# Patient Record
Sex: MALE | Race: WHITE | NOT HISPANIC OR LATINO | Employment: FULL TIME | URBAN - METROPOLITAN AREA
[De-identification: names, ages, dates, MRNs, and addresses within clinical notes are randomized per-mention and may not be internally consistent; named-entity substitution may affect disease eponyms.]

---

## 2017-02-10 ENCOUNTER — GENERIC CONVERSION - ENCOUNTER (OUTPATIENT)
Dept: OTHER | Facility: OTHER | Age: 52
End: 2017-02-10

## 2017-03-13 ENCOUNTER — GENERIC CONVERSION - ENCOUNTER (OUTPATIENT)
Dept: OTHER | Facility: OTHER | Age: 52
End: 2017-03-13

## 2017-03-16 ENCOUNTER — ALLSCRIPTS OFFICE VISIT (OUTPATIENT)
Dept: OTHER | Facility: OTHER | Age: 52
End: 2017-03-16

## 2017-05-30 ENCOUNTER — GENERIC CONVERSION - ENCOUNTER (OUTPATIENT)
Dept: OTHER | Facility: OTHER | Age: 52
End: 2017-05-30

## 2017-05-31 ENCOUNTER — GENERIC CONVERSION - ENCOUNTER (OUTPATIENT)
Dept: OTHER | Facility: OTHER | Age: 52
End: 2017-05-31

## 2017-07-16 LAB
A/G RATIO (HISTORICAL): 1.9 (ref 1.2–2.2)
ALBUMIN SERPL BCP-MCNC: 4.9 G/DL (ref 3.5–5.5)
ALP SERPL-CCNC: 51 IU/L (ref 39–117)
ALT SERPL W P-5'-P-CCNC: 43 IU/L (ref 0–44)
AST SERPL W P-5'-P-CCNC: 24 IU/L (ref 0–40)
BILIRUB SERPL-MCNC: 0.9 MG/DL (ref 0–1.2)
BUN SERPL-MCNC: 16 MG/DL (ref 6–24)
BUN/CREA RATIO (HISTORICAL): 16 (ref 9–20)
CALCIUM SERPL-MCNC: 9.8 MG/DL (ref 8.7–10.2)
CHLORIDE SERPL-SCNC: 97 MMOL/L (ref 96–106)
CHOLEST SERPL-MCNC: 157 MG/DL (ref 100–199)
CO2 SERPL-SCNC: 24 MMOL/L (ref 18–29)
CREAT SERPL-MCNC: 1 MG/DL (ref 0.76–1.27)
CREATININE, URINE (HISTORICAL): 92.6 MG/DL
EGFR AFRICAN AMERICAN (HISTORICAL): 100 ML/MIN/1.73
EGFR-AMERICAN CALC (HISTORICAL): 86 ML/MIN/1.73
GLUCOSE SERPL-MCNC: 114 MG/DL (ref 65–99)
HBA1C MFR BLD HPLC: 6.6 % (ref 4.8–5.6)
HDLC SERPL-MCNC: 34 MG/DL
LDLC SERPL CALC-MCNC: 96 MG/DL (ref 0–99)
MICROALBUM.,U,RANDOM (HISTORICAL): 3.5 UG/ML
MICROALBUMIN/CREATININE RATIO (HISTORICAL): 3.8 MG/G CREAT (ref 0–30)
POTASSIUM SERPL-SCNC: 4.4 MMOL/L (ref 3.5–5.2)
PROSTATE SPECIFIC ANTIGEN (HISTORICAL): 0.3 NG/ML (ref 0–4)
SODIUM SERPL-SCNC: 138 MMOL/L (ref 134–144)
TOT. GLOBULIN, SERUM (HISTORICAL): 2.6 G/DL (ref 1.5–4.5)
TOTAL PROTEIN (HISTORICAL): 7.5 G/DL (ref 6–8.5)
TRIGL SERPL-MCNC: 136 MG/DL (ref 0–149)

## 2017-07-17 ENCOUNTER — GENERIC CONVERSION - ENCOUNTER (OUTPATIENT)
Dept: OTHER | Facility: OTHER | Age: 52
End: 2017-07-17

## 2017-11-27 ENCOUNTER — GENERIC CONVERSION - ENCOUNTER (OUTPATIENT)
Dept: OTHER | Facility: OTHER | Age: 52
End: 2017-11-27

## 2017-12-03 LAB
BUN SERPL-MCNC: 19 MG/DL (ref 6–24)
BUN/CREA RATIO (HISTORICAL): 19 (ref 9–20)
CALCIUM SERPL-MCNC: 10.1 MG/DL (ref 8.7–10.2)
CHLORIDE SERPL-SCNC: 96 MMOL/L (ref 96–106)
CO2 SERPL-SCNC: 27 MMOL/L (ref 18–29)
CREAT SERPL-MCNC: 0.99 MG/DL (ref 0.76–1.27)
EGFR AFRICAN AMERICAN (HISTORICAL): 100 ML/MIN/1.73
EGFR-AMERICAN CALC (HISTORICAL): 87 ML/MIN/1.73
GLUCOSE SERPL-MCNC: 126 MG/DL (ref 65–99)
HBA1C MFR BLD HPLC: 6.9 % (ref 4.8–5.6)
POTASSIUM SERPL-SCNC: 4.8 MMOL/L (ref 3.5–5.2)
SODIUM SERPL-SCNC: 139 MMOL/L (ref 134–144)

## 2017-12-04 ENCOUNTER — GENERIC CONVERSION - ENCOUNTER (OUTPATIENT)
Dept: OTHER | Facility: OTHER | Age: 52
End: 2017-12-04

## 2017-12-11 ENCOUNTER — ALLSCRIPTS OFFICE VISIT (OUTPATIENT)
Dept: OTHER | Facility: OTHER | Age: 52
End: 2017-12-11

## 2018-01-09 NOTE — RESULT NOTES
Verified Results  [facility]) BMP8+eGFR [id number] 08:14AM [facility]     Test Name Result Flag Reference   Glucose, Serum 149 mg/dL H 65-99   BUN 19 mg/dL  6-24   Creatinine, Serum 1 03 mg/dL  0 76-1 27   eGFR If NonAfricn Am 83 mL/min/1 73  >59   eGFR If Africn Am 96 mL/min/1 73  >59   BUN/Creatinine Ratio 18  9-20   Sodium, Serum 141 mmol/L  136-144   Potassium, Serum 4 7 mmol/L  3 5-5 2   Chloride, Serum 97 mmol/L     Carbon Dioxide, Total 26 mmol/L  18-29   Calcium, Serum 10 1 mg/dL  8 7-10 2     (1) HEMOGLOBIN A1C [id number] 08:14AM [place]     Test Name Result Flag Reference   Hemoglobin A1c 8 0 % H 4 8-5 6   Pre-diabetes: 5 7 - 6 4           Diabetes: >6 4           Glycemic control for adults with diabetes: <7 0

## 2018-01-11 NOTE — RESULT NOTES
Verified Results  (1) COMPREHENSIVE METABOLIC PANEL [id number] 69:30CX [address] [facility]     Test Name Result Flag Reference   Glucose, Serum 114 mg/dL H 65-99   BUN 16 mg/dL  6-24   Creatinine, Serum 1 00 mg/dL  0 76-1 27   BUN/Creatinine Ratio 16  9-20   Sodium, Serum 138 mmol/L  134-144   Potassium, Serum 4 4 mmol/L  3 5-5 2   Chloride, Serum 97 mmol/L     Carbon Dioxide, Total 24 mmol/L  18-29   Calcium, Serum 9 8 mg/dL  8 7-10 2   Protein, Total, Serum 7 5 g/dL  6 0-8 5   Albumin, Serum 4 9 g/dL  3 5-5 5   Globulin, Total 2 6 g/dL  1 5-4 5   A/G Ratio 1 9  1 2-2 2   Bilirubin, Total 0 9 mg/dL  0 0-1 2   Alkaline Phosphatase, S 51 IU/L     AST (SGOT) 24 IU/L  0-40   ALT (SGPT) 43 IU/L  0-44   eGFR If NonAfricn Am 86 mL/min/1 73  >59   eGFR If Africn Am 100 mL/min/1 73  >59     (1) HEMOGLOBIN A1C [id number] 07:30AM [address] [facility]     Test Name Result Flag Reference   Hemoglobin A1c 6 6 % H 4 8-5 6   Pre-diabetes: 5 7 - 6 4           Diabetes: >6 4           Glycemic control for adults with diabetes: <7 0     (1) LIPID PANEL FASTING W DIRECT LDL REFLEX [id number] 07:30AM [address] [facility]     Test Name Result Flag Reference   Cholesterol, Total 157 mg/dL  100-199   Triglycerides 136 mg/dL  0-149   HDL Cholesterol 34 mg/dL L >39   LDL Cholesterol Calc 96 mg/dL  0-99     (1) MICROALBUMIN CREATININE RATIO, RANDOM URINE [id number] 07:30AM [address] [facility]     Test Name Result Flag Reference   Creatinine, Urine 92 6 mg/dL  Not Estab  Microalbumin, Urine 3 5 ug/mL  Not Estab  Microalb/Creat Ratio 3 8 mg/g creat  0 0-30 0     (1) PSA (SCREEN) (Dx V76 44 Screen for Prostate Cancer) [id number] 07:30AM [address] [facility]     Test Name Result Flag Reference   Prostate Specific Ag, Serum 0 3 ng/mL  0 0-4 0   Roche ECLIA methodology  According to the American Urological Association, Serum PSA should  decrease and remain at undetectable levels after radical  prostatectomy   The AUA defines biochemical recurrence as an initial  PSA value 0 2 ng/mL or greater followed by a subsequent confirmatory  PSA value 0 2 ng/mL or greater  Values obtained with different assay methods or kits cannot be used  interchangeably  Results cannot be interpreted as absolute evidence  of the presence or absence of malignant disease

## 2018-01-12 NOTE — RESULT NOTES
Verified Results  (1) COMPREHENSIVE METABOLIC PANEL [id number] 17:83OI [employer]     Test Name Result Flag Reference   Glucose, Serum 124 mg/dL H 65-99   BUN 19 mg/dL  6-24   Creatinine, Serum 0 93 mg/dL  0 76-1 27   eGFR If NonAfricn Am 95 mL/min/1 73  >59   eGFR If Africn Am 109 mL/min/1 73  >59   BUN/Creatinine Ratio 20  9-20   Sodium, Serum 138 mmol/L  134-144   Potassium, Serum 4 5 mmol/L  3 5-5 2   Chloride, Serum 96 mmol/L L    Carbon Dioxide, Total 24 mmol/L  18-29   Calcium, Serum 9 7 mg/dL  8 7-10 2   Protein, Total, Serum 7 6 g/dL  6 0-8 5   Albumin, Serum 4 7 g/dL  3 5-5 5   Globulin, Total 2 9 g/dL  1 5-4 5   A/G Ratio 1 6  1 1-2 5   Bilirubin, Total 0 7 mg/dL  0 0-1 2   Alkaline Phosphatase, S 54 IU/L     AST (SGOT) 32 IU/L  0-40   ALT (SGPT) 61 IU/L H 0-44     (1) LIPID PANEL, FASTING [id number] 08:18AM [employer]     Test Name Result Flag Reference   Cholesterol, Total 166 mg/dL  100-199   Triglycerides 159 mg/dL H 0-149   HDL Cholesterol 34 mg/dL L >39   According to ATP-III Guidelines, HDL-C >59 mg/dL is considered a  negative risk factor for CHD  VLDL Cholesterol Greg 32 mg/dL  5-40   LDL Cholesterol Calc 100 mg/dL H 0-99   T  Chol/HDL Ratio 4 9 ratio units  0 0-5 0   T  Chol/HDL Ratio                                                             Men  Women                                               1/2 Avg  Risk  3 4    3 3                                                   Avg Risk  5 0    4 4                                                2X Avg  Risk  9 6    7 1                                                3X Avg  Risk 23 4   11 0     (1) PSA (SCREEN) (Dx V76 44 Screen for Prostate Cancer) [id number] 08:18AM [employer]     Test Name Result Flag Reference   Prostate Specific Ag, Serum 0 4 ng/mL  0 0-4 0   Roche ECLIA methodology  According to the American Urological Association, Serum PSA should  decrease and remain at undetectable levels after radical  prostatectomy   The A defines biochemical recurrence as an initial  PSA value 0 2 ng/mL or greater followed by a subsequent confirmatory  PSA value 0 2 ng/mL or greater  Values obtained with different assay methods or kits cannot be used  interchangeably  Results cannot be interpreted as absolute evidence  of the presence or absence of malignant disease  (1) MICROALBUMIN CREATININE RATIO, RANDOM URINE [id number] 08:18AM C[facility]     Test Name Result Flag Reference   Creatinine, Urine 21 0 mg/dL  Not Estab  Microalbumin, Urine <3 0 ug/mL  Not Estab     Microalb/Creat Ratio <14 3 mg/g creat  0 0-30 0

## 2018-01-12 NOTE — RESULT NOTES
Message   Recorded as Task   Date: 11/18/2016 01:59 PM, Created By: Ovi Ribeiro   Task Name: Call Patient with results   Assigned To: Pasquale Roy   Regarding Patient: Jose Shabazz, Status: Active   CommentAiddejuan Taylor - 18 Nov 2016 1:59 PM     Patient Phone: [phone]    sugar and A1C is much higher- needs appt tp review- 1-2 weeks is OK- this is diabetic range and he should be on meds- last A1C had been < 7%   Brooke Bruce - 22 Nov 2016 10:38 AM     TASK EDITED  PT aware- OV scheduled          Signatures   Electronically signed by : Deonna Perez, ; Nov 22 2016 10:39AM EST                       (Author)

## 2018-01-13 VITALS
HEART RATE: 96 BPM | DIASTOLIC BLOOD PRESSURE: 90 MMHG | OXYGEN SATURATION: 99 % | BODY MASS INDEX: 43.47 KG/M2 | RESPIRATION RATE: 16 BRPM | HEIGHT: 67 IN | WEIGHT: 277 LBS | SYSTOLIC BLOOD PRESSURE: 140 MMHG

## 2018-01-14 NOTE — RESULT NOTES
Verified Results  (1) COMPREHENSIVE METABOLIC PANEL [id number] 82:93GA [facility]     Test Name Result Flag Reference   Glucose, Serum 128 mg/dL H 65-99   BUN 18 mg/dL  6-24   Creatinine, Serum 1 02 mg/dL  0 76-1 27   eGFR If NonAfricn Am 85 mL/min/1 73  >59   eGFR If Africn Am 98 mL/min/1 73  >59   BUN/Creatinine Ratio 18  9-20   Sodium, Serum 139 mmol/L  134-144   Potassium, Serum 4 6 mmol/L  3 5-5 2   Chloride, Serum 96 mmol/L L    Carbon Dioxide, Total 26 mmol/L  18-29   Calcium, Serum 10 1 mg/dL  8 7-10 2   Protein, Total, Serum 7 7 g/dL  6 0-8 5   Albumin, Serum 4 9 g/dL  3 5-5 5   Globulin, Total 2 8 g/dL  1 5-4 5   A/G Ratio 1 8  1 1-2 5   Bilirubin, Total 0 8 mg/dL  0 0-1 2   Alkaline Phosphatase, S 53 IU/L     AST (SGOT) 28 IU/L  0-40   ALT (SGPT) 49 IU/L H 0-44     (LC) Lipid Panel [id number] 08:31AM [facility]     Test Name Result Flag Reference   Cholesterol, Total 217 mg/dL H 100-199   Triglycerides 233 mg/dL H 0-149   HDL Cholesterol 30 mg/dL L >39   According to ATP-III Guidelines, HDL-C >59 mg/dL is considered a  negative risk factor for CHD     VLDL Cholesterol Greg 47 mg/dL H 5-40   LDL Cholesterol Calc 140 mg/dL H 0-99

## 2018-01-15 NOTE — RESULT NOTES
Verified Results  ([address] [facility]) Lyme Ab/Western Blot Reflex [id number] 11:08AM [facility]     Test Name Result Flag Reference   Lyme IgG/IgM Ab <0 91 ISR  0 00-0 90   Negative         <0 91                                                 Equivocal  0 91 - 1 09                                                 Positive         >1 09   Lyme Disease Ab, Quant, IgM <0 80 index  0 00-0 79   Negative         <0 80                                                 Equivocal  0 80 - 1 19                                                 Positive         >1 19                  IgM levels may peak at 3-6 weeks post infection, then                  gradually decline

## 2018-01-15 NOTE — RESULT NOTES
Verified Results  ([address] [facility]) Hemoglobin A1c [id number] 08:31AM [facility] Weiss     Test Name Result Flag Reference   Hemoglobin A1c 6 7 %Hb H    Reference Range:  American Diabetes Association (ADA) Guidelines:  <5 7: Decreased risk for diabetes  5 7 - 6 4: Increased risk for diabetes  >6 4: Ongoing Hyperglycemia of any cause  <7 0: Glycemic control for adults with diabetes   Estimated Average Glucose 146 mg/dL

## 2018-01-16 NOTE — RESULT NOTES
Verified Results  (1) BASIC METABOLIC PROFILE [id number] 07:59AM [email or web address]     Test Name Result Flag Reference   Glucose, Serum 143 mg/dL H 65-99   BUN 19 mg/dL  6-24   Creatinine, Serum 0 98 mg/dL  0 76-1 27   eGFR If NonAfricn Am 88 mL/min/1 73  >59   eGFR If Africn Am 102 mL/min/1 73  >59   BUN/Creatinine Ratio 19  9-20   Sodium, Serum 140 mmol/L  134-144   Potassium, Serum 4 9 mmol/L  3 5-5 2   Chloride, Serum 97 mmol/L     Carbon Dioxide, Total 25 mmol/L  18-29   Calcium, Serum 9 8 mg/dL  8 7-10 2     (1) HEMOGLOBIN A1C [id number] 07:59AM [email or web address]     Test Name Result Flag Reference   Hemoglobin A1c 8 0 % H 4 8-5 6   Pre-diabetes: 5 7 - 6 4           Diabetes: >6 4           Glycemic control for adults with diabetes: <7 0

## 2018-01-23 VITALS
OXYGEN SATURATION: 98 % | RESPIRATION RATE: 16 BRPM | HEART RATE: 100 BPM | DIASTOLIC BLOOD PRESSURE: 90 MMHG | HEIGHT: 67 IN | WEIGHT: 266 LBS | SYSTOLIC BLOOD PRESSURE: 140 MMHG | BODY MASS INDEX: 41.75 KG/M2

## 2018-01-23 NOTE — RESULT NOTES
Verified Results  (1) HEMOGLOBIN A1C [id number] 07:31AM [employer]     Test Name Result Flag Reference   Hemoglobin A1c 6 9 % H 4 8-5 6   Pre-diabetes: 5 7 - 6 4           Diabetes: >6 4           Glycemic control for adults with diabetes: <7 0     (1) BASIC METABOLIC PROFILE [id number] 07:31AM [employer]     Test Name Result Flag Reference   Glucose, Serum 126 mg/dL H 65-99   BUN 19 mg/dL  6-24   Creatinine, Serum 0 99 mg/dL  0 76-1 27   BUN/Creatinine Ratio 19  9-20   Sodium, Serum 139 mmol/L  134-144   Potassium, Serum 4 8 mmol/L  3 5-5 2   Chloride, Serum 96 mmol/L     Carbon Dioxide, Total 27 mmol/L  18-29   Calcium, Serum 10 1 mg/dL  8 7-10 2   eGFR If NonAfricn Am 87 mL/min/1 73  >59   eGFR If Africn Am 100 mL/min/1 73  >59

## 2018-01-23 NOTE — PROGRESS NOTES
Assessment    1  Encounter for preventive health examination (V70 0) (Z00 00)   2  Encounter for screening colonoscopy (V76 51) (Z12 11)   3  Need for vaccination (V05 9) (Z23)   4  Encounter for HCV screening test for low risk patient (V73 89) (Z11 59)    Plan  Diabetes mellitus    · MetFORMIN HCl - 850 MG Oral Tablet; Take 1 tablet twice daily   · *VB - Foot Exam ; every 1 year; Last 13GZK8823; Next 74PLY0813; Status:Active  Encounter for HCV screening test for low risk patient, Hyperlipidemia associated with  type 2 diabetes mellitus, Hypertension, Screening for prostate cancer    · (1) COMPREHENSIVE METABOLIC PANEL; Status:Active; Requested for:88Exr4286;    · (1) HEMOGLOBIN A1C; Status:Active; Requested for:97Rmi1571;    · (1) HEP C ANTIBODY; Status:Active; Requested for:36Tsf6942;    · (1) LIPID PANEL FASTING W DIRECT LDL REFLEX; Status:Active; Requested  for:94Pzp6857;    · (1) MICROALBUMIN CREATININE RATIO, RANDOM URINE; Status:Active; Requested  for:38Riy1034;    · (1) PSA (SCREEN) (Dx V76 44 Screen for Prostate Cancer); Status:Active; Requested  for:18Quc7458;   Encounter for screening colonoscopy    · COLONOSCOPY; Status:Active; Requested for:18Ikm1499;   Hyperlipidemia associated with type 2 diabetes mellitus    · Atorvastatin Calcium 10 MG Oral Tablet; TAKE 1 TABLET DAILY AS DIRECTED  Need for vaccination    · Fluzone Quadrivalent 0 5 ML Intramuscular Suspension Prefilled Syringe  Peptic reflux esophagitis    · *VB - Foot Exam; Status:Complete;   Done: 56LLB2119 12:00AM    Discussion/Summary  health maintenance visit eats an adequate diet Currently, he has an adequate exercise regimen  Prostate cancer screening: prostate cancer screening is current  Testicular cancer screening: testicular cancer screening is not indicated  Colorectal cancer screening: colonoscopy has been ordered  Screening lab work includes completed  The immunizations are up to date   He was advised to be evaluated by an optometrist and a dentist  Advice and education were given regarding nutrition, seat belt use, weight loss and advanced directive planning  Patient discussion: discussed with the patient  The patient was counseled on Hepatitis C screening  The patient agrees to Hepatitis C screening  5 Wishes forms provided  Hep C screen with next labs PSA 7/2018  Colonoscopy order  The treatment plan was reviewed with the patient/guardian  The patient/guardian understands and agrees with the treatment plan      Chief Complaint  annual     Advance Directives  Advance Directive [facility]:   NO - Patient does not have an advance health care directive  History of Present Illness  , Adult Male: The patient is being seen for a health maintenance evaluation  Social History: He is   Work status: working full time  The patient is a former cigarette smoker and quit smoking 11/13/1994  He reports rare alcohol use  General Health: The patient's health since the last visit is described as good  He has regular dental visits  He denies vision problems  He denies hearing loss  Lifestyle:  He does not have a healthy diet  He has weight concerns  He does not exercise regularly  He does not use tobacco  The patient is a former cigarette smoker and quit smoking cigarettes: 11/13/1994  He consumes alcohol  He reports occasional alcohol use  Alcohol concern: no personal concern about alcohol use  He denies drug use  Screening: Active Problems    1  Diabetes mellitus (250 00) (E11 9)   2  Encounter for screening colonoscopy (V76 51) (Z12 11)   3  Hyperlipidemia associated with type 2 diabetes mellitus (209 89,782  4) (E11 69,E78 5)   4  Hypertension (401 9) (I10)   5  Peptic reflux esophagitis (530 11) (K21 0)   6   Screening for prostate cancer (V76 44) (Z12 5)    Past Medical History    · History of Encounter for screening fecal occult blood testing (V76 51) (Z12 11)   · History of Need for Tdap vaccination (V06 1) (Z23)   · History of Tick bite of back (911 4,E906 4) (T41 983A,R33  Shani Raymond)    Family History  Father    · Family history of diabetes mellitus (V18 0) (Z83 3)   · Family history of hyperlipidemia (V18 19) (Z83 49)  Sister    · Family history of Bipolar 1 disorder   · Family history of suicide (V17 0) (Z81 8)  Family History    · Denied: Family history of substance abuse    Social History    · Alcohol use (V49 89) (Z78 9)   · Former smoker (V15 82) (G20 570)   · Full-time employment   · Lives alone without help available (V60 4) (Z60 2)    Current Meds   1  Atorvastatin Calcium 10 MG Oral Tablet; TAKE 1 TABLET DAILY AS DIRECTED; Therapy: 14ODD7776 to (Juan David Ayon)  Requested for: 10Yyr5429; Last   Rx:84Utz5195 Ordered   2  HydroCHLOROthiazide 25 MG Oral Tablet; take 1 tablet by mouth daily; Therapy: 68TQG8297 to (Evaluate:18Feb2018)  Requested for: 80ZWP0621; Last   Rx:20Nov2017 Ordered   3  MetFORMIN HCl - 850 MG Oral Tablet; Take 1 tablet twice daily; Therapy: 94TWU2534 to (Yuridia Anis)  Requested for: 18DNG8467; Last   Rx:87Bgj9839 Ordered   4  Omeprazole 20 MG Oral Capsule Delayed Release; take 1 capsule daily; Therapy: 12IZF4480 to ([id number])  Requested for: 58MME3214; Last   Rx:07Nov2017 Ordered   5  Osteo Bi-Flex Adv Triple St Oral Tablet; Take 1 tablet twice daily; Therapy: 34EOB0408 to (Last Rx:14Mar2016) Ordered   6  Quinapril HCl - 40 MG Oral Tablet; take 1 tablet by mouth daily; Therapy: 56JLF9238 to (Evaluate:56Ryq6204)  Requested for: 39MHK4172; Last   Rx:27Nov2017 Ordered    Allergies    1  No Known Drug Allergies    Vitals   Recorded: 89FDI0482 06:17PM   Heart Rate 100   Respiration 16   Systolic 904   Diastolic 90   Height 5 ft 7 in   Weight 266 lb    BMI Calculated 41 66   BSA Calculated 2 28   O2 Saturation 98     Physical Exam    Constitutional   General appearance: No acute distress, well appearing and well nourished      Head and Face   Head and face: Normal     Palpation of the face and sinuses: No sinus tenderness  Eyes   Pupils and irises: Equal, round, reactive to light  Ears, Nose, Mouth, and Throat   External inspection of ears and nose: Normal     Nasal mucosa, septum, and turbinates: Normal without edema or erythema  Oropharynx: Normal with no erythema, edema, exudate or lesions  Neck   Neck: Supple, symmetric, trachea midline, no masses  Thyroid: Normal, no thyromegaly  Pulmonary   Respiratory effort: No increased work of breathing or signs of respiratory distress  Auscultation of lungs: Clear to auscultation  Cardiovascular   Auscultation of heart: Normal rate and rhythm, normal S1 and S2, no murmurs  Carotid pulses: 2+ bilaterally  Pedal pulses: 2+ bilaterally  Abdomen   Abdomen: Non-tender, no masses  Liver and spleen: No hepatomegaly or splenomegaly  Psychiatric   Orientation to person, place and time: Normal        Results/Data  PHQ-2 Adult Depression Screening 82Htw9396 07:05PM [facility]     Test Name Result Flag Reference   PHQ-2 Adult Depression Score 0     Over the last two weeks, how often have you been bothered by any of the following problems? Little interest or pleasure in doing things: Not at all - 0  Feeling down, depressed, or hopeless: Not at all - 0   PHQ-2 Adult Depression Screening Negative       *VB - Foot Exam 54WCK7198 12:00AM [facility]     Test Name Result Flag Reference   FOOT EXAM 51PZR7677         Health Management  Diabetes mellitus   *VB - Eye Exam; every 1 year; Last 87RFP2306; Next Due: 86GKI5800; Active  *VB - Foot Exam; every 1 year; Last 58EBS9622; Next Due: 09Viw0787; Active  Encounter for screening colonoscopy   COLONOSCOPY; every 10 years; Next Due: 71CVY2724; Overdue  History of Encounter for screening fecal occult blood testing   (1) OCCULT BLOOD, FECAL IMMUNOCHEMICAL TEST; every 1 year; Last 51QLB2914;  Next Due: 72HHT0543;  Overdue    Signatures   Electronically signed by : Cassandra Nicole MD; Dec 11 2017  7:08PM EST                       (Author)

## 2018-02-03 DIAGNOSIS — K21.00 PEPTIC REFLUX ESOPHAGITIS: Primary | ICD-10-CM

## 2018-02-05 RX ORDER — OMEPRAZOLE 20 MG/1
CAPSULE, DELAYED RELEASE ORAL
Qty: 90 CAPSULE | Refills: 0 | Status: SHIPPED | OUTPATIENT
Start: 2018-02-05 | End: 2018-05-05 | Stop reason: SDUPTHER

## 2018-02-20 DIAGNOSIS — I10 ESSENTIAL HYPERTENSION: Primary | ICD-10-CM

## 2018-02-20 RX ORDER — HYDROCHLOROTHIAZIDE 25 MG/1
TABLET ORAL
Qty: 90 TABLET | Refills: 1 | Status: SHIPPED | OUTPATIENT
Start: 2018-02-20 | End: 2018-08-21 | Stop reason: SDUPTHER

## 2018-02-26 DIAGNOSIS — I10 HYPERTENSION, UNSPECIFIED TYPE: Primary | ICD-10-CM

## 2018-02-28 RX ORDER — QUINAPRIL 40 MG/1
TABLET ORAL
Qty: 90 TABLET | Refills: 1 | Status: SHIPPED | OUTPATIENT
Start: 2018-02-28 | End: 2018-08-21 | Stop reason: SDUPTHER

## 2018-05-05 DIAGNOSIS — K21.00 PEPTIC REFLUX ESOPHAGITIS: ICD-10-CM

## 2018-05-05 RX ORDER — OMEPRAZOLE 20 MG/1
CAPSULE, DELAYED RELEASE ORAL
Qty: 90 CAPSULE | Refills: 0 | Status: SHIPPED | OUTPATIENT
Start: 2018-05-05 | End: 2018-08-22 | Stop reason: SDUPTHER

## 2018-05-16 DIAGNOSIS — E78.5 HYPERLIPIDEMIA ASSOCIATED WITH TYPE 2 DIABETES MELLITUS (HCC): Primary | ICD-10-CM

## 2018-05-16 DIAGNOSIS — E11.69 HYPERLIPIDEMIA ASSOCIATED WITH TYPE 2 DIABETES MELLITUS (HCC): Primary | ICD-10-CM

## 2018-05-16 RX ORDER — ATORVASTATIN CALCIUM 10 MG/1
TABLET, FILM COATED ORAL
Qty: 90 TABLET | Refills: 0 | Status: SHIPPED | OUTPATIENT
Start: 2018-05-16 | End: 2018-07-22 | Stop reason: SDUPTHER

## 2018-05-26 DIAGNOSIS — E11.9 TYPE 2 DIABETES MELLITUS WITHOUT COMPLICATION, WITHOUT LONG-TERM CURRENT USE OF INSULIN (HCC): Primary | ICD-10-CM

## 2018-06-07 DIAGNOSIS — E11.9 TYPE 2 DIABETES MELLITUS WITHOUT COMPLICATION, UNSPECIFIED WHETHER LONG TERM INSULIN USE (HCC): ICD-10-CM

## 2018-06-07 DIAGNOSIS — E78.5 HYPERLIPIDEMIA ASSOCIATED WITH TYPE 2 DIABETES MELLITUS (HCC): Primary | ICD-10-CM

## 2018-06-07 DIAGNOSIS — E11.69 HYPERLIPIDEMIA ASSOCIATED WITH TYPE 2 DIABETES MELLITUS (HCC): Primary | ICD-10-CM

## 2018-06-19 LAB
ALBUMIN SERPL-MCNC: 4.8 G/DL (ref 3.5–5.5)
ALBUMIN/CREAT UR: 8.4 MG/G CREAT (ref 0–30)
ALBUMIN/GLOB SERPL: 1.8 {RATIO} (ref 1.2–2.2)
ALP SERPL-CCNC: 52 IU/L (ref 39–117)
ALT SERPL-CCNC: 58 IU/L (ref 0–44)
AST SERPL-CCNC: 29 IU/L (ref 0–40)
BILIRUB SERPL-MCNC: 0.6 MG/DL (ref 0–1.2)
BUN SERPL-MCNC: 21 MG/DL (ref 6–24)
BUN/CREAT SERPL: 19 (ref 9–20)
CALCIUM SERPL-MCNC: 9.9 MG/DL (ref 8.7–10.2)
CHLORIDE SERPL-SCNC: 97 MMOL/L (ref 96–106)
CHOLEST SERPL-MCNC: 149 MG/DL (ref 100–199)
CO2 SERPL-SCNC: 24 MMOL/L (ref 20–29)
CREAT SERPL-MCNC: 1.13 MG/DL (ref 0.76–1.27)
CREAT UR-MCNC: 125.5 MG/DL
GLOBULIN SER-MCNC: 2.6 G/DL (ref 1.5–4.5)
GLUCOSE SERPL-MCNC: 144 MG/DL (ref 65–99)
HBA1C MFR BLD: 7.3 % (ref 4.8–5.6)
HCV AB S/CO SERPL IA: <0.1 S/CO RATIO (ref 0–0.9)
HDLC SERPL-MCNC: 33 MG/DL
LDLC SERPL CALC-MCNC: 86 MG/DL (ref 0–99)
MICROALBUMIN UR-MCNC: 10.5 UG/ML
POTASSIUM SERPL-SCNC: 4.7 MMOL/L (ref 3.5–5.2)
PROT SERPL-MCNC: 7.4 G/DL (ref 6–8.5)
PSA SERPL-MCNC: 0.3 NG/ML (ref 0–4)
SL AMB EGFR AFRICAN AMERICAN: 85 ML/MIN/1.73
SL AMB EGFR NON AFRICAN AMERICAN: 74 ML/MIN/1.73
SODIUM SERPL-SCNC: 139 MMOL/L (ref 134–144)
TRIGL SERPL-MCNC: 152 MG/DL (ref 0–149)

## 2018-06-21 ENCOUNTER — TELEPHONE (OUTPATIENT)
Dept: FAMILY MEDICINE CLINIC | Facility: CLINIC | Age: 53
End: 2018-06-21

## 2018-06-21 NOTE — TELEPHONE ENCOUNTER
Patient notified as per Dr Hermes Lr      ----- Message from Lelia Del Cid MD sent at 6/21/2018  1:14 PM EDT -----  Results reviewed-will discuss at scheduled appointment-no changes needed now    APPOINTMENT 7/2/18 @ 7040 ([phone])

## 2018-06-26 DIAGNOSIS — E11.9 TYPE 2 DIABETES MELLITUS WITHOUT COMPLICATION, WITHOUT LONG-TERM CURRENT USE OF INSULIN (HCC): ICD-10-CM

## 2018-07-02 ENCOUNTER — OFFICE VISIT (OUTPATIENT)
Dept: FAMILY MEDICINE CLINIC | Facility: CLINIC | Age: 53
End: 2018-07-02
Payer: COMMERCIAL

## 2018-07-02 VITALS
RESPIRATION RATE: 16 BRPM | BODY MASS INDEX: 43.32 KG/M2 | DIASTOLIC BLOOD PRESSURE: 80 MMHG | HEIGHT: 67 IN | OXYGEN SATURATION: 98 % | WEIGHT: 276 LBS | SYSTOLIC BLOOD PRESSURE: 140 MMHG | HEART RATE: 74 BPM

## 2018-07-02 DIAGNOSIS — E78.5 HYPERLIPIDEMIA ASSOCIATED WITH TYPE 2 DIABETES MELLITUS (HCC): Primary | ICD-10-CM

## 2018-07-02 DIAGNOSIS — E66.01 MORBID OBESITY (HCC): ICD-10-CM

## 2018-07-02 DIAGNOSIS — K21.00 PEPTIC REFLUX ESOPHAGITIS: ICD-10-CM

## 2018-07-02 DIAGNOSIS — I10 BENIGN ESSENTIAL HYPERTENSION: ICD-10-CM

## 2018-07-02 DIAGNOSIS — Z23 NEED FOR 23-POLYVALENT PNEUMOCOCCAL POLYSACCHARIDE VACCINE: ICD-10-CM

## 2018-07-02 DIAGNOSIS — E11.69 HYPERLIPIDEMIA ASSOCIATED WITH TYPE 2 DIABETES MELLITUS (HCC): Primary | ICD-10-CM

## 2018-07-02 DIAGNOSIS — E11.9 TYPE 2 DIABETES MELLITUS WITHOUT COMPLICATION, WITHOUT LONG-TERM CURRENT USE OF INSULIN (HCC): ICD-10-CM

## 2018-07-02 PROCEDURE — 90732 PPSV23 VACC 2 YRS+ SUBQ/IM: CPT

## 2018-07-02 PROCEDURE — 1036F TOBACCO NON-USER: CPT | Performed by: FAMILY MEDICINE

## 2018-07-02 PROCEDURE — 90471 IMMUNIZATION ADMIN: CPT

## 2018-07-02 PROCEDURE — 3008F BODY MASS INDEX DOCD: CPT | Performed by: FAMILY MEDICINE

## 2018-07-02 PROCEDURE — 99214 OFFICE O/P EST MOD 30 MIN: CPT | Performed by: FAMILY MEDICINE

## 2018-07-02 RX ORDER — GLUCOSAM/CHON-MSM1/C/MANG/BOSW 750-644 MG
1 TABLET ORAL DAILY
COMMUNITY
Start: 2016-03-14

## 2018-07-02 NOTE — PATIENT INSTRUCTIONS
Continue current medications  Call when prescriptions are needed  Repeat labs 6 months  Improve her diet, increase her exercise, and decreased weight  You're still do colonoscopy  [facility] [phone]  Pneumovax today  Consider getting the shingles vaccine at the pharmacy, but check with her insurance to be sure coverage before they administer it  Labs will be due in 6 month-December 2018-BMP and A1c  Discussed trial of pepcid OTC 40mg in AM, then attempt to taper

## 2018-07-02 NOTE — PROGRESS NOTES
[address]         NAME: Kailyn Starr is a 47 y o  male  : 1965    MRN: [id number]  DATE: 2018  TIME: 7:03 PM    Assessment and Plan   Hyperlipidemia associated with type 2 diabetes mellitus ([facility] ) [E11 69, E78 5]  1  Hyperlipidemia associated with type 2 diabetes mellitus ([facility] )     2  Benign essential hypertension     3  Peptic reflux esophagitis     4  Type 2 diabetes mellitus without complication, without long-term current use of insulin ([facility] )     5  Morbid obesity ([facility] )     6  Need for 23-polyvalent pneumococcal polysaccharide vaccine  PNEUMOCOCCAL POLYSACCHARIDE VACCINE 23-VALENT =>3YO SQ IM         Patient Instructions     Patient Instructions   Continue current medications  Call when prescriptions are needed  Repeat labs 6 months  Improve her diet, increase her exercise, and decreased weight  You're still do colonoscopy  [facility] -[phone]  Pneumovax today  Consider getting the shingles vaccine at the pharmacy, but check with her insurance to be sure coverage before they administer it  Labs will be due in 6 month-2018-BMP and A1c  Discussed trial of pepcid OTC 40mg in AM, then attempt to taper  Chief Complaint     Chief Complaint   Patient presents with    Follow-up     mm - revew labs, med refill    History of Present Illness       Medical Management-  1) Diabetes- A1C is at 7 3%- compliant with meds  Aware of changes to be made  2) HTN-does monitor- usually closer  to 130/80 or below  3) Hyperlipidemia- low HDL o/w better results  No side effects from meds  4) GERD- stable on Prilosec- willing to attempt switch to Pepcid  5) Obesity- still an issue        Review of Systems   Review of Systems   Constitutional: Negative for activity change, appetite change, diaphoresis and fatigue  Respiratory: Negative for cough, chest tightness, shortness of breath and wheezing      Cardiovascular: Negative for chest pain, palpitations and leg swelling  Fast or slow heart rate   Gastrointestinal: Negative for abdominal pain, blood in stool, constipation, diarrhea, nausea and vomiting  Genitourinary: Negative for difficulty urinating, dysuria and frequency  Musculoskeletal: Negative for arthralgias, gait problem, joint swelling and myalgias  Neurological: Negative for dizziness, light-headedness, numbness and headaches  Psychiatric/Behavioral: Negative for agitation, confusion, dysphoric mood and sleep disturbance  The patient is not nervous/anxious  Current Medications       Current Outpatient Prescriptions:     Misc Natural Products (OSTEO BI-FLEX ADV TRIPLE ST) TABS, Take 1 tablet by mouth 2 (two) times a day, Disp: , Rfl:     atorvastatin (LIPITOR) 10 mg tablet, TAKE 1 TABLET DAILY AS INSTRUCTED, Disp: 90 tablet, Rfl: 0    hydrochlorothiazide (HYDRODIURIL) 25 mg tablet, TAKE 1 TABLET BY MOUTH DAILY, Disp: 90 tablet, Rfl: 1    metFORMIN (GLUCOPHAGE) 850 mg tablet, TAKE 1 TABLET BY MOUTH TWICE A DAY, Disp: 60 tablet, Rfl: 0    omeprazole (PriLOSEC) 20 mg delayed release capsule, TAKE 1 CAPSULE DAILY, Disp: 90 capsule, Rfl: 0    quinapril (ACCUPRIL) 40 MG tablet, TAKE 1 TABLET BY MOUTH DAILY, Disp: 90 tablet, Rfl: 1    Current Allergies     Allergies as of 07/02/2018    (No Known Allergies)            The following portions of the patient's history were reviewed and updated as appropriate: allergies, current medications, past family history, past medical history, past social history, past surgical history and problem list      No past medical history on file  No past surgical history on file  Family History   Problem Relation Age of Onset    Diabetes Father     Hyperlipidemia Father     Bipolar disorder Sister         1 disorder    Other Sister         hx of suicide    Lung cancer Mother     Substance Abuse Neg Hx          Medications have been verified          Objective   /80 (BP Location: Left arm, Patient Position: Sitting, Cuff Size: Large)   Pulse 74   Resp 16   Ht 5' 7" (1 702 m)   Wt 125 kg (276 lb)   SpO2 98%   BMI 43 23 kg/m²        Physical Exam     Physical Exam   Constitutional: He is oriented to person, place, and time  He appears well-developed and well-nourished  No distress  HENT:   Head: Normocephalic and atraumatic  Right Ear: Tympanic membrane, external ear and ear canal normal    Left Ear: Tympanic membrane, external ear and ear canal normal    Nose: No mucosal edema or rhinorrhea  Right sinus exhibits no maxillary sinus tenderness  Left sinus exhibits no maxillary sinus tenderness  Mouth/Throat: Uvula is midline  Mucous membranes are not pale and not dry  Normal dentition  No oropharyngeal exudate  Eyes: EOM are normal  Pupils are equal, round, and reactive to light  Right eye exhibits no discharge  Left eye exhibits no discharge  Neck: Normal range of motion  Neck supple  No thyromegaly present  Cardiovascular: Normal rate, regular rhythm, normal heart sounds and intact distal pulses  No murmur heard  Pulmonary/Chest: Effort normal and breath sounds normal  No respiratory distress  He has no wheezes  He has no rales  Musculoskeletal: Normal range of motion  He exhibits no edema or tenderness  Lymphadenopathy:     He has no cervical adenopathy  Neurological: He is alert and oriented to person, place, and time  No cranial nerve deficit  Skin: He is not diaphoretic  Psychiatric: He has a normal mood and affect   His behavior is normal

## 2018-07-17 LAB
LEFT EYE DIABETIC RETINOPATHY: NORMAL
RIGHT EYE DIABETIC RETINOPATHY: NORMAL

## 2018-07-17 PROCEDURE — 3072F LOW RISK FOR RETINOPATHY: CPT | Performed by: FAMILY MEDICINE

## 2018-07-22 DIAGNOSIS — E78.5 HYPERLIPIDEMIA ASSOCIATED WITH TYPE 2 DIABETES MELLITUS (HCC): ICD-10-CM

## 2018-07-22 DIAGNOSIS — E11.69 HYPERLIPIDEMIA ASSOCIATED WITH TYPE 2 DIABETES MELLITUS (HCC): ICD-10-CM

## 2018-07-23 DIAGNOSIS — E11.9 TYPE 2 DIABETES MELLITUS WITHOUT COMPLICATION, WITHOUT LONG-TERM CURRENT USE OF INSULIN (HCC): ICD-10-CM

## 2018-07-23 RX ORDER — ATORVASTATIN CALCIUM 10 MG/1
TABLET, FILM COATED ORAL
Qty: 90 TABLET | Refills: 1 | Status: SHIPPED | OUTPATIENT
Start: 2018-07-23 | End: 2019-04-15

## 2018-08-21 DIAGNOSIS — I10 HYPERTENSION, UNSPECIFIED TYPE: ICD-10-CM

## 2018-08-21 DIAGNOSIS — I10 ESSENTIAL HYPERTENSION: ICD-10-CM

## 2018-08-21 PROCEDURE — 4010F ACE/ARB THERAPY RXD/TAKEN: CPT | Performed by: FAMILY MEDICINE

## 2018-08-21 RX ORDER — HYDROCHLOROTHIAZIDE 25 MG/1
TABLET ORAL
Qty: 90 TABLET | Refills: 1 | Status: SHIPPED | OUTPATIENT
Start: 2018-08-21 | End: 2019-02-18 | Stop reason: SDUPTHER

## 2018-08-21 RX ORDER — QUINAPRIL 40 MG/1
TABLET ORAL
Qty: 90 TABLET | Refills: 1 | Status: SHIPPED | OUTPATIENT
Start: 2018-08-21 | End: 2019-02-18 | Stop reason: SDUPTHER

## 2018-08-22 DIAGNOSIS — K21.00 PEPTIC REFLUX ESOPHAGITIS: ICD-10-CM

## 2018-08-22 RX ORDER — OMEPRAZOLE 20 MG/1
CAPSULE, DELAYED RELEASE ORAL
Qty: 90 CAPSULE | Refills: 1 | Status: SHIPPED | OUTPATIENT
Start: 2018-08-22 | End: 2019-02-18 | Stop reason: SDUPTHER

## 2018-12-12 DIAGNOSIS — E11.69 HYPERLIPIDEMIA ASSOCIATED WITH TYPE 2 DIABETES MELLITUS (HCC): Primary | ICD-10-CM

## 2018-12-12 DIAGNOSIS — E11.9 TYPE 2 DIABETES MELLITUS WITHOUT COMPLICATION, WITHOUT LONG-TERM CURRENT USE OF INSULIN (HCC): ICD-10-CM

## 2018-12-12 DIAGNOSIS — E78.5 HYPERLIPIDEMIA ASSOCIATED WITH TYPE 2 DIABETES MELLITUS (HCC): Primary | ICD-10-CM

## 2018-12-22 LAB
BUN SERPL-MCNC: 23 MG/DL (ref 6–24)
BUN/CREAT SERPL: 21 (ref 9–20)
CALCIUM SERPL-MCNC: 10.3 MG/DL (ref 8.7–10.2)
CHLORIDE SERPL-SCNC: 97 MMOL/L (ref 96–106)
CO2 SERPL-SCNC: 24 MMOL/L (ref 20–29)
CREAT SERPL-MCNC: 1.11 MG/DL (ref 0.76–1.27)
GLUCOSE SERPL-MCNC: 120 MG/DL (ref 65–99)
HBA1C MFR BLD: 6.7 % (ref 4.8–5.6)
POTASSIUM SERPL-SCNC: 4.5 MMOL/L (ref 3.5–5.2)
SL AMB EGFR AFRICAN AMERICAN: 87 ML/MIN/1.73
SL AMB EGFR NON AFRICAN AMERICAN: 75 ML/MIN/1.73
SODIUM SERPL-SCNC: 140 MMOL/L (ref 134–144)

## 2018-12-24 ENCOUNTER — TELEPHONE (OUTPATIENT)
Dept: FAMILY MEDICINE CLINIC | Facility: CLINIC | Age: 53
End: 2018-12-24

## 2018-12-24 NOTE — TELEPHONE ENCOUNTER
----- Message from Matilde Jocye MD sent at 12/22/2018 10:23 AM EST -----  Call patient with lab result-labs look good-due 6 month diabetic check sometime early January

## 2019-01-21 ENCOUNTER — OFFICE VISIT (OUTPATIENT)
Dept: FAMILY MEDICINE CLINIC | Facility: CLINIC | Age: 54
End: 2019-01-21
Payer: COMMERCIAL

## 2019-01-21 VITALS
SYSTOLIC BLOOD PRESSURE: 142 MMHG | WEIGHT: 268 LBS | RESPIRATION RATE: 16 BRPM | OXYGEN SATURATION: 94 % | HEART RATE: 70 BPM | HEIGHT: 67 IN | DIASTOLIC BLOOD PRESSURE: 90 MMHG | BODY MASS INDEX: 42.06 KG/M2

## 2019-01-21 DIAGNOSIS — K21.00 PEPTIC REFLUX ESOPHAGITIS: ICD-10-CM

## 2019-01-21 DIAGNOSIS — E11.69 HYPERLIPIDEMIA ASSOCIATED WITH TYPE 2 DIABETES MELLITUS (HCC): ICD-10-CM

## 2019-01-21 DIAGNOSIS — Z12.5 ENCOUNTER FOR PROSTATE CANCER SCREENING: ICD-10-CM

## 2019-01-21 DIAGNOSIS — Z00.00 ENCOUNTER FOR WELLNESS EXAMINATION IN ADULT: ICD-10-CM

## 2019-01-21 DIAGNOSIS — Z12.11 ENCOUNTER FOR SCREENING FECAL OCCULT BLOOD TESTING: ICD-10-CM

## 2019-01-21 DIAGNOSIS — E78.5 HYPERLIPIDEMIA ASSOCIATED WITH TYPE 2 DIABETES MELLITUS (HCC): ICD-10-CM

## 2019-01-21 DIAGNOSIS — E11.9 TYPE 2 DIABETES MELLITUS WITHOUT COMPLICATION, WITHOUT LONG-TERM CURRENT USE OF INSULIN (HCC): Primary | ICD-10-CM

## 2019-01-21 DIAGNOSIS — E66.01 MORBID OBESITY (HCC): ICD-10-CM

## 2019-01-21 DIAGNOSIS — I10 BENIGN ESSENTIAL HYPERTENSION: ICD-10-CM

## 2019-01-21 PROCEDURE — 99214 OFFICE O/P EST MOD 30 MIN: CPT | Performed by: FAMILY MEDICINE

## 2019-01-21 PROCEDURE — 99396 PREV VISIT EST AGE 40-64: CPT | Performed by: FAMILY MEDICINE

## 2019-01-21 PROCEDURE — 3008F BODY MASS INDEX DOCD: CPT | Performed by: FAMILY MEDICINE

## 2019-01-21 PROCEDURE — 1036F TOBACCO NON-USER: CPT | Performed by: FAMILY MEDICINE

## 2019-01-21 RX ORDER — LOTEPREDNOL ETABONATE 2 MG/ML
1 SUSPENSION/ DROPS OPHTHALMIC DAILY
Refills: 2 | COMMUNITY
Start: 2019-01-17 | End: 2019-07-15 | Stop reason: ALTCHOICE

## 2019-01-21 NOTE — ASSESSMENT & PLAN NOTE
Lab Results   Component Value Date    HGBA1C 6 7 (H) 12/21/2018       No results for input(s): POCGLU in the last 72 hours  Blood Sugar Average: Last 72 hrs:  Last levels were good  Will continue same medication  Continue to improve diet get back to weight loss mode

## 2019-01-21 NOTE — ASSESSMENT & PLAN NOTE
Lab Results   Component Value Date    HGBA1C 6 7 (H) 12/21/2018       No results for input(s): POCGLU in the last 72 hours  Blood Sugar Average: Last 72 hrs:  Good A1c level is 6 7%  Patient has lost approximately 10 lb since last visit  Had lost more but gained some back over the holidays  Will continue same medications in repeat levels in 6 months

## 2019-01-21 NOTE — PATIENT INSTRUCTIONS
Health maintenance-generally up-to-date on things  We did discuss advanced directives-forms are provided  Depression screen negative  Hepatitis-C negative in the past   Metabolic screens are current  Vaccines are up-to-date with the exception of possible shingles vaccine at the pharmacy or here when available  Will order PSA with next labs in 6 months  You agree to do stool sample for blood in lieu of colonoscopy  Medical management-continue current medications  Recheck labs in 6 months with office visit after  Resume improved diet and exercise patterns to decrease weight  You should have yearly eye exams  Obesity   AMBULATORY CARE:   Obesity  is when your body mass index (BMI) is greater than 30  Your healthcare provider will use your height and weight to measure your BMI  The risks of obesity include  many health problems, such as injuries or physical disability  You may need tests to check for the following:  · Diabetes     · High blood pressure or high cholesterol     · Heart disease     · Gallbladder or liver disease     · Cancer of the colon, breast, prostate, liver, or kidney     · Sleep apnea     · Arthritis or gout  Seek care immediately if:   · You have a severe headache, confusion, or difficulty speaking  · You have weakness on one side of your body  · You have chest pain, sweating, or shortness of breath  Contact your healthcare provider if:   · You have symptoms of gallbladder or liver disease, such as pain in your upper abdomen  · You have knee or hip pain and discomfort while walking  · You have symptoms of diabetes, such as intense hunger and thirst, and frequent urination  · You have symptoms of sleep apnea, such as snoring or daytime sleepiness  · You have questions or concerns about your condition or care  Treatment for obesity  focuses on helping you lose weight to improve your health   Even a small decrease in BMI can reduce the risk for many health problems  Your healthcare provider will help you set a weight-loss goal   · Lifestyle changes  are the first step in treating obesity  These include making healthy food choices and getting regular physical activity  Your healthcare provider may suggest a weight-loss program that involves coaching, education, and therapy  · Medicine  may help you lose weight when it is used with a healthy diet and physical activity  · Surgery  can help you lose weight if you are very obese and have other health problems  There are several types of weight-loss surgery  Ask your healthcare provider for more information  Be successful losing weight:   · Set small, realistic goals  An example of a small goal is to walk for 20 minutes 5 days a week  Anther goal is to lose 5% of your body weight  · Tell friends, family members, and coworkers about your goals  and ask for their support  Ask a friend to lose weight with you, or join a weight-loss support group  · Identify foods or triggers that may cause you to overeat , and find ways to avoid them  Remove tempting high-calorie foods from your home and workplace  Place a bowl of fresh fruit on your kitchen counter  If stress causes you to eat, then find other ways to cope with stress  · Keep a diary to track what you eat and drink  Also write down how many minutes of physical activity you do each day  Weigh yourself once a week and record it in your diary  Eating changes: You will need to eat 500 to 1,000 fewer calories each day than you currently eat to lose 1 to 2 pounds a week  The following changes will help you cut calories:  · Eat smaller portions  Use small plates, no larger than 9 inches in diameter  Fill your plate half full of fruits and vegetables  Measure your food using measuring cups until you know what a serving size looks like  · Eat 3 meals and 1 or 2 snacks each day  Plan your meals in advance  Roldan Watters and eat at home most of the time  Eat slowly  · Eat fruits and vegetables at every meal   They are low in calories and high in fiber, which makes you feel full  Do not add butter, margarine, or cream sauce to vegetables  Use herbs to season steamed vegetables  · Eat less fat and fewer fried foods  Eat more baked or grilled chicken and fish  These protein sources are lower in calories and fat than red meat  Limit fast food  Dress your salads with olive oil and vinegar instead of bottled dressing  · Limit the amount of sugar you eat  Do not drink sugary beverages  Limit alcohol  Activity changes:  Physical activity is good for your body in many ways  It helps you burn calories and build strong muscles  It decreases stress and depression, and improves your mood  It can also help you sleep better  Talk to your healthcare provider before you begin an exercise program   · Exercise for at least 30 minutes 5 days a week  Start slowly  Set aside time each day for physical activity that you enjoy and that is convenient for you  It is best to do both weight training and an activity that increases your heart rate, such as walking, bicycling, or swimming  · Find ways to be more active  Do yard work and housecleaning  Walk up the stairs instead of using elevators  Spend your leisure time going to events that require walking, such as outdoor festivals or fairs  This extra physical activity can help you lose weight and keep it off  Follow up with your healthcare provider as directed: You may need to meet with a dietitian  Write down your questions so you remember to ask them during your visits  © 2017 [employer] Information is for End User's use only and may not be sold, redistributed or otherwise used for commercial purposes  All illustrations and images included in CareNotes® are the copyrighted property of [employer]  or Terence Elder  The above information is an  only   It is not intended as medical advice for individual conditions or treatments  Talk to your doctor, nurse or pharmacist before following any medical regimen to see if it is safe and effective for you  Heart Healthy Diet   AMBULATORY CARE:   A heart healthy diet  is an eating plan low in total fat, unhealthy fats, and sodium (salt)  A heart healthy diet helps decrease your risk for heart disease and stroke  Limit the amount of fat you eat to 25% to 35% of your total daily calories  Limit sodium to less than 2,300 mg each day  Healthy fats:  Healthy fats can help improve cholesterol levels  The risk for heart disease is decreased when cholesterol levels are normal  Choose healthy fats, such as the following:  · Unsaturated fat  is found in foods such as soybean, canola, olive, corn, and safflower oils  It is also found in soft tub margarine that is made with liquid vegetable oil  · Omega-3 fat  is found in certain fish, such as salmon, tuna, and trout, and in walnuts and flaxseed  Unhealthy fats:  Unhealthy fats can cause unhealthy cholesterol levels in your blood and increase your risk of heart disease  Limit unhealthy fats, such as the following:  · Cholesterol  is found in animal foods, such as eggs and lobster, and in dairy products made from whole milk  Limit cholesterol to less than 300 milligrams (mg) each day  You may need to limit cholesterol to 200 mg each day if you have heart disease  · Saturated fat  is found in meats, such as izaguirre and hamburger  It is also found in chicken or turkey skin, whole milk, and butter  Limit saturated fat to less than 7% of your total daily calories  Limit saturated fat to less than 6% if you have heart disease or are at increased risk for it  · Trans fat  is found in packaged foods, such as potato chips and cookies  It is also in hard margarine, some fried foods, and shortening  Avoid trans fats as much as possible    Heart healthy foods and drinks to include:  Ask your dietitian or healthcare provider how many servings to have from each of the following food groups:  · Grains:      ¨ Whole-wheat breads, cereals, and pastas, and brown rice    ¨ Low-fat, low-sodium crackers and chips    · Vegetables:      ¨ Broccoli, green beans, green peas, and spinach    ¨ Collards, kale, and lima beans    ¨ Carrots, sweet potatoes, tomatoes, and peppers    ¨ Canned vegetables with no salt added    · Fruits:      ¨ Bananas, peaches, pears, and pineapple    ¨ Grapes, raisins, and dates    ¨ Oranges, tangerines, grapefruit, orange juice, and grapefruit juice    ¨ Apricots, mangoes, melons, and papaya    ¨ Raspberries and strawberries    ¨ Canned fruit with no added sugar    · Low-fat dairy products:      ¨ Nonfat (skim) milk, 1% milk, and low-fat almond, cashew, or soy milks fortified with calcium    ¨ Low-fat cheese, regular or frozen yogurt, and cottage cheese    · Meats and proteins , such as lean cuts of beef and pork (loin, leg, round), skinless chicken and turkey, legumes, soy products, egg whites, and nuts  Foods and drinks to limit or avoid:  Ask your dietitian or healthcare provider about these and other foods that are high in unhealthy fat, sodium, and sugar:  · Snack or packaged foods , such as frozen dinners, cookies, macaroni and cheese, and cereals with more than 300 mg of sodium per serving    · Canned or dry mixes  for cakes, soups, sauces, or gravies    · Vegetables with added sodium , such as instant potatoes, vegetables with added sauces, or regular canned vegetables    · Other foods high in sodium , such as ketchup, barbecue sauce, salad dressing, pickles, olives, soy sauce, and miso    · High-fat dairy foods  such as whole or 2% milk, cream cheese, or sour cream, and cheeses     · High-fat protein foods  such as high-fat cuts of beef (T-bone steaks, ribs), chicken or turkey with skin, and organ meats, such as liver    · Cured or smoked meats , such as hot dogs, izaguirre, and sausage    · Unhealthy fats and oils , such as butter, stick margarine, shortening, and cooking oils such as coconut or palm oil    · Food and drinks high in sugar , such as soft drinks (soda), sports drinks, sweetened tea, candy, cake, cookies, pies, and doughnuts  Other diet guidelines to follow:   · Eat more foods containing omega-3 fats  Eat fish high in omega-3 fats at least 2 times a week  · Limit alcohol  Too much alcohol can damage your heart and raise your blood pressure  Women should limit alcohol to 1 drink a day  Men should limit alcohol to 2 drinks a day  A drink of alcohol is 12 ounces of beer, 5 ounces of wine, or 1½ ounces of liquor  · Choose low-sodium foods  High-sodium foods can lead to high blood pressure  Add little or no salt to food you prepare  Use herbs and spices in place of salt  · Eat more fiber  to help lower cholesterol levels  Eat at least 5 servings of fruits and vegetables each day  Eat 3 ounces of whole-grain foods each day  Legumes (beans) are also a good source of fiber  Lifestyle guidelines:   · Do not smoke  Nicotine and other chemicals in cigarettes and cigars can cause lung and heart damage  Ask your healthcare provider for information if you currently smoke and need help to quit  E-cigarettes or smokeless tobacco still contain nicotine  Talk to your healthcare provider before you use these products  · Exercise regularly  to help you maintain a healthy weight and improve your blood pressure and cholesterol levels  Ask your healthcare provider about the best exercise plan for you  Do not start an exercise program without asking your healthcare provider  Follow up with your healthcare provider as directed:  Write down your questions so you remember to ask them during your visits  © 2017 [employer] Information is for End User's use only and may not be sold, redistributed or otherwise used for commercial purposes   All illustrations and images included in CareNotes® are the copyrighted property of [employer]  or Terence Elder  The above information is an  only  It is not intended as medical advice for individual conditions or treatments  Talk to your doctor, nurse or pharmacist before following any medical regimen to see if it is safe and effective for you

## 2019-01-21 NOTE — PROGRESS NOTES
HPI:  Deon Vegas is a 46 y o  male here for his yearly health maintenance exam    Patient Active Problem List   Diagnosis    Type 2 diabetes mellitus without complication, without long-term current use of insulin ([facility] )    Benign essential hypertension    Hyperlipidemia associated with type 2 diabetes mellitus ([email or web address]    Peptic reflux esophagitis    Morbid obesity ([facility] )     No past medical history on file  1  Advanced Directive: no     2  Durable Power of  for Healthcare: no     3  Social History:               Marital History:               Work Status: full time              Drug and alcohol History: no drug use              Alcohol Use: rarely     4  General Health: fair              Regular Dental Visits:yes              Vision problems:dry eye, no retinopathy              Hearing Loss:no              Immunizations up to date: yes- consider Shingrix                 Lifestyle:                           Healthy Diet:yes- but less so recently                          [place]                          Regular exercise:some- walking                          Weight concerns:yes                          Drug abuse: none     5  Reproductive Health (females only)              Premenopausal:-              Perimenopausal:-              Postmenopausal: -                 Menstrual Problems: -              Contraceptions: -              Sexually Active: -              Pregnancy History:-     6  PHQ-9 Depression Screening    PHQ-9:    Frequency of the following problems over the past two weeks:       Little interest or pleasure in doing things:  0 - not at all  Feeling down, depressed, or hopeless:  0 - not at all  PHQ-2 Score:  0           Current Outpatient Prescriptions   Medication Sig Dispense Refill    ALREX 0 2 % SUSP Administer 1 drop to both eyes daily  2    atorvastatin (LIPITOR) 10 mg tablet TAKE 1 TABLET DAILY AS DIRECTED   90 tablet 1    hydrochlorothiazide (HYDRODIURIL) 25 mg tablet TAKE 1 TABLET BY MOUTH DAILY 90 tablet 1    metFORMIN (GLUCOPHAGE) 850 mg tablet TAKE 1 TABLET BY MOUTH TWICE A  tablet 0    Misc Natural Products (OSTEO BI-FLEX ADV TRIPLE ST) TABS Take 1 tablet by mouth 2 (two) times a day      omeprazole (PriLOSEC) 20 mg delayed release capsule TAKE 1 CAPSULE DAILY 90 capsule 1    quinapril (ACCUPRIL) 40 MG tablet TAKE 1 TABLET BY MOUTH DAILY 90 tablet 1     No current facility-administered medications for this visit  No Known Allergies  Immunization History   Administered Date(s) Administered    Influenza 08/23/2018    Influenza Quadrivalent Preservative Free 3 years and older IM 12/11/2017    Influenza TIV (IM) 09/10/2016    Pneumococcal Polysaccharide PPV23 07/02/2018    Tdap 03/03/2014       Patient Care Team:  Sushma Lopez MD as PCP - General    Review of Systems      Physical Exam :  Physical Exam   Constitutional: He is oriented to person, place, and time  He appears well-developed and well-nourished  No distress  HENT:   Head: Normocephalic and atraumatic  Right Ear: Tympanic membrane, external ear and ear canal normal    Left Ear: Tympanic membrane, external ear and ear canal normal    Nose: No mucosal edema or rhinorrhea  Right sinus exhibits no maxillary sinus tenderness  Left sinus exhibits no maxillary sinus tenderness  Mouth/Throat: Uvula is midline  Mucous membranes are not pale and not dry  Normal dentition  No oropharyngeal exudate  Eyes: Pupils are equal, round, and reactive to light  EOM are normal  Right eye exhibits no discharge  Left eye exhibits no discharge  Neck: Normal range of motion  Neck supple  No thyromegaly present  Cardiovascular: Normal rate, regular rhythm, normal heart sounds and intact distal pulses  No murmur heard  Pulmonary/Chest: Effort normal and breath sounds normal  No respiratory distress  He has no wheezes  He has no rales  Abdominal: Soft  He exhibits no distension and no mass   There is no rebound and no guarding  Musculoskeletal: Normal range of motion  He exhibits no edema or tenderness  Lymphadenopathy:     He has no cervical adenopathy  Neurological: He is alert and oriented to person, place, and time  No cranial nerve deficit  Skin: He is not diaphoretic  Psychiatric: He has a normal mood and affect  His behavior is normal          Reviewed Updated [facility] Prior Wellness Visits:   Last Health Maintenance visit information was reviewed, patient interviewed , no change since last HM visit na  Last HM visit information was reviewed, patient interviewed and updates made to the record today yes    Assessment and Plan:  1  Encounter for wellness examination in adult     2  Encounter for screening fecal occult blood testing  Occult Blood, Fecal, IA    Occult Blood, Fecal, IA   3  Encounter for prostate cancer screening       Health maintenance-generally up-to-date on things  We did discuss advanced directives-forms are provided  Depression screen negative  Hepatitis-C negative in the past   Metabolic screens are current  Vaccines are up-to-date with the exception of possible shingles vaccine at the pharmacy or here when available  Will order PSA with next labs in 6 months  You agree to do stool sample for blood in lieu of colonoscopy      Health Maintenance Due   Topic Date Due    CRC Screening: Colonoscopy  1965    CRC Screening: FOBTx3/FIT  07/26/2014    Diabetic Foot Exam  12/11/2018

## 2019-01-21 NOTE — ASSESSMENT & PLAN NOTE
Patient attempted switch to H2 blocker  Get side effects from the famotidine and had some increased symptoms  He is return to his map result

## 2019-01-21 NOTE — PROGRESS NOTES
Subjective:   Chief Complaint   Patient presents with    Follow-up     mm - review labs,     Annual Exam             Patient ID: Lorenzo Nava is a 46 y o  male  Medical management-  1) diabetes-good control current medications  Patient had lost more weight but is put back on approximately 15 lb  Fasting sugar improved  2) hyperlipidemia-previous levels had been good  Tolerates current medication  3) hypertension-slightly high today  Usually runs 130/80 at home  4) GERD-patient had switch try to switch to H2 blocker and got side effects from famotidine  Has return to omeprazole 20 mg daily  5) obesity-patient had lost approximately 15 lb but has put some back on the last 2-3 months  The following portions of the patient's history were reviewed and updated as appropriate: allergies, current medications, past family history, past medical history, past social history, past surgical history and problem list     Review of Systems   Constitutional: Negative for activity change, appetite change, diaphoresis and fatigue  Respiratory: Negative for cough, chest tightness, shortness of breath and wheezing  Cardiovascular: Negative for chest pain, palpitations and leg swelling  Fast or slow heart rate   Gastrointestinal: Negative for abdominal pain, blood in stool, constipation, diarrhea, nausea and vomiting  Genitourinary: Negative for difficulty urinating, dysuria and frequency  Musculoskeletal: Negative for arthralgias, gait problem, joint swelling and myalgias  Neurological: Negative for dizziness, weakness, light-headedness, numbness and headaches  Psychiatric/Behavioral: Negative for agitation, confusion, dysphoric mood and sleep disturbance  The patient is not nervous/anxious            Objective:  Vitals:    01/21/19 1703   BP: 142/90   Pulse: 70   Resp: 16   SpO2: 94%   Weight: 122 kg (268 lb)   Height: 5' 7" (1 702 m)      Physical Exam   Constitutional: He is oriented to person, place, and time  He appears well-developed and well-nourished  No distress  HENT:   Head: Normocephalic and atraumatic  Right Ear: Tympanic membrane, external ear and ear canal normal    Left Ear: Tympanic membrane, external ear and ear canal normal    Nose: No mucosal edema or rhinorrhea  Right sinus exhibits no maxillary sinus tenderness  Left sinus exhibits no maxillary sinus tenderness  Mouth/Throat: Uvula is midline  Mucous membranes are not pale and not dry  Normal dentition  No oropharyngeal exudate  Eyes: Pupils are equal, round, and reactive to light  EOM are normal  Right eye exhibits no discharge  Left eye exhibits no discharge  Neck: Normal range of motion  Neck supple  No thyromegaly present  Cardiovascular: Normal rate, regular rhythm, normal heart sounds and intact distal pulses  Pulses are no weak pulses  No murmur heard  Pulses:       Dorsalis pedis pulses are 2+ on the right side, and 2+ on the left side  Posterior tibial pulses are 2+ on the right side, and 2+ on the left side  Pulmonary/Chest: Effort normal and breath sounds normal  No respiratory distress  He has no wheezes  He has no rales  Musculoskeletal: Normal range of motion  He exhibits no edema or tenderness  Feet:   Right Foot:   Skin Integrity: Negative for ulcer, skin breakdown, erythema, warmth, callus or dry skin  Left Foot:   Skin Integrity: Negative for ulcer, skin breakdown, erythema, warmth, callus or dry skin  Lymphadenopathy:     He has no cervical adenopathy  Neurological: He is alert and oriented to person, place, and time  No cranial nerve deficit  Skin: He is not diaphoretic  Psychiatric: He has a normal mood and affect  His behavior is normal        Patient's shoes and socks removed  Right Foot/Ankle   Right Foot Inspection  Skin Exam: skin normal and skin intact no dry skin, no warmth, no callus, no erythema, no maceration, no abnormal color, no pre-ulcer, no ulcer and no callus                            Sensory   Vibration: intact  Proprioception: intact   Monofilament testing: intact  Vascular  Capillary refills: < 3 seconds  The right DP pulse is 2+  The right PT pulse is 2+  Left Foot/Ankle  Left Foot Inspection  Skin Exam: skin normal and skin intactno dry skin, no warmth, no erythema, no maceration, normal color, no pre-ulcer, no ulcer and no callus                                         Sensory   Vibration: intact  Proprioception: intact  Monofilament: intact  Vascular  Capillary refills: < 3 seconds  The left DP pulse is 2+  The left PT pulse is 2+  Assign Risk Category:  No deformity present; No loss of protective sensation; No weak pulses       Risk: 0    Assessment/Plan:    Type 2 diabetes mellitus without complication, without long-term current use of insulin ([facility])  Lab Results   Component Value Date    HGBA1C 6 7 (H) 12/21/2018       No results for input(s): POCGLU in the last 72 hours  Blood Sugar Average: Last 72 hrs:  Good A1c level is 6 7%  Patient has lost approximately 10 lb since last visit  Had lost more but gained some back over the holidays  Will continue same medications in repeat levels in 6 months  Peptic reflux esophagitis  Patient attempted switch to H2 blocker  Get side effects from the famotidine and had some increased symptoms  He is return to his map result  Hyperlipidemia associated with type 2 diabetes mellitus ([facility] [address] )  Lab Results   Component Value Date    HGBA1C 6 7 (H) 12/21/2018       No results for input(s): POCGLU in the last 72 hours  Blood Sugar Average: Last 72 hrs:  Last levels were good  Will continue same medication  Continue to improve diet get back to weight loss mode  Benign essential hypertension  Adequate control current medication  Continue same recheck 6 months         Diagnoses and all orders for this visit:    Type 2 diabetes mellitus without complication, without long-term current use of insulin ([place] [address] )  -     Hemoglobin A1C; Future  -     Comprehensive metabolic panel; Future  -     Microalbumin / creatinine urine ratio; Future  -     Lipid panel; Future  -     Hemoglobin A1C  -     Comprehensive metabolic panel  -     Microalbumin / creatinine urine ratio  -     Lipid panel    Encounter for wellness examination in adult    Encounter for screening fecal occult blood testing  -     Occult Blood, Fecal, IA; Future  -     Occult Blood, Fecal, IA    Encounter for prostate cancer screening  -     PSA Total (Reflex To Free); Future  -     PSA Total (Reflex To Free)    Hyperlipidemia associated with type 2 diabetes mellitus ([place] [address] )  -     Comprehensive metabolic panel; Future  -     Lipid panel; Future  -     Comprehensive metabolic panel  -     Lipid panel    Benign essential hypertension  -     Comprehensive metabolic panel; Future  -     Comprehensive metabolic panel    Peptic reflux esophagitis    Morbid obesity (HCC)    Other orders  -     ALREX 0 2 % SUSP; Administer 1 drop to both eyes daily          BMI Counseling: Body mass index is 41 97 kg/m²  Discussed the patient's BMI with him  The BMI is above average  BMI counseling and education was provided to the patient  Nutrition recommendations include reducing portion sizes, decreasing overall calorie intake, 3-5 servings of fruits/vegetables daily, moderation in carbohydrate intake and increasing intake of lean protein  Exercise recommendations include moderate aerobic physical activity for 150 minutes/week and exercising 3-5 times per week

## 2019-02-18 DIAGNOSIS — I10 ESSENTIAL HYPERTENSION: ICD-10-CM

## 2019-02-18 DIAGNOSIS — I10 HYPERTENSION, UNSPECIFIED TYPE: ICD-10-CM

## 2019-02-18 DIAGNOSIS — K21.00 PEPTIC REFLUX ESOPHAGITIS: ICD-10-CM

## 2019-02-18 PROCEDURE — 4010F ACE/ARB THERAPY RXD/TAKEN: CPT | Performed by: FAMILY MEDICINE

## 2019-02-18 RX ORDER — HYDROCHLOROTHIAZIDE 25 MG/1
TABLET ORAL
Qty: 90 TABLET | Refills: 1 | Status: SHIPPED | OUTPATIENT
Start: 2019-02-18 | End: 2019-08-19 | Stop reason: SDUPTHER

## 2019-02-18 RX ORDER — QUINAPRIL 40 MG/1
TABLET ORAL
Qty: 90 TABLET | Refills: 1 | Status: SHIPPED | OUTPATIENT
Start: 2019-02-18 | End: 2019-08-19 | Stop reason: SDUPTHER

## 2019-02-18 RX ORDER — OMEPRAZOLE 20 MG/1
CAPSULE, DELAYED RELEASE ORAL
Qty: 90 CAPSULE | Refills: 1 | Status: SHIPPED | OUTPATIENT
Start: 2019-02-18 | End: 2019-08-19 | Stop reason: SDUPTHER

## 2019-02-22 LAB
HEMOCCULT STL QL IA: NEGATIVE
LABCORP COMMENT: NORMAL

## 2019-02-26 ENCOUNTER — TELEPHONE (OUTPATIENT)
Dept: FAMILY MEDICINE CLINIC | Facility: CLINIC | Age: 54
End: 2019-02-26

## 2019-02-26 NOTE — TELEPHONE ENCOUNTER
----- Message from Evans Archuleta MD sent at 2/23/2019  8:37 AM EST -----  Call patient with lab result-stool neg for blood- record as Cancer Screen?

## 2019-03-18 DIAGNOSIS — E11.9 TYPE 2 DIABETES MELLITUS WITHOUT COMPLICATION, WITHOUT LONG-TERM CURRENT USE OF INSULIN (HCC): ICD-10-CM

## 2019-04-13 DIAGNOSIS — E78.5 HYPERLIPIDEMIA ASSOCIATED WITH TYPE 2 DIABETES MELLITUS (HCC): ICD-10-CM

## 2019-04-13 DIAGNOSIS — E11.69 HYPERLIPIDEMIA ASSOCIATED WITH TYPE 2 DIABETES MELLITUS (HCC): ICD-10-CM

## 2019-04-15 RX ORDER — ATORVASTATIN CALCIUM 10 MG/1
TABLET, FILM COATED ORAL
Qty: 90 TABLET | Refills: 0 | Status: SHIPPED | OUTPATIENT
Start: 2019-04-15 | End: 2019-11-06 | Stop reason: SDUPTHER

## 2019-05-06 DIAGNOSIS — E11.9 TYPE 2 DIABETES MELLITUS WITHOUT COMPLICATION, WITHOUT LONG-TERM CURRENT USE OF INSULIN (HCC): ICD-10-CM

## 2019-06-29 LAB
ALBUMIN SERPL-MCNC: 5.3 G/DL (ref 3.5–5.5)
ALBUMIN/CREAT UR: 15.8 MG/G CREAT (ref 0–30)
ALBUMIN/GLOB SERPL: 2.2 {RATIO} (ref 1.2–2.2)
ALP SERPL-CCNC: 51 IU/L (ref 39–117)
ALT SERPL-CCNC: 81 IU/L (ref 0–44)
AST SERPL-CCNC: 40 IU/L (ref 0–40)
BILIRUB SERPL-MCNC: 0.7 MG/DL (ref 0–1.2)
BUN SERPL-MCNC: 23 MG/DL (ref 6–24)
BUN/CREAT SERPL: 20 (ref 9–20)
CALCIUM SERPL-MCNC: 9.9 MG/DL (ref 8.7–10.2)
CHLORIDE SERPL-SCNC: 94 MMOL/L (ref 96–106)
CHOLEST SERPL-MCNC: 124 MG/DL (ref 100–199)
CHOLEST/HDLC SERPL: 4.4 RATIO (ref 0–5)
CO2 SERPL-SCNC: 25 MMOL/L (ref 20–29)
CREAT SERPL-MCNC: 1.13 MG/DL (ref 0.76–1.27)
CREAT UR-MCNC: 135.1 MG/DL
EST. AVERAGE GLUCOSE BLD GHB EST-MCNC: 157 MG/DL
GLOBULIN SER-MCNC: 2.4 G/DL (ref 1.5–4.5)
GLUCOSE SERPL-MCNC: 114 MG/DL (ref 65–99)
HBA1C MFR BLD: 7.1 % (ref 4.8–5.6)
HDLC SERPL-MCNC: 28 MG/DL
LDLC SERPL CALC-MCNC: 73 MG/DL (ref 0–99)
MICROALBUMIN UR-MCNC: 21.4 UG/ML
POTASSIUM SERPL-SCNC: 4.2 MMOL/L (ref 3.5–5.2)
PROT SERPL-MCNC: 7.7 G/DL (ref 6–8.5)
PSA SERPL-MCNC: 0.3 NG/ML (ref 0–4)
SL AMB EGFR AFRICAN AMERICAN: 85 ML/MIN/1.73
SL AMB EGFR NON AFRICAN AMERICAN: 73 ML/MIN/1.73
SL AMB REFLEX CRITERIA: NORMAL
SL AMB VLDL CHOLESTEROL CALC: 23 MG/DL (ref 5–40)
SODIUM SERPL-SCNC: 135 MMOL/L (ref 134–144)
TRIGL SERPL-MCNC: 113 MG/DL (ref 0–149)

## 2019-06-29 PROCEDURE — 3045F PR MOST RECENT HEMOGLOBIN A1C LEVEL 7.0-9.0%: CPT | Performed by: FAMILY MEDICINE

## 2019-07-01 ENCOUNTER — TELEPHONE (OUTPATIENT)
Dept: FAMILY MEDICINE CLINIC | Facility: CLINIC | Age: 54
End: 2019-07-01

## 2019-07-01 NOTE — TELEPHONE ENCOUNTER
----- Message from Mirna Jones MD sent at 7/1/2019  1:01 PM EDT -----  Call patient with lab result-OK, due 6 month OV in July

## 2019-07-15 ENCOUNTER — OFFICE VISIT (OUTPATIENT)
Dept: FAMILY MEDICINE CLINIC | Facility: CLINIC | Age: 54
End: 2019-07-15
Payer: COMMERCIAL

## 2019-07-15 VITALS
SYSTOLIC BLOOD PRESSURE: 142 MMHG | BODY MASS INDEX: 42.25 KG/M2 | HEART RATE: 73 BPM | WEIGHT: 269.18 LBS | OXYGEN SATURATION: 97 % | DIASTOLIC BLOOD PRESSURE: 94 MMHG | HEIGHT: 67 IN | TEMPERATURE: 97.7 F

## 2019-07-15 DIAGNOSIS — E78.5 HYPERLIPIDEMIA ASSOCIATED WITH TYPE 2 DIABETES MELLITUS (HCC): ICD-10-CM

## 2019-07-15 DIAGNOSIS — E11.9 TYPE 2 DIABETES MELLITUS WITHOUT COMPLICATION, WITHOUT LONG-TERM CURRENT USE OF INSULIN (HCC): Primary | ICD-10-CM

## 2019-07-15 DIAGNOSIS — I10 BENIGN ESSENTIAL HYPERTENSION: ICD-10-CM

## 2019-07-15 DIAGNOSIS — E66.01 MORBID OBESITY (HCC): ICD-10-CM

## 2019-07-15 DIAGNOSIS — R79.89 ELEVATED LFTS: ICD-10-CM

## 2019-07-15 DIAGNOSIS — E11.69 HYPERLIPIDEMIA ASSOCIATED WITH TYPE 2 DIABETES MELLITUS (HCC): ICD-10-CM

## 2019-07-15 PROCEDURE — 3008F BODY MASS INDEX DOCD: CPT | Performed by: FAMILY MEDICINE

## 2019-07-15 PROCEDURE — 99214 OFFICE O/P EST MOD 30 MIN: CPT | Performed by: FAMILY MEDICINE

## 2019-07-15 PROCEDURE — 1036F TOBACCO NON-USER: CPT | Performed by: FAMILY MEDICINE

## 2019-07-15 NOTE — PROGRESS NOTES
[address]         NAME: Hi Araujo is a 46 y o  male  : 1965    MRN: [id number]  DATE: July 15, 2019  TIME: 5:47 PM    Assessment and Plan   Type 2 diabetes mellitus without complication, without long-term current use of insulin ([address] ) [E11 9]  1  Type 2 diabetes mellitus without complication, without long-term current use of insulin ([address] )     2  Hyperlipidemia associated with type 2 diabetes mellitus ([address] )     3  Benign essential hypertension     4  Morbid obesity ([address] )         No problem-specific Assessment & Plan notes found for this encounter  Patient Instructions     There are no Patient Instructions on file for this visit  Chief Complaint     Chief Complaint   Patient presents with    Follow-up     med check         History of Present Illness       1) hypertension-patient does check his blood pressure at home usual ranges between 120-130 over 70-85  Slightly elevated in the office today  This is not unusual for him  2) diabetes-A1c at 7 1  3) hyperlipidemia-good levels  Except HDL is still low  4) GERD-patient doing well with omeprazole 20 mg   Had not been able to switch to H2 blocker in the past       Review of Systems   Review of Systems   Constitutional: Negative for activity change, appetite change, diaphoresis and fatigue  Respiratory: Negative for cough, chest tightness, shortness of breath and wheezing  Cardiovascular: Negative for chest pain, palpitations and leg swelling  Fast or slow heart rate   Gastrointestinal: Negative for abdominal pain, blood in stool, constipation, diarrhea, nausea and vomiting  Genitourinary: Negative for difficulty urinating, dysuria and frequency  Musculoskeletal: Negative for arthralgias, gait problem, joint swelling and myalgias  Neurological: Negative for dizziness, light-headedness and headaches     Psychiatric/Behavioral: Negative for agitation, confusion, dysphoric mood and sleep disturbance  The patient is not nervous/anxious  Current Medications       Current Outpatient Medications:     atorvastatin (LIPITOR) 10 mg tablet, TAKE 1 TABLET DAILY AS INSTRUCTED, Disp: 90 tablet, Rfl: 0    hydrochlorothiazide (HYDRODIURIL) 25 mg tablet, TAKE 1 TABLET BY MOUTH DAILY, Disp: 90 tablet, Rfl: 1    metFORMIN (GLUCOPHAGE) 850 mg tablet, TAKE 1 TABLET BY MOUTH TWICE A DAY, Disp: 180 tablet, Rfl: 0    Misc Natural Products (OSTEO BI-FLEX ADV TRIPLE ST) TABS, Take 1 tablet by mouth 2 (two) times a day, Disp: , Rfl:     omeprazole (PriLOSEC) 20 mg delayed release capsule, TAKE 1 CAPSULE DAILY, Disp: 90 capsule, Rfl: 1    quinapril (ACCUPRIL) 40 MG tablet, TAKE 1 TABLET BY MOUTH DAILY, Disp: 90 tablet, Rfl: 1    Current Allergies     Allergies as of 07/15/2019    (No Known Allergies)            The following portions of the patient's history were reviewed and updated as appropriate: allergies, current medications, past family history, past medical history, past social history, past surgical history and problem list      History reviewed  No pertinent past medical history  History reviewed  No pertinent surgical history  Family History   Problem Relation Age of Onset    Diabetes Father     Hyperlipidemia Father     Bipolar disorder Sister         1 disorder    Other Sister         hx of suicide    Lung cancer Mother     Substance Abuse Neg Hx          Medications have been verified  Objective   /94 (BP Location: Left arm, Patient Position: Sitting, Cuff Size: Standard)   Pulse 73   Temp 97 7 °F (36 5 °C) (Tympanic)   Ht 5' 7" (1 702 m)   Wt 122 kg (269 lb 2 9 oz)   SpO2 97%   BMI 42 16 kg/m²        Physical Exam     Physical Exam   Constitutional: He is oriented to person, place, and time  He appears well-developed and well-nourished  No distress  HENT:   Head: Normocephalic and atraumatic  Right Ear: Tympanic membrane and external ear normal  No drainage  Left Ear: Tympanic membrane normal  No drainage  Mouth/Throat: No oropharyngeal exudate  Eyes: Conjunctivae and EOM are normal  Right eye exhibits no discharge  Left eye exhibits no discharge  Neck: Normal range of motion  Neck supple  No thyromegaly present  Cardiovascular: Normal rate, regular rhythm and normal heart sounds  Pulmonary/Chest: Effort normal  No respiratory distress  He has no wheezes  He has no rales  Lymphadenopathy:     He has no cervical adenopathy  Neurological: He is alert and oriented to person, place, and time  Psychiatric: He has a normal mood and affect   His behavior is normal

## 2019-07-15 NOTE — PATIENT INSTRUCTIONS
Continue current medications  Continue to monitor blood pressure with goal of generally less than 130/80  Try to improve the diet and exercise pattern to help with decreased sugars and lose the weight  I would recheck labs in about 6 months with office visit after  Consider getting shingles vaccine at next visit  Get your eye exam as scheduled  Fit testing negative for this year  If she is still reluctant to have colonoscopy, I would wait and do a Cologuard early next year

## 2019-08-13 DIAGNOSIS — E11.9 TYPE 2 DIABETES MELLITUS WITHOUT COMPLICATION, WITHOUT LONG-TERM CURRENT USE OF INSULIN (HCC): ICD-10-CM

## 2019-08-19 DIAGNOSIS — K21.00 PEPTIC REFLUX ESOPHAGITIS: ICD-10-CM

## 2019-08-19 DIAGNOSIS — I10 ESSENTIAL HYPERTENSION: ICD-10-CM

## 2019-08-19 DIAGNOSIS — I10 HYPERTENSION, UNSPECIFIED TYPE: ICD-10-CM

## 2019-08-19 PROCEDURE — 4010F ACE/ARB THERAPY RXD/TAKEN: CPT | Performed by: FAMILY MEDICINE

## 2019-08-19 RX ORDER — QUINAPRIL 40 MG/1
TABLET ORAL
Qty: 90 TABLET | Refills: 1 | Status: SHIPPED | OUTPATIENT
Start: 2019-08-19 | End: 2020-02-17

## 2019-08-19 RX ORDER — OMEPRAZOLE 20 MG/1
CAPSULE, DELAYED RELEASE ORAL
Qty: 90 CAPSULE | Refills: 1 | Status: SHIPPED | OUTPATIENT
Start: 2019-08-19 | End: 2020-02-17

## 2019-08-19 RX ORDER — HYDROCHLOROTHIAZIDE 25 MG/1
TABLET ORAL
Qty: 90 TABLET | Refills: 1 | Status: SHIPPED | OUTPATIENT
Start: 2019-08-19 | End: 2020-02-17

## 2019-08-20 LAB
LEFT EYE DIABETIC RETINOPATHY: NORMAL
RIGHT EYE DIABETIC RETINOPATHY: NORMAL

## 2019-08-20 PROCEDURE — 3072F LOW RISK FOR RETINOPATHY: CPT | Performed by: FAMILY MEDICINE

## 2019-10-27 LAB
ALBUMIN SERPL-MCNC: 4.6 G/DL (ref 3.5–5.5)
ALBUMIN/GLOB SERPL: 1.7 {RATIO} (ref 1.2–2.2)
ALP SERPL-CCNC: 51 IU/L (ref 39–117)
ALT SERPL-CCNC: 53 IU/L (ref 0–44)
AST SERPL-CCNC: 28 IU/L (ref 0–40)
BILIRUB SERPL-MCNC: 0.5 MG/DL (ref 0–1.2)
BUN SERPL-MCNC: 18 MG/DL (ref 6–24)
BUN/CREAT SERPL: 17 (ref 9–20)
CALCIUM SERPL-MCNC: 9.8 MG/DL (ref 8.7–10.2)
CHLORIDE SERPL-SCNC: 101 MMOL/L (ref 96–106)
CO2 SERPL-SCNC: 24 MMOL/L (ref 20–29)
CREAT SERPL-MCNC: 1.03 MG/DL (ref 0.76–1.27)
EST. AVERAGE GLUCOSE BLD GHB EST-MCNC: 151 MG/DL
GLOBULIN SER-MCNC: 2.7 G/DL (ref 1.5–4.5)
GLUCOSE SERPL-MCNC: 99 MG/DL (ref 65–99)
HBA1C MFR BLD: 6.9 % (ref 4.8–5.6)
POTASSIUM SERPL-SCNC: 4.4 MMOL/L (ref 3.5–5.2)
PROT SERPL-MCNC: 7.3 G/DL (ref 6–8.5)
SL AMB EGFR AFRICAN AMERICAN: 94 ML/MIN/1.73
SL AMB EGFR NON AFRICAN AMERICAN: 81 ML/MIN/1.73
SODIUM SERPL-SCNC: 142 MMOL/L (ref 134–144)

## 2019-10-28 ENCOUNTER — TELEPHONE (OUTPATIENT)
Dept: FAMILY MEDICINE CLINIC | Facility: CLINIC | Age: 54
End: 2019-10-28

## 2019-10-28 NOTE — TELEPHONE ENCOUNTER
----- Message from Lachelle Kenny MD sent at 10/28/2019  8:30 AM EDT -----  Call patient with lab result-labs look good-due office visit January 2020  Call before to see if lab orders are needed

## 2019-10-28 NOTE — RESULT ENCOUNTER NOTE
Call patient with lab result-labs look good-due office visit January 2020  Call before to see if lab orders are needed

## 2019-11-06 DIAGNOSIS — E78.5 HYPERLIPIDEMIA ASSOCIATED WITH TYPE 2 DIABETES MELLITUS (HCC): ICD-10-CM

## 2019-11-06 DIAGNOSIS — E11.69 HYPERLIPIDEMIA ASSOCIATED WITH TYPE 2 DIABETES MELLITUS (HCC): ICD-10-CM

## 2019-11-06 RX ORDER — ATORVASTATIN CALCIUM 10 MG/1
TABLET, FILM COATED ORAL
Qty: 90 TABLET | Refills: 0 | Status: SHIPPED | OUTPATIENT
Start: 2019-11-06 | End: 2020-01-02 | Stop reason: SDUPTHER

## 2019-11-27 DIAGNOSIS — E11.9 TYPE 2 DIABETES MELLITUS WITHOUT COMPLICATION, WITHOUT LONG-TERM CURRENT USE OF INSULIN (HCC): ICD-10-CM

## 2020-01-02 ENCOUNTER — OFFICE VISIT (OUTPATIENT)
Dept: FAMILY MEDICINE CLINIC | Facility: CLINIC | Age: 55
End: 2020-01-02
Payer: COMMERCIAL

## 2020-01-02 VITALS
TEMPERATURE: 97.2 F | BODY MASS INDEX: 39.4 KG/M2 | HEIGHT: 68 IN | WEIGHT: 260 LBS | OXYGEN SATURATION: 98 % | HEART RATE: 106 BPM | SYSTOLIC BLOOD PRESSURE: 138 MMHG | DIASTOLIC BLOOD PRESSURE: 88 MMHG

## 2020-01-02 DIAGNOSIS — E11.69 HYPERLIPIDEMIA ASSOCIATED WITH TYPE 2 DIABETES MELLITUS (HCC): ICD-10-CM

## 2020-01-02 DIAGNOSIS — E78.5 HYPERLIPIDEMIA ASSOCIATED WITH TYPE 2 DIABETES MELLITUS (HCC): ICD-10-CM

## 2020-01-02 DIAGNOSIS — R19.7 DIARRHEA OF PRESUMED INFECTIOUS ORIGIN: Primary | ICD-10-CM

## 2020-01-02 PROCEDURE — 1036F TOBACCO NON-USER: CPT | Performed by: FAMILY MEDICINE

## 2020-01-02 PROCEDURE — 3008F BODY MASS INDEX DOCD: CPT | Performed by: FAMILY MEDICINE

## 2020-01-02 PROCEDURE — 99214 OFFICE O/P EST MOD 30 MIN: CPT | Performed by: FAMILY MEDICINE

## 2020-01-02 RX ORDER — ATORVASTATIN CALCIUM 10 MG/1
10 TABLET, FILM COATED ORAL DAILY
Qty: 90 TABLET | Refills: 1 | Status: SHIPPED | OUTPATIENT
Start: 2020-01-02 | End: 2020-06-29

## 2020-01-02 NOTE — PATIENT INSTRUCTIONS
Check stool samples on labs as ordered  Will need to wait for these results before any specific treatment  I would keep herself well hydrated  Avoid large meals or very fatty foods this may make the diarrhea worse

## 2020-01-02 NOTE — PROGRESS NOTES
[id number]         NAME: Luan Gracia is a 53 y o  male  : 1965    MRN: [id number]  DATE: 2020  TIME: 1:48 PM    Assessment and Plan   Diarrhea of presumed infectious origin [R19 7]  1  Diarrhea of presumed infectious origin  Comprehensive metabolic panel    CBC and differential    C-reactive protein    Sedimentation rate, automated    Stool Enteric Bacterial Panel by PCR    Ova and parasite examination    Comprehensive metabolic panel    CBC and differential    C-reactive protein    Sedimentation rate, automated    Stool Enteric Bacterial Panel by PCR    Ova and parasite examination   2  Hyperlipidemia associated with type 2 diabetes mellitus (HCC)  atorvastatin (LIPITOR) 10 mg tablet       No problem-specific Assessment & Plan notes found for this encounter  Patient Instructions     Patient Instructions   Check stool samples on labs as ordered  Will need to wait for these results before any specific treatment  I would keep herself well hydrated  Avoid large meals or very fatty foods this may make the diarrhea worse  Chief Complaint     Chief Complaint   Patient presents with    Diarrhea         History of Present Illness       Patient comes in today with intermittent diarrhea over the last 2 months  There was an acute illness at that time lasting 3 days with rather severe diarrhea  No ongoing fevers  No blood has been noted in the bowel movement  Had been taking fish oils, thought he saw more of an or oily character to the diarrhea  He has since stopped these which has not cleared up  No proceeding antibiotic use  No dietary triggers  Review of Systems   Review of Systems   Constitutional: Negative for appetite change, chills, diaphoresis, fatigue and fever  Respiratory: Negative for cough, choking and shortness of breath  Cardiovascular: Negative for chest pain and palpitations     Gastrointestinal: Positive for abdominal distention and diarrhea  Negative for abdominal pain, anal bleeding, blood in stool, constipation, nausea, rectal pain and vomiting  Genitourinary: Negative for dysuria, flank pain, frequency and hematuria  Musculoskeletal: Positive for back pain  Negative for myalgias  Neurological: Negative for dizziness, light-headedness and headaches  Psychiatric/Behavioral: Negative for behavioral problems and dysphoric mood  The patient is not nervous/anxious  Current Medications       Current Outpatient Medications:     atorvastatin (LIPITOR) 10 mg tablet, Take 1 tablet (10 mg total) by mouth daily As directed, Disp: 90 tablet, Rfl: 1    hydrochlorothiazide (HYDRODIURIL) 25 mg tablet, TAKE 1 TABLET BY MOUTH DAILY, Disp: 90 tablet, Rfl: 1    metFORMIN (GLUCOPHAGE) 850 mg tablet, TAKE 1 TABLET TWICE A DAY, Disp: 100 tablet, Rfl: 0    Misc Natural Products (OSTEO BI-FLEX ADV TRIPLE ST) TABS, Take 1 tablet by mouth 2 (two) times a day, Disp: , Rfl:     omeprazole (PriLOSEC) 20 mg delayed release capsule, TAKE 1 CAPSULE DAILY, Disp: 90 capsule, Rfl: 1    quinapril (ACCUPRIL) 40 MG tablet, TAKE 1 TABLET BY MOUTH DAILY, Disp: 90 tablet, Rfl: 1    Current Allergies     Allergies as of 01/02/2020    (No Known Allergies)            The following portions of the patient's history were reviewed and updated as appropriate: allergies, current medications, past family history, past medical history, past social history, past surgical history and problem list      History reviewed  No pertinent past medical history  History reviewed  No pertinent surgical history  Family History   Problem Relation Age of Onset    Diabetes Father     Hyperlipidemia Father     Bipolar disorder Sister         1 disorder    Other Sister         hx of suicide    Lung cancer Mother     Substance Abuse Neg Hx          Medications have been verified          Objective   /88   Pulse (!) 106   Temp (!) 97 2 °F (36 2 °C) (Tympanic)   Ht 5' 8" (1 727 m)   Wt 118 kg (260 lb)   SpO2 98%   BMI 39 53 kg/m²        Physical Exam     Physical Exam   Constitutional: He is oriented to person, place, and time  He appears well-developed and well-nourished  No distress  HENT:   Head: Normocephalic and atraumatic  Mouth/Throat: Oropharynx is clear and moist    Neck: Neck supple  No thyromegaly present  Cardiovascular: Normal rate, regular rhythm and normal heart sounds  Pulmonary/Chest: Effort normal and breath sounds normal  No respiratory distress  He has no wheezes  Abdominal: Soft  Bowel sounds are normal  He exhibits no distension and no mass  There is no hepatosplenomegaly  There is generalized tenderness  There is no rebound, no guarding and no CVA tenderness  No hernia  Musculoskeletal:        Right shoulder: He exhibits no tenderness  Lymphadenopathy:     He has no cervical adenopathy  Neurological: He is alert and oriented to person, place, and time  Skin: Skin is warm and dry  He is not diaphoretic

## 2020-01-07 LAB
ADV 40+41 DNA STL QL NAA+NON-PROBE: NOT DETECTED
ALBUMIN SERPL-MCNC: 4.9 G/DL (ref 3.5–5.5)
ALBUMIN/GLOB SERPL: 1.8 {RATIO} (ref 1.2–2.2)
ALP SERPL-CCNC: 53 IU/L (ref 39–117)
ALT SERPL-CCNC: 110 IU/L (ref 0–44)
AST SERPL-CCNC: 51 IU/L (ref 0–40)
ASTRO TYP 1-8 RNA STL QL NAA+NON-PROBE: NOT DETECTED
BASOPHILS # BLD AUTO: 0 X10E3/UL (ref 0–0.2)
BASOPHILS NFR BLD AUTO: 1 %
BILIRUB SERPL-MCNC: 0.7 MG/DL (ref 0–1.2)
BUN SERPL-MCNC: 16 MG/DL (ref 6–24)
BUN/CREAT SERPL: 15 (ref 9–20)
C CAYETANENSIS DNA STL QL NAA+NON-PROBE: NOT DETECTED
C COLI+JEJ+UPSA DNA STL QL NAA+NON-PROBE: NOT DETECTED
C DIF TOX TCDA+TCDB STL QL NAA+NON-PROBE: NOT DETECTED
CALCIUM SERPL-MCNC: 10.1 MG/DL (ref 8.7–10.2)
CHLORIDE SERPL-SCNC: 96 MMOL/L (ref 96–106)
CO2 SERPL-SCNC: 22 MMOL/L (ref 20–29)
CREAT SERPL-MCNC: 1.04 MG/DL (ref 0.76–1.27)
CRP SERPL-MCNC: 3 MG/L (ref 0–10)
CRYPTOSP DNA STL QL NAA+NON-PROBE: NOT DETECTED
E COLI O157 DNA STL QL NAA+NON-PROBE: NORMAL
E HISTOLYT DNA STL QL NAA+NON-PROBE: NOT DETECTED
EAEC PAA PLAS AGGR+AATA ST NAA+NON-PRB: NOT DETECTED
EC STX1+STX2 GENES STL QL NAA+NON-PROBE: NOT DETECTED
EOSINOPHIL # BLD AUTO: 0.1 X10E3/UL (ref 0–0.4)
EOSINOPHIL NFR BLD AUTO: 2 %
EPEC EAE GENE STL QL NAA+NON-PROBE: NOT DETECTED
ERYTHROCYTE [DISTWIDTH] IN BLOOD BY AUTOMATED COUNT: 14.6 % (ref 12.3–15.4)
ERYTHROCYTE [SEDIMENTATION RATE] IN BLOOD BY WESTERGREN METHOD: 16 MM/HR (ref 0–30)
ETEC LTA+ST1A+ST1B TOX ST NAA+NON-PROBE: NOT DETECTED
G LAMBLIA DNA STL QL NAA+NON-PROBE: NOT DETECTED
GLOBULIN SER-MCNC: 2.7 G/DL (ref 1.5–4.5)
GLUCOSE SERPL-MCNC: 114 MG/DL (ref 65–99)
HCT VFR BLD AUTO: 45.8 % (ref 37.5–51)
HGB BLD-MCNC: 15.5 G/DL (ref 13–17.7)
IMM GRANULOCYTES # BLD: 0 X10E3/UL (ref 0–0.1)
IMM GRANULOCYTES NFR BLD: 0 %
LYMPHOCYTES # BLD AUTO: 2.3 X10E3/UL (ref 0.7–3.1)
LYMPHOCYTES NFR BLD AUTO: 37 %
Lab: NORMAL
MCH RBC QN AUTO: 28.4 PG (ref 26.6–33)
MCHC RBC AUTO-ENTMCNC: 33.8 G/DL (ref 31.5–35.7)
MCV RBC AUTO: 84 FL (ref 79–97)
MONOCYTES # BLD AUTO: 0.6 X10E3/UL (ref 0.1–0.9)
MONOCYTES NFR BLD AUTO: 10 %
NEUTROPHILS # BLD AUTO: 3.1 X10E3/UL (ref 1.4–7)
NEUTROPHILS NFR BLD AUTO: 50 %
NOROVIRUS GI+II RNA STL QL NAA+NON-PROBE: NOT DETECTED
O+P STL CONC: NORMAL
P SHIGELLOIDES DNA STL QL NAA+NON-PROBE: NOT DETECTED
PLATELET # BLD AUTO: 221 X10E3/UL (ref 150–450)
POTASSIUM SERPL-SCNC: 4.3 MMOL/L (ref 3.5–5.2)
PROT SERPL-MCNC: 7.6 G/DL (ref 6–8.5)
RBC # BLD AUTO: 5.45 X10E6/UL (ref 4.14–5.8)
RVA RNA STL QL NAA+NON-PROBE: NOT DETECTED
S ENT+BONG DNA STL QL NAA+NON-PROBE: NOT DETECTED
SAPO I+II+IV+V RNA STL QL NAA+NON-PROBE: NOT DETECTED
SHIGELLA SP+EIEC IPAH ST NAA+NON-PROBE: NOT DETECTED
SL AMB EGFR AFRICAN AMERICAN: 93 ML/MIN/1.73
SL AMB EGFR NON AFRICAN AMERICAN: 80 ML/MIN/1.73
SODIUM SERPL-SCNC: 134 MMOL/L (ref 134–144)
V CHOL+PARA+VUL DNA STL QL NAA+NON-PROBE: NOT DETECTED
V CHOLERAE DNA STL QL NAA+NON-PROBE: NOT DETECTED
WBC # BLD AUTO: 6.2 X10E3/UL (ref 3.4–10.8)
Y ENTEROCOL DNA STL QL NAA+NON-PROBE: NOT DETECTED

## 2020-01-07 NOTE — RESULT ENCOUNTER NOTE
Call patient with lab result-Stool studies are negative, elevated LFTs, I would advise Ultrasound of Gallbladder/liver- also ask how much alcohol he drinks, Check acute hepatitis panel, If all OK, then to GI for evaluation of possible malabsorption issue

## 2020-01-08 ENCOUNTER — TELEPHONE (OUTPATIENT)
Dept: FAMILY MEDICINE CLINIC | Facility: CLINIC | Age: 55
End: 2020-01-08

## 2020-01-08 DIAGNOSIS — R79.89 ELEVATED LFTS: Primary | ICD-10-CM

## 2020-01-08 NOTE — TELEPHONE ENCOUNTER
Pt aware of results  Would like to do Hepatitis panel before doing ultrasound  States last time he drank alcohol was Sept 2019

## 2020-01-08 NOTE — TELEPHONE ENCOUNTER
----- Message from Ivet Kirby MD sent at 1/7/2020  7:45 AM EST -----  Call patient with lab result-Stool studies are negative, elevated LFTs, I would advise Ultrasound of Gallbladder/liver- also ask how much alcohol he drinks, Check acute hepatitis panel, If all OK, then to GI for evaluation of possible malabsorption issue

## 2020-01-10 LAB
HAV IGM SERPL QL IA: NEGATIVE
HBV CORE IGM SERPL QL IA: NEGATIVE
HBV SURFACE AG SERPL QL IA: NEGATIVE
HCV AB S/CO SERPL IA: 0.2 S/CO RATIO (ref 0–0.9)
HCV AB S/CO SERPL IA: <0.1 S/CO RATIO (ref 0–0.9)

## 2020-01-11 DIAGNOSIS — E11.9 TYPE 2 DIABETES MELLITUS WITHOUT COMPLICATION, WITHOUT LONG-TERM CURRENT USE OF INSULIN (HCC): ICD-10-CM

## 2020-01-11 NOTE — TELEPHONE ENCOUNTER
Pt  Call requesting blood work result recently done please review and call pt with results Thank you

## 2020-01-13 ENCOUNTER — TELEPHONE (OUTPATIENT)
Dept: FAMILY MEDICINE CLINIC | Facility: CLINIC | Age: 55
End: 2020-01-13

## 2020-01-13 DIAGNOSIS — E11.9 TYPE 2 DIABETES MELLITUS WITHOUT COMPLICATION, WITHOUT LONG-TERM CURRENT USE OF INSULIN (HCC): Primary | ICD-10-CM

## 2020-01-13 NOTE — TELEPHONE ENCOUNTER
----- Message from Lizzie Lawton MD sent at 1/13/2020  9:03 AM EST -----  Call patient with lab result- hepatitis panel all negative  Get progress report how he is feeling  Would suggest repeating liver enzymes in 3-4 weeks if feeling well  Other option would be to see GI

## 2020-01-13 NOTE — RESULT ENCOUNTER NOTE
Call patient with lab result- hepatitis panel all negative  Get progress report how he is feeling  Would suggest repeating liver enzymes in 3-4 weeks if feeling well  Other option would be to see GI

## 2020-02-16 LAB
ALBUMIN SERPL-MCNC: 4.9 G/DL (ref 3.8–4.9)
ALBUMIN/GLOB SERPL: 2 {RATIO} (ref 1.2–2.2)
ALP SERPL-CCNC: 55 IU/L (ref 39–117)
ALT SERPL-CCNC: 44 IU/L (ref 0–44)
AST SERPL-CCNC: 25 IU/L (ref 0–40)
BILIRUB SERPL-MCNC: 0.3 MG/DL (ref 0–1.2)
BUN SERPL-MCNC: 30 MG/DL (ref 6–24)
BUN/CREAT SERPL: 29 (ref 9–20)
CALCIUM SERPL-MCNC: 10 MG/DL (ref 8.7–10.2)
CHLORIDE SERPL-SCNC: 98 MMOL/L (ref 96–106)
CHOLEST SERPL-MCNC: 150 MG/DL (ref 100–199)
CHOLEST/HDLC SERPL: 4.7 RATIO (ref 0–5)
CO2 SERPL-SCNC: 25 MMOL/L (ref 20–29)
CREAT SERPL-MCNC: 1.05 MG/DL (ref 0.76–1.27)
EST. AVERAGE GLUCOSE BLD GHB EST-MCNC: 148 MG/DL
GLOBULIN SER-MCNC: 2.5 G/DL (ref 1.5–4.5)
GLUCOSE SERPL-MCNC: 119 MG/DL (ref 65–99)
HBA1C MFR BLD: 6.8 % (ref 4.8–5.6)
HDLC SERPL-MCNC: 32 MG/DL
LDLC SERPL CALC-MCNC: 82 MG/DL (ref 0–99)
POTASSIUM SERPL-SCNC: 4.4 MMOL/L (ref 3.5–5.2)
PROT SERPL-MCNC: 7.4 G/DL (ref 6–8.5)
SL AMB EGFR AFRICAN AMERICAN: 92 ML/MIN/1.73
SL AMB EGFR NON AFRICAN AMERICAN: 80 ML/MIN/1.73
SL AMB VLDL CHOLESTEROL CALC: 36 MG/DL (ref 5–40)
SODIUM SERPL-SCNC: 139 MMOL/L (ref 134–144)
TRIGL SERPL-MCNC: 181 MG/DL (ref 0–149)

## 2020-02-17 DIAGNOSIS — K21.00 PEPTIC REFLUX ESOPHAGITIS: ICD-10-CM

## 2020-02-17 DIAGNOSIS — I10 HYPERTENSION, UNSPECIFIED TYPE: ICD-10-CM

## 2020-02-17 DIAGNOSIS — I10 ESSENTIAL HYPERTENSION: ICD-10-CM

## 2020-02-17 RX ORDER — OMEPRAZOLE 20 MG/1
CAPSULE, DELAYED RELEASE ORAL
Qty: 90 CAPSULE | Refills: 0 | Status: SHIPPED | OUTPATIENT
Start: 2020-02-17 | End: 2020-05-11

## 2020-02-17 RX ORDER — QUINAPRIL 40 MG/1
TABLET ORAL
Qty: 90 TABLET | Refills: 0 | Status: SHIPPED | OUTPATIENT
Start: 2020-02-17 | End: 2020-04-02

## 2020-02-17 RX ORDER — HYDROCHLOROTHIAZIDE 25 MG/1
TABLET ORAL
Qty: 90 TABLET | Refills: 0 | Status: SHIPPED | OUTPATIENT
Start: 2020-02-17 | End: 2020-05-11

## 2020-02-20 ENCOUNTER — OFFICE VISIT (OUTPATIENT)
Dept: FAMILY MEDICINE CLINIC | Facility: CLINIC | Age: 55
End: 2020-02-20
Payer: COMMERCIAL

## 2020-02-20 VITALS
BODY MASS INDEX: 39.15 KG/M2 | DIASTOLIC BLOOD PRESSURE: 86 MMHG | RESPIRATION RATE: 20 BRPM | HEART RATE: 86 BPM | SYSTOLIC BLOOD PRESSURE: 136 MMHG | TEMPERATURE: 96.7 F | HEIGHT: 67 IN | WEIGHT: 249.4 LBS | OXYGEN SATURATION: 96 %

## 2020-02-20 DIAGNOSIS — E78.5 HYPERLIPIDEMIA ASSOCIATED WITH TYPE 2 DIABETES MELLITUS (HCC): ICD-10-CM

## 2020-02-20 DIAGNOSIS — E11.69 HYPERLIPIDEMIA ASSOCIATED WITH TYPE 2 DIABETES MELLITUS (HCC): ICD-10-CM

## 2020-02-20 DIAGNOSIS — Z12.11 COLON CANCER SCREENING: Primary | ICD-10-CM

## 2020-02-20 DIAGNOSIS — K21.00 PEPTIC REFLUX ESOPHAGITIS: ICD-10-CM

## 2020-02-20 DIAGNOSIS — E11.9 TYPE 2 DIABETES MELLITUS WITHOUT COMPLICATION, WITHOUT LONG-TERM CURRENT USE OF INSULIN (HCC): ICD-10-CM

## 2020-02-20 DIAGNOSIS — Z00.00 ANNUAL PHYSICAL EXAM: Primary | ICD-10-CM

## 2020-02-20 DIAGNOSIS — I10 BENIGN ESSENTIAL HYPERTENSION: ICD-10-CM

## 2020-02-20 PROCEDURE — 1036F TOBACCO NON-USER: CPT | Performed by: FAMILY MEDICINE

## 2020-02-20 PROCEDURE — 99396 PREV VISIT EST AGE 40-64: CPT | Performed by: FAMILY MEDICINE

## 2020-02-20 PROCEDURE — 3008F BODY MASS INDEX DOCD: CPT | Performed by: FAMILY MEDICINE

## 2020-02-20 PROCEDURE — 99214 OFFICE O/P EST MOD 30 MIN: CPT | Performed by: FAMILY MEDICINE

## 2020-02-20 PROCEDURE — 2022F DILAT RTA XM EVC RTNOPTHY: CPT | Performed by: FAMILY MEDICINE

## 2020-02-20 PROCEDURE — 3075F SYST BP GE 130 - 139MM HG: CPT | Performed by: FAMILY MEDICINE

## 2020-02-20 PROCEDURE — 3079F DIAST BP 80-89 MM HG: CPT | Performed by: FAMILY MEDICINE

## 2020-02-20 NOTE — PATIENT INSTRUCTIONS
Medical management continue diet, exercise and weight loss  Continue current medications for diabetes and high blood pressure  Continue to monitor blood pressure  Trial of switching to famotidine 40 mg total per day  Then taper symptoms are controlled  Get diabetic eye exam 1 advised  Wellness Visit for Adults   AMBULATORY CARE:   A wellness visit  is when you see your healthcare provider to get screened for health problems  You can also get advice on how to stay healthy  Write down your questions so you remember to ask them  Ask your healthcare provider how often you should have a wellness visit  What happens at a wellness visit:  Your healthcare provider will ask about your health, and your family history of health problems  This includes high blood pressure, heart disease, and cancer  He or she will ask if you have symptoms that concern you, if you smoke, and about your mood  You may also be asked about your intake of medicines, supplements, food, and alcohol  Any of the following may be done:  · Your weight  will be checked  Your height may also be checked so your body mass index (BMI) can be calculated  Your BMI shows if you are at a healthy weight  · Your blood pressure  and heart rate will be checked  Your temperature may also be checked  · Blood and urine tests  may be done  Blood tests may be done to check your cholesterol levels  Abnormal cholesterol levels increase your risk for heart disease and stroke  You may also need a blood or urine test to check for diabetes if you are at increased risk  Urine tests may be done to look for signs of an infection or kidney disease  · A physical exam  includes checking your heartbeat and lungs with a stethoscope  Your healthcare provider may also check your skin to look for sun damage  · Screening tests  may be recommended  A screening test is done to check for diseases that may not cause symptoms   The screening tests you may need depend on your age, gender, family history, and lifestyle habits  For example, colorectal screening may be recommended if you are 48years old or older  Screening tests you need if you are a woman:   · A Pap smear  is used to screen for cervical cancer  Pap smears are usually done every 3 to 5 years depending on your age  You may need them more often if you have had abnormal Pap smear test results in the past  Ask your healthcare provider how often you should have a Pap smear  · A mammogram  is an x-ray of your breasts to screen for breast cancer  Experts recommend mammograms every 2 years starting at age 48 years  You may need a mammogram at age 52 years or younger if you have an increased risk for breast cancer  Talk to your healthcare provider about when you should start having mammograms and how often you need them  Vaccines you may need:   · Get an influenza vaccine  every year  The influenza vaccine protects you from the flu  Several types of viruses cause the flu  The viruses change over time, so new vaccines are made each year  · Get a tetanus-diphtheria (Td) booster vaccine  every 10 years  This vaccine protects you against tetanus and diphtheria  Tetanus is a severe infection that may cause painful muscle spasms and lockjaw  Diphtheria is a severe bacterial infection that causes a thick covering in the back of your mouth and throat  · Get a human papillomavirus (HPV) vaccine  if you are female and aged 23 to 32 or male 23 to 24 and never received it  This vaccine protects you from HPV infection  HPV is the most common infection spread by sexual contact  HPV may also cause vaginal, penile, and anal cancers  · Get a pneumococcal vaccine  if you are aged 72 years or older  The pneumococcal vaccine is an injection given to protect you from pneumococcal disease  Pneumococcal disease is an infection caused by pneumococcal bacteria   The infection may cause pneumonia, meningitis, or an ear infection  · Get a shingles vaccine  if you are aged 61 or older, even if you have had shingles before  The shingles vaccine is an injection to protect you from the varicella-zoster virus  This is the same virus that causes chickenpox  Shingles is a painful rash that develops in people who had chickenpox or have been exposed to the virus  How to eat healthy:  My Plate is a model for planning healthy meals  It shows the types and amounts of foods that should go on your plate  Fruits and vegetables make up about half of your plate, and grains and protein make up the other half  A serving of dairy is included on the side of your plate  The amount of calories and serving sizes you need depends on your age, gender, weight, and height  Examples of healthy foods are listed below:  · Eat a variety of vegetables  such as dark green, red, and orange vegetables  You can also include canned vegetables low in sodium (salt) and frozen vegetables without added butter or sauces  · Eat a variety of fresh fruits , canned fruit in 100% juice, frozen fruit, and dried fruit  · Include whole grains  At least half of the grains you eat should be whole grains  Examples include whole-wheat bread, wheat pasta, brown rice, and whole-grain cereals such as oatmeal     · Eat a variety of protein foods such as seafood (fish and shellfish), lean meat, and poultry without skin (turkey and chicken)  Examples of lean meats include pork leg, shoulder, or tenderloin, and beef round, sirloin, tenderloin, and extra lean ground beef  Other protein foods include eggs and egg substitutes, beans, peas, soy products, nuts, and seeds  · Choose low-fat dairy products such as skim or 1% milk or low-fat yogurt, cheese, and cottage cheese  · Limit unhealthy fats  such as butter, hard margarine, and shortening  Exercise:  Exercise at least 30 minutes per day on most days of the week   Some examples of exercise include walking, biking, dancing, and swimming  You can also fit in more physical activity by taking the stairs instead of the elevator or parking farther away from stores  Include muscle strengthening activities 2 days each week  Regular exercise provides many health benefits  It helps you manage your weight, and decreases your risk for type 2 diabetes, heart disease, stroke, and high blood pressure  Exercise can also help improve your mood  Ask your healthcare provider about the best exercise plan for you  General health and safety guidelines:   · Do not smoke  Nicotine and other chemicals in cigarettes and cigars can cause lung damage  Ask your healthcare provider for information if you currently smoke and need help to quit  E-cigarettes or smokeless tobacco still contain nicotine  Talk to your healthcare provider before you use these products  · Limit alcohol  A drink of alcohol is 12 ounces of beer, 5 ounces of wine, or 1½ ounces of liquor  · Lose weight, if needed  Being overweight increases your risk of certain health conditions  These include heart disease, high blood pressure, type 2 diabetes, and certain types of cancer  · Protect your skin  Do not sunbathe or use tanning beds  Use sunscreen with a SPF 15 or higher  Apply sunscreen at least 15 minutes before you go outside  Reapply sunscreen every 2 hours  Wear protective clothing, hats, and sunglasses when you are outside  · Drive safely  Always wear your seatbelt  Make sure everyone in your car wears a seatbelt  A seatbelt can save your life if you are in an accident  Do not use your cell phone when you are driving  This could distract you and cause an accident  Pull over if you need to make a call or send a text message  · Practice safe sex  Use latex condoms if are sexually active and have more than one partner  Your healthcare provider may recommend screening tests for sexually transmitted infections (STIs)      · Wear helmets, lifejackets, and protective gear  Always wear a helmet when you ride a bike or motorcycle, go skiing, or play sports that could cause a head injury  Wear protective equipment when you play sports  Wear a lifejacket when you are on a boat or doing water sports  © 2017 [employer] Information is for End User's use only and may not be sold, redistributed or otherwise used for commercial purposes  All illustrations and images included in CareNotes® are the copyrighted property of [employer]  or Terence Elder  The above information is an  only  It is not intended as medical advice for individual conditions or treatments  Talk to your doctor, nurse or pharmacist before following any medical regimen to see if it is safe and effective for you  Weight Management   AMBULATORY CARE:   Why it is important to manage your weight:  Being overweight increases your risk of health conditions such as heart disease, high blood pressure, type 2 diabetes, and certain types of cancer  It can also increase your risk for osteoarthritis, sleep apnea, and other respiratory problems  Aim for a slow, steady weight loss  Even a small amount of weight loss can lower your risk of health problems  How to lose weight safely:  A safe and healthy way to lose weight is to eat fewer calories and get regular exercise  You can lose up about 1 pound a week by decreasing the number of calories you eat by 500 calories each day  You can decrease calories by eating smaller portion sizes or by cutting out high-calorie foods  Read labels to find out how many calories are in the foods you eat  You can also burn calories with exercise such as walking, swimming, or biking  You will be more likely to keep weight off if you make these changes part of your lifestyle  Healthy meal plan for weight management:  A healthy meal plan includes a variety of foods, contains fewer calories, and helps you stay healthy   A healthy meal plan includes the following:  · Eat whole-grain foods more often  A healthy meal plan should contain fiber  Fiber is the part of grains, fruits, and vegetables that is not broken down by your body  Whole-grain foods are healthy and provide extra fiber in your diet  Some examples of whole-grain foods are whole-wheat breads and pastas, oatmeal, brown rice, and bulgur  · Eat a variety of vegetables every day  Include dark, leafy greens such as spinach, kale, artis greens, and mustard greens  Eat yellow and orange vegetables such as carrots, sweet potatoes, and winter squash  · Eat a variety of fruits every day  Choose fresh or canned fruit (canned in its own juice or light syrup) instead of juice  Fruit juice has very little or no fiber  · Eat low-fat dairy foods  Drink fat-free (skim) milk or 1% milk  Eat fat-free yogurt and low-fat cottage cheese  Try low-fat cheeses such as mozzarella and other reduced-fat cheeses  · Choose meat and other protein foods that are low in fat  Choose beans or other legumes such as split peas or lentils  Choose fish, skinless poultry (chicken or turkey), or lean cuts of red meat (beef or pork)  Before you cook meat or poultry, cut off any visible fat  · Use less fat and oil  Try baking foods instead of frying them  Add less fat, such as margarine, sour cream, regular salad dressing and mayonnaise to foods  Eat fewer high-fat foods  Some examples of high-fat foods include french fries, doughnuts, ice cream, and cakes  · Eat fewer sweets  Limit foods and drinks that are high in sugar  This includes candy, cookies, regular soda, and sweetened drinks  Ways to decrease calories:   · Eat smaller portions  ¨ Use a small plate with smaller servings  ¨ Do not eat second helpings  ¨ When you eat at a restaurant, ask for a box and place half of your meal in the box before you eat  ¨ Share an entrée with someone else      · Replace high-calorie snacks with healthy, low-calorie snacks  ¨ Choose fresh fruit, vegetables, fat-free rice cakes, or air-popped popcorn instead of potato chips, nuts, or chocolate  ¨ Choose water or calorie-free drinks instead of soda or sweetened drinks  · Eat regular meals  Skipping meals can lead to overeating later in the day  Eat a healthy snack in place of a meal if you do not have time to eat a regular meal      · Do not shop for groceries when you are hungry  You may be more likely to make unhealthy food choices  Take a grocery list of healthy foods and shop after you have eaten  Exercise:  Exercise at least 30 minutes per day on most days of the week  Some examples of exercise include walking, biking, dancing, and swimming  You can also fit in more physical activity by taking the stairs instead of the elevator or parking farther away from stores  Ask your healthcare provider about the best exercise plan for you  Other things to consider as you try to lose weight:   · Be aware of situations that may give you the urge to overeat, such as eating while watching television  Find ways to avoid these situations  For example, read a book, go for a walk, or do crafts  · Meet with a weight loss support group or friends who are also trying to lose weight  This may help you stay motivated to continue working on your weight loss goals  © 2017 [employer] Information is for End User's use only and may not be sold, redistributed or otherwise used for commercial purposes  All illustrations and images included in CareNotes® are the copyrighted property of [employer]  or Terence Elder  The above information is an  only  It is not intended as medical advice for individual conditions or treatments  Talk to your doctor, nurse or pharmacist before following any medical regimen to see if it is safe and effective for you      Cholesterol and Your Health   AMBULATORY CARE:   Cholesterol  is a waxy, fat-like substance  Cholesterol is made by your body, but also comes from certain foods you eat  Your body uses cholesterol to make hormones and new cells  Your body also uses cholesterol to protect nerves  Cholesterol comes from foods such as meat and dairy products  Your total cholesterol level is made up by LDL cholesterol, HDL cholesterol, and triglycerides:  · LDL cholesterol  is called bad cholesterol  because it forms plaque in your arteries  As plaque builds up, your arteries become narrow, and less blood flows through  When plaque decreases blood flow to your heart, you may have chest pain  If plaque completely blocks an artery that bring blood to your heart, you may have a heart attack  Plaque can break off and form blood clots  Blood clots may block arteries in your brain and cause a stroke  · HDL cholesterol  is called good cholesterol  because it helps remove LDL cholesterol from your arteries  It does this by attaching to LDL cholesterol and carrying it to your liver  Your liver breaks down LDL cholesterol so your body can get rid of it  High levels of HDL cholesterol can help prevent a heart attack and stroke  Low levels of HDL cholesterol can increase your risk for heart disease, heart attack, and stroke  · Triglycerides  are a type of fat that store energy from foods you eat  High levels of triglycerides also cause plaque buildup  This can increase your risk for a heart attack or stroke  If your triglyceride level is high, your LDL cholesterol level may also be high  How food affects your cholesterol levels:   · Unhealthy fats  increase LDL cholesterol and triglyceride levels in your blood  They are found in foods high in cholesterol, saturated fat, and trans fat:     ¨ Cholesterol  is found in eggs, dairy, and meat  ¨ Saturated fat  is found in butter, cheese, ice cream, whole milk, and coconut oil   Saturated fat is also found in meat, such as sausage, hot dogs, and darshan Pepper Trans fat  is found in liquid oils and is used in fried and baked foods  Foods that contain trans fats include chips, crackers, muffins, sweet rolls, microwave popcorn, and cookies  · Healthy fats,  also called unsaturated fats, help lower LDL cholesterol and triglyceride levels  Healthy fats include the following:     ¨ Monounsaturated fats  are found in foods such as olive oil, canola oil, avocado, nuts, and olives  ¨ Polyunsaturated fats,  such as omega 3 fats, are found in fish, such as salmon, trout, and tuna  They can also be found in plant foods such as flaxseed, walnuts, and soybeans  Other things that affect your cholesterol levels:   · Smoking cigarettes    · Being overweight or obese     · Drinking large amounts of alcohol    · Not enough exercise or no exercise    · Certain genes passed from your parents to you  What you need to know about having your cholesterol levels checked: Adults 21to 39years of age should have their cholesterol levels checked every 4 to 6 years  Adults 45 years and older should have their cholesterol checked every 1 to 2 years  You may need your cholesterol checked more often, or at a younger age, if you have risk factors for heart disease  You may also need to have your cholesterol checked more often if you have other health conditions, such as diabetes  Blood tests are used to check cholesterol levels  Blood tests measure your levels of triglycerides, LDL cholesterol, and HDL cholesterol  Cholesterol level goals: Your cholesterol level goal may depend on your risk for heart disease  It may also depend on your age and other health conditions  Ask your healthcare provider if the following goals are right for you:  · Your total cholesterol level  should be less than 200 mg/dL  This number may also depend on your HDL and LDL cholesterol goals  · Your LDL cholesterol level  should be less than 130 mg/dL       · Your HDL cholesterol level  should be 60 mg/dL or higher  · Your triglyceride level  should be less than 150 mg/dL  Treatment for high cholesterol:  Treatment for high cholesterol will also decrease your risk of heart disease, heart attack, and stroke  Treatment may include any of the following:  · Medicines  may be given to lower your LDL cholesterol, triglyceride levels, or total cholesterol level  You may need medicines to lower your cholesterol if any of the following is true:     ¨ You have a history of stroke, TIA, unstable angina, or a heart attack    ¨ Your LDL cholesterol level is 190 mg/dL or higher    ¨ You are age 36to 76years of age, have diabetes, and your LDL cholesterol is 70 mg/dL or higher    ¨ You are age 36to 76years of age, have risk factors for heart disease, and your LDL cholesterol is 70 mg/dL or higher    · Lifestyle changes  include changes to your diet, exercise, weight loss, and quitting smoking  It also includes decreasing the amount of alcohol you drink  · Supplements  include fish oil, red yeast rice, and garlic  Fish oil may help lower your triglyceride and LDL cholesterol levels  It may also increase your HDL cholesterol level  Red yeast rice may help decrease your total cholesterol level and LDL cholesterol level  Garlic may help lower your total cholesterol level  Do not take these supplements without talking to your healthcare provider  Nutrition to help lower your cholesterol levels:  A registered dietitian can help you create a healthy eating plan  Read food labels and choose foods low in saturated fat, trans fats, and cholesterol  · Decrease the total amount of fat you eat  Choose lean meats, fat-free or 1% fat milk, and low-fat dairy products, such as yogurt and cheese  Try to limit or avoid red meats  Limit or do not eat fried foods or baked goods such as cookies  · Replace unhealthy fats with healthy fats  Cook foods in olive oil or canola oil   Choose soft margarines that are low in saturated fat and trans fat  Seeds, nuts, and avocados are other examples of healthy fats  · Eat foods with omega-3 fats  Examples include salmon, tuna, mackerel, walnuts, and flaxseed  Eat fish 2 times per week  Children and pregnant women should not eat fish that have high levels of mercury, such as shark, swordfish, and zaire mackerel  · Increase the amount of plant-based foods you eat  Plant-based foods are low in cholesterol and fat  Eating more of these foods may help lower your cholesterol and help you lose weight  Examples of plant-based foods includes fruits, vegetables, legumes, and whole grains  Replace milk that contains dairy with almond, soy, or coconut milk  Eat beans and foods with soy for protein instead of meat  Ask your healthcare provider or dietitian for more information on plant-based foods  · Increase the amount of fiber you eat  High-fiber foods can help lower your LDL cholesterol  You should eat between 20 and 30 grams of fiber each day  Eat at least 5 servings of fruits and vegetables each day  Other examples of high-fiber foods include whole-grain or whole-wheat breads, pastas, or cereals, and brown rice  Eat 3 ounces of whole-grain foods each day  Increase fiber slowly  You may have abdominal discomfort, bloating, and gas if you add fiber to your diet too quickly  Lifestyle changes you can make to help lower your cholesterol levels:   · Maintain a healthy weight  Ask your healthcare provider how much you should weigh  Ask him or her to help you create a weight loss plan if you are overweight  Weight loss can decrease your total cholesterol and triglyceride levels  · Exercise regularly  Exercise can help lower your total cholesterol level and maintain a healthy weight  Exercise can also help increase your HDL cholesterol level  Work with your healthcare provider to create an exercise program that is right for you   Get at least 30 minutes of moderate exercise most days of the week  Examples of exercise include brisk walking, swimming, or biking  · Do not smoke  Nicotine and other chemicals in cigarettes and cigars can damage your lungs, heart, and blood vessels  They can also raise your triglyceride levels  Ask your healthcare provider for information if you currently smoke and need help to quit  E-cigarettes or smokeless tobacco still contain nicotine  Talk to your healthcare provider before you use these products  · Limit or do not drink alcohol  Alcohol can increase your triglyceride levels  Ask your healthcare provider if it is safe for you to drink alcohol  Also ask how much is safe for you to drink each day  © 2017 [facility] [facility] Information is for End User's use only and may not be sold, redistributed or otherwise used for commercial purposes  All illustrations and images included in CareNotes® are the copyrighted property of [employer]  or Terence Elder  The above information is an  only  It is not intended as medical advice for individual conditions or treatments  Talk to your doctor, nurse or pharmacist before following any medical regimen to see if it is safe and effective for you

## 2020-02-20 NOTE — PROGRESS NOTES
Subjective:   Chief Complaint   Patient presents with    Annual Exam     patient presents for annual physical    Follow-up     to chronic conditions-medication check- blood work done 02/15/20        Patient ID: Tiago Rice is a 53 y o  male  Medical management multiple issues-  1) diabetes-A1c at 6 8%  Patient has been able to lose 10 lb   2) hypertension-slightly elevated in the office today, however patient states does monitor home usually less than 130/80  3) hyperlipidemia-good ranges  4) GERD-patient stable on omeprazole  No dysphagia  Would be interested in trying to get off this  The following portions of the patient's history were reviewed and updated as appropriate: allergies, current medications, past family history, past medical history, past social history, past surgical history and problem list     Review of Systems   Constitutional: Negative for appetite change, chills, diaphoresis and fever  HENT: Negative for ear pain, rhinorrhea, sinus pressure and sore throat  Eyes: Negative for discharge, redness and itching  Respiratory: Negative for cough, shortness of breath and wheezing  Cardiovascular: Negative for chest pain and palpitations  Rapid or slow heart rate   Gastrointestinal: Negative for abdominal pain, diarrhea, nausea and vomiting  Genitourinary: Negative for difficulty urinating, dysuria, frequency and hematuria  Neurological: Negative for dizziness, light-headedness and headaches  Objective:  Vitals:    02/20/20 0711 02/20/20 0746   BP: 140/92 136/86   BP Location: Right arm    Patient Position: Sitting    Cuff Size: Large    Pulse: 86    Resp: 20    Temp: (!) 96 7 °F (35 9 °C)    TempSrc: Tympanic    SpO2: 96%    Weight: 113 kg (249 lb 6 4 oz)    Height: 5' 7 32" (1 71 m)       Physical Exam   Constitutional: He is oriented to person, place, and time  He appears well-developed and well-nourished  No distress     HENT:   Head: Normocephalic and atraumatic  Right Ear: Tympanic membrane and external ear normal  No drainage  Left Ear: Tympanic membrane normal  No drainage  Mouth/Throat: No oropharyngeal exudate  Eyes: Conjunctivae and EOM are normal  Right eye exhibits no discharge  Left eye exhibits no discharge  Neck: Normal range of motion  Neck supple  No thyromegaly present  Cardiovascular: Normal rate, regular rhythm and normal heart sounds  Pulses are no weak pulses  Pulses:       Dorsalis pedis pulses are 2+ on the right side, and 2+ on the left side  Posterior tibial pulses are 2+ on the right side, and 2+ on the left side  Pulmonary/Chest: Effort normal  No respiratory distress  He has no wheezes  He has no rales  Feet:   Right Foot:   Skin Integrity: Negative for ulcer, skin breakdown, erythema, warmth, callus or dry skin  Left Foot:   Skin Integrity: Negative for ulcer, skin breakdown, erythema, warmth, callus or dry skin  Lymphadenopathy:     He has no cervical adenopathy  Neurological: He is alert and oriented to person, place, and time  Psychiatric: He has a normal mood and affect  Patient's shoes and socks removed  Right Foot/Ankle   Right Foot Inspection  Skin Exam: skin normal and skin intact no dry skin, no warmth, no callus, no erythema, no maceration, no abnormal color, no pre-ulcer, no ulcer and no callus                            Sensory   Vibration: intact    Monofilament testing: intact  Vascular  Capillary refills: < 3 seconds  The right DP pulse is 2+  The right PT pulse is 2+  Left Foot/Ankle  Left Foot Inspection  Skin Exam: skin normal and skin intactno dry skin, no warmth, no erythema, no maceration, normal color, no pre-ulcer, no ulcer and no callus                                         Sensory   Vibration: intact    Monofilament: intact  Vascular  Capillary refills: < 3 seconds  The left DP pulse is 2+  The left PT pulse is 2+     Assign Risk Category:  No deformity present; No loss of protective sensation; No weak pulses       Risk: 0      Assessment/Plan:    No problem-specific Assessment & Plan notes found for this encounter  Diagnoses and all orders for this visit:    Annual physical exam    Hyperlipidemia associated with type 2 diabetes mellitus ([address] )    Benign essential hypertension    Type 2 diabetes mellitus without complication, without long-term current use of insulin ([facility])    Peptic reflux esophagitis          Medical management continue diet, exercise and weight loss  Continue current medications for diabetes and high blood pressure  Continue to monitor blood pressure  Trial of switching to famotidine 40 mg total per day  Then taper symptoms are controlled  Get diabetic eye exam 1 advised

## 2020-02-20 NOTE — PROGRESS NOTES
ADULT ANNUAL [facility]    NAME: Jacqui Carson  AGE: 53 y o  SEX: male  : 1965     DATE: 2020     Assessment and Plan:     Problem List Items Addressed This Visit     None          Immunizations and preventive care screenings were discussed with patient today  Appropriate education was printed on patient's after visit summary  Counseling:  Dental Health: discussed importance of regular tooth brushing, flossing, and dental visits  Injury prevention: discussed safety/seat belts, safety helmets, smoke detectors, carbon dioxide detectors, and smoking near bedding or upholstery  · Exercise: the importance of regular exercise/physical activity was discussed  Recommend exercise 3-5 times per week for at least 30 minutes  BMI Counseling: Body mass index is 38 69 kg/m²  The BMI is above normal  Nutrition recommendations include decreasing portion sizes, encouraging healthy choices of fruits and vegetables, moderation in carbohydrate intake and reducing intake of cholesterol  Exercise recommendations include exercising 3-5 times per week  No pharmacotherapy was ordered  Patient referred to PCP due to patient being overweight  No follow-ups on file  Chief Complaint:     Chief Complaint   Patient presents with    Annual Exam     patient presents for annual physical    Follow-up     to chronic conditions-medication check- blood work done 02/15/20      History of Present Illness:     Adult Annual Physical   Patient here for a comprehensive physical exam  The patient reports See problem list     Diet and Physical Activity  · Diet/Nutrition: well balanced diet, limited junk food and consuming 3-5 servings of fruits/vegetables daily  · Exercise: walking and 3-4 times a week on average        Depression Screening  PHQ-9 Depression Screening    PHQ-9:    Frequency of the following problems over the past two weeks: General Health  · Sleep: sleeps well and gets 7-8 hours of sleep on average  · Hearing: normal - bilateral   · Vision: wears glasses and dry eyes  · Dental: regular dental visits and brushes teeth twice daily   Health  · Symptoms include: none     Review of Systems:     Review of Systems   Constitutional: Negative for activity change, appetite change, diaphoresis and fatigue  Respiratory: Negative for cough, chest tightness, shortness of breath and wheezing  Cardiovascular: Negative for chest pain, palpitations and leg swelling  Fast or slow heart rate   Gastrointestinal: Negative for abdominal pain, blood in stool, constipation, diarrhea, nausea and vomiting  Genitourinary: Negative for difficulty urinating, dysuria and frequency  Musculoskeletal: Negative for arthralgias, gait problem, joint swelling and myalgias  Neurological: Negative for dizziness, light-headedness and headaches  Psychiatric/Behavioral: Negative for agitation, confusion, dysphoric mood and sleep disturbance  The patient is not nervous/anxious  Past Medical History:     History reviewed  No pertinent past medical history     Past Surgical History:     Past Surgical History:   Procedure Laterality Date    NO PAST SURGERIES        Family History:     Family History   Problem Relation Age of Onset    Diabetes Father     Hyperlipidemia Father     Bipolar disorder Sister         1 disorder    Other Sister         hx of suicide    Lung cancer Mother     Substance Abuse Neg Hx       Social History:        Social History     Socioeconomic History    Marital status: Single     Spouse name: None    Number of children: None    Years of education: None    Highest education level: None   Occupational History     Comment: full time   Social Needs    Financial resource strain: None    Food insecurity:     Worry: None     Inability: None    Transportation needs:     Medical: None     Non-medical: None Tobacco Use    Smoking status: Former Smoker     Types: Cigarettes     Last attempt to quit:      Years since quittin 1    Smokeless tobacco: Never Used   Substance and Sexual Activity    Alcohol use: Yes     Frequency: 2-4 times a month     Drinks per session: 3 or 4     Binge frequency: Never    Drug use: No    Sexual activity: Yes     Partners: Female   Lifestyle    Physical activity:     Days per week: None     Minutes per session: None    Stress: None   Relationships    Social connections:     Talks on phone: None     Gets together: None     Attends Confucianism service: None     Active member of club or organization: None     Attends meetings of clubs or organizations: None     Relationship status: None    Intimate partner violence:     Fear of current or ex partner: None     Emotionally abused: None     Physically abused: None     Forced sexual activity: None   Other Topics Concern    None   Social History Narrative    Lives alone without help available      Current Medications:     Current Outpatient Medications   Medication Sig Dispense Refill    atorvastatin (LIPITOR) 10 mg tablet Take 1 tablet (10 mg total) by mouth daily As directed 90 tablet 1    hydrochlorothiazide (HYDRODIURIL) 25 mg tablet TAKE 1 TABLET BY MOUTH DAILY 90 tablet 0    metFORMIN (GLUCOPHAGE) 850 mg tablet TAKE 1 TABLET TWICE A  tablet 0    Misc Natural Products (OSTEO BI-FLEX ADV TRIPLE ST) TABS Take 1 tablet by mouth 2 (two) times a day      omeprazole (PriLOSEC) 20 mg delayed release capsule TAKE 1 CAPSULE DAILY 90 capsule 0    quinapril (ACCUPRIL) 40 MG tablet TAKE 1 TABLET BY MOUTH DAILY 90 tablet 0     No current facility-administered medications for this visit         Allergies:     No Known Allergies   Physical Exam:     /92 (BP Location: Right arm, Patient Position: Sitting, Cuff Size: Large)   Pulse 86   Temp (!) 96 7 °F (35 9 °C) (Tympanic)   Resp 20   Ht 5' 7 32" (1 71 m)   Wt 113 kg (249 lb 6 4 oz)   SpO2 96%   BMI 38 69 kg/m²     Physical Exam   Constitutional: He is oriented to person, place, and time  He appears well-developed and well-nourished  No distress  HENT:   Head: Normocephalic and atraumatic  Right Ear: Tympanic membrane, external ear and ear canal normal    Left Ear: Tympanic membrane, external ear and ear canal normal    Nose: No mucosal edema or rhinorrhea  Right sinus exhibits no maxillary sinus tenderness  Left sinus exhibits no maxillary sinus tenderness  Mouth/Throat: Uvula is midline  Mucous membranes are not pale and not dry  Normal dentition  No oropharyngeal exudate  Eyes: Pupils are equal, round, and reactive to light  EOM are normal  Right eye exhibits no discharge  Left eye exhibits no discharge  Neck: Normal range of motion  Neck supple  No thyromegaly present  Cardiovascular: Normal rate, regular rhythm, normal heart sounds and intact distal pulses  No murmur heard  Pulmonary/Chest: Effort normal and breath sounds normal  No respiratory distress  He has no wheezes  He has no rales  Musculoskeletal: Normal range of motion  He exhibits no edema or tenderness  Lymphadenopathy:     He has no cervical adenopathy  Neurological: He is alert and oriented to person, place, and time  No cranial nerve deficit  Skin: He is not diaphoretic  Psychiatric: He has a normal mood and affect   His behavior is normal        Gt Esparza MD  [id number]

## 2020-02-25 DIAGNOSIS — E11.9 TYPE 2 DIABETES MELLITUS WITHOUT COMPLICATION, WITHOUT LONG-TERM CURRENT USE OF INSULIN (HCC): ICD-10-CM

## 2020-03-04 ENCOUNTER — TELEPHONE (OUTPATIENT)
Dept: FAMILY MEDICINE CLINIC | Facility: CLINIC | Age: 55
End: 2020-03-04

## 2020-03-04 NOTE — TELEPHONE ENCOUNTER
----- Message from Michael Herrera MD sent at 3/4/2020 10:16 AM EST -----  Call patient with lab result-Cologuard negative, repeat 3 years

## 2020-04-02 DIAGNOSIS — I10 HYPERTENSION, UNSPECIFIED TYPE: ICD-10-CM

## 2020-04-02 PROCEDURE — 4010F ACE/ARB THERAPY RXD/TAKEN: CPT | Performed by: FAMILY MEDICINE

## 2020-04-02 RX ORDER — QUINAPRIL 40 MG/1
TABLET ORAL
Qty: 90 TABLET | Refills: 1 | Status: SHIPPED | OUTPATIENT
Start: 2020-04-02 | End: 2020-10-14

## 2020-05-11 DIAGNOSIS — I10 ESSENTIAL HYPERTENSION: ICD-10-CM

## 2020-05-11 DIAGNOSIS — K21.00 PEPTIC REFLUX ESOPHAGITIS: ICD-10-CM

## 2020-05-11 RX ORDER — HYDROCHLOROTHIAZIDE 25 MG/1
TABLET ORAL
Qty: 90 TABLET | Refills: 0 | Status: SHIPPED | OUTPATIENT
Start: 2020-05-11 | End: 2020-08-05

## 2020-05-11 RX ORDER — OMEPRAZOLE 20 MG/1
CAPSULE, DELAYED RELEASE ORAL
Qty: 90 CAPSULE | Refills: 0 | Status: SHIPPED | OUTPATIENT
Start: 2020-05-11 | End: 2020-08-05

## 2020-05-27 DIAGNOSIS — E11.9 TYPE 2 DIABETES MELLITUS WITHOUT COMPLICATION, WITHOUT LONG-TERM CURRENT USE OF INSULIN (HCC): ICD-10-CM

## 2020-06-25 DIAGNOSIS — E11.69 HYPERLIPIDEMIA ASSOCIATED WITH TYPE 2 DIABETES MELLITUS (HCC): ICD-10-CM

## 2020-06-25 DIAGNOSIS — E78.5 HYPERLIPIDEMIA ASSOCIATED WITH TYPE 2 DIABETES MELLITUS (HCC): ICD-10-CM

## 2020-06-29 RX ORDER — ATORVASTATIN CALCIUM 10 MG/1
10 TABLET, FILM COATED ORAL DAILY
Qty: 90 TABLET | Refills: 1 | Status: SHIPPED | OUTPATIENT
Start: 2020-06-29 | End: 2020-12-22

## 2020-07-10 DIAGNOSIS — E11.9 TYPE 2 DIABETES MELLITUS WITHOUT COMPLICATION, WITHOUT LONG-TERM CURRENT USE OF INSULIN (HCC): Primary | ICD-10-CM

## 2020-07-10 DIAGNOSIS — I10 BENIGN ESSENTIAL HYPERTENSION: ICD-10-CM

## 2020-08-05 DIAGNOSIS — I10 ESSENTIAL HYPERTENSION: ICD-10-CM

## 2020-08-05 DIAGNOSIS — K21.00 PEPTIC REFLUX ESOPHAGITIS: ICD-10-CM

## 2020-08-05 RX ORDER — HYDROCHLOROTHIAZIDE 25 MG/1
TABLET ORAL
Qty: 90 TABLET | Refills: 0 | Status: SHIPPED | OUTPATIENT
Start: 2020-08-05 | End: 2020-11-02

## 2020-08-05 RX ORDER — OMEPRAZOLE 20 MG/1
CAPSULE, DELAYED RELEASE ORAL
Qty: 90 CAPSULE | Refills: 0 | Status: SHIPPED | OUTPATIENT
Start: 2020-08-05 | End: 2020-11-02

## 2020-08-23 LAB
ALBUMIN SERPL-MCNC: 4.9 G/DL (ref 3.8–4.9)
ALBUMIN/GLOB SERPL: 1.8 {RATIO} (ref 1.2–2.2)
ALP SERPL-CCNC: 44 IU/L (ref 39–117)
ALT SERPL-CCNC: 47 IU/L (ref 0–44)
AST SERPL-CCNC: 28 IU/L (ref 0–40)
BILIRUB SERPL-MCNC: 0.6 MG/DL (ref 0–1.2)
BUN SERPL-MCNC: 24 MG/DL (ref 6–24)
BUN/CREAT SERPL: 25 (ref 9–20)
CALCIUM SERPL-MCNC: 10.3 MG/DL (ref 8.7–10.2)
CHLORIDE SERPL-SCNC: 100 MMOL/L (ref 96–106)
CHOLEST SERPL-MCNC: 142 MG/DL (ref 100–199)
CHOLEST/HDLC SERPL: 3.9 RATIO (ref 0–5)
CO2 SERPL-SCNC: 24 MMOL/L (ref 20–29)
CREAT SERPL-MCNC: 0.95 MG/DL (ref 0.76–1.27)
EST. AVERAGE GLUCOSE BLD GHB EST-MCNC: 131 MG/DL
GLOBULIN SER-MCNC: 2.7 G/DL (ref 1.5–4.5)
GLUCOSE SERPL-MCNC: 118 MG/DL (ref 65–99)
HBA1C MFR BLD: 6.2 % (ref 4.8–5.6)
HDLC SERPL-MCNC: 36 MG/DL
LDLC SERPL CALC-MCNC: 88 MG/DL (ref 0–99)
POTASSIUM SERPL-SCNC: 4.9 MMOL/L (ref 3.5–5.2)
PROT SERPL-MCNC: 7.6 G/DL (ref 6–8.5)
SL AMB EGFR AFRICAN AMERICAN: 103 ML/MIN/1.73
SL AMB EGFR NON AFRICAN AMERICAN: 89 ML/MIN/1.73
SL AMB VLDL CHOLESTEROL CALC: 18 MG/DL (ref 5–40)
SODIUM SERPL-SCNC: 138 MMOL/L (ref 134–144)
TRIGL SERPL-MCNC: 88 MG/DL (ref 0–149)

## 2020-08-23 PROCEDURE — 3044F HG A1C LEVEL LT 7.0%: CPT | Performed by: FAMILY MEDICINE

## 2020-08-31 ENCOUNTER — OFFICE VISIT (OUTPATIENT)
Dept: FAMILY MEDICINE CLINIC | Facility: CLINIC | Age: 55
End: 2020-08-31
Payer: COMMERCIAL

## 2020-08-31 VITALS
TEMPERATURE: 97.9 F | DIASTOLIC BLOOD PRESSURE: 76 MMHG | OXYGEN SATURATION: 99 % | HEIGHT: 68 IN | RESPIRATION RATE: 16 BRPM | HEART RATE: 82 BPM | BODY MASS INDEX: 36.68 KG/M2 | WEIGHT: 242 LBS | SYSTOLIC BLOOD PRESSURE: 138 MMHG

## 2020-08-31 DIAGNOSIS — E66.01 MORBID OBESITY (HCC): ICD-10-CM

## 2020-08-31 DIAGNOSIS — Z12.5 ENCOUNTER FOR PROSTATE CANCER SCREENING: ICD-10-CM

## 2020-08-31 DIAGNOSIS — E11.9 TYPE 2 DIABETES MELLITUS WITHOUT COMPLICATION, WITHOUT LONG-TERM CURRENT USE OF INSULIN (HCC): Primary | ICD-10-CM

## 2020-08-31 DIAGNOSIS — Z23 NEED FOR INFLUENZA VACCINATION: ICD-10-CM

## 2020-08-31 DIAGNOSIS — I10 BENIGN ESSENTIAL HYPERTENSION: ICD-10-CM

## 2020-08-31 DIAGNOSIS — E78.5 HYPERLIPIDEMIA ASSOCIATED WITH TYPE 2 DIABETES MELLITUS (HCC): ICD-10-CM

## 2020-08-31 DIAGNOSIS — E11.69 HYPERLIPIDEMIA ASSOCIATED WITH TYPE 2 DIABETES MELLITUS (HCC): ICD-10-CM

## 2020-08-31 DIAGNOSIS — K21.00 PEPTIC REFLUX ESOPHAGITIS: ICD-10-CM

## 2020-08-31 PROCEDURE — 90471 IMMUNIZATION ADMIN: CPT

## 2020-08-31 PROCEDURE — 3075F SYST BP GE 130 - 139MM HG: CPT | Performed by: FAMILY MEDICINE

## 2020-08-31 PROCEDURE — 3008F BODY MASS INDEX DOCD: CPT | Performed by: FAMILY MEDICINE

## 2020-08-31 PROCEDURE — 90682 RIV4 VACC RECOMBINANT DNA IM: CPT

## 2020-08-31 PROCEDURE — 2022F DILAT RTA XM EVC RTNOPTHY: CPT | Performed by: FAMILY MEDICINE

## 2020-08-31 PROCEDURE — 1036F TOBACCO NON-USER: CPT | Performed by: FAMILY MEDICINE

## 2020-08-31 PROCEDURE — 3078F DIAST BP <80 MM HG: CPT | Performed by: FAMILY MEDICINE

## 2020-08-31 PROCEDURE — 99214 OFFICE O/P EST MOD 30 MIN: CPT | Performed by: FAMILY MEDICINE

## 2020-08-31 NOTE — PROGRESS NOTES
[address]         NAME: Herberth Adams is a 63 y o  male  : 1965    MRN: [id number]  DATE: 2020  TIME: 5:23 PM    Assessment and Plan   Type 2 diabetes mellitus without complication, without long-term current use of insulin (HCC) [E11 9]  1  Type 2 diabetes mellitus without complication, without long-term current use of insulin (HCC)  metFORMIN (GLUCOPHAGE) 850 mg tablet    Comprehensive metabolic panel    Hemoglobin A1C    TSH, 3rd generation with Free T4 reflex    Comprehensive metabolic panel    Hemoglobin A1C    TSH, 3rd generation with Free T4 reflex   2  Hyperlipidemia associated with type 2 diabetes mellitus ([facility] [address] )     3  Benign essential hypertension  Comprehensive metabolic panel    CBC and differential    TSH, 3rd generation with Free T4 reflex    Comprehensive metabolic panel    CBC and differential    TSH, 3rd generation with Free T4 reflex   4  Morbid obesity ([facility] )     5  Peptic reflux esophagitis  CBC and differential    CBC and differential   6  Need for influenza vaccination  influenza vaccine, quadrivalent, recombinant, PF, 0 5 mL, for patients 18 yr+ ([facility])   7  Encounter for prostate cancer screening  PSA Total (Reflex To Free)    PSA Total (Reflex To Free)       No problem-specific Assessment & Plan notes found for this encounter  Patient Instructions     Patient Instructions   MEDICAL MANAGEMENT-CONTINUE CURRENT MEDICATIONS  Continue to work on diet, exercise, and weight loss  Repeat labs in 6 months to include BMP and A1c  Get  diabetic eye exam when due in October  Chief Complaint     Chief Complaint   Patient presents with    Follow-up     MM/LABS         History of Present Illness       6 month medical management visit-  1) diabetes-good control current diet and medications  2) hyperlipidemia-improved results recently  3) hypertension-good control current medications no cardiac symptoms    4) reflux disease-stable with current medications  He had been trying to switch to Pepcid but had side effects size return to omeprazole  5) obesity-patient has been able to lose 25 lb over the last 3 months  Review of Systems   Review of Systems   Constitutional: Negative for activity change, appetite change, diaphoresis and fatigue  Respiratory: Negative for cough, chest tightness, shortness of breath and wheezing  Cardiovascular: Negative for chest pain, palpitations and leg swelling  Fast or slow heart rate   Gastrointestinal: Negative for abdominal pain, blood in stool, constipation, diarrhea, nausea and vomiting  Genitourinary: Negative for difficulty urinating, dysuria and frequency  Musculoskeletal: Negative for arthralgias, gait problem, joint swelling and myalgias  Neurological: Negative for dizziness, weakness, light-headedness, numbness and headaches  Psychiatric/Behavioral: Negative for agitation, confusion, dysphoric mood and sleep disturbance  The patient is not nervous/anxious            Current Medications       Current Outpatient Medications:     atorvastatin (LIPITOR) 10 mg tablet, TAKE 1 TABLET (10 MG TOTAL) BY MOUTH DAILY AS DIRECTED, Disp: 90 tablet, Rfl: 1    hydrochlorothiazide (HYDRODIURIL) 25 mg tablet, TAKE 1 TABLET BY MOUTH EVERY DAY, Disp: 90 tablet, Rfl: 0    metFORMIN (GLUCOPHAGE) 850 mg tablet, Take 1 tablet (850 mg total) by mouth 2 (two) times a day, Disp: 180 tablet, Rfl: 1    Misc Natural Products (OSTEO BI-FLEX ADV TRIPLE ST) TABS, Take 1 tablet by mouth 2 (two) times a day, Disp: , Rfl:     omeprazole (PriLOSEC) 20 mg delayed release capsule, TAKE 1 CAPSULE BY MOUTH EVERY DAY, Disp: 90 capsule, Rfl: 0    quinapril (ACCUPRIL) 40 MG tablet, TAKE 1 TABLET BY MOUTH EVERY DAY, Disp: 90 tablet, Rfl: 1    Current Allergies     Allergies as of 08/31/2020    (No Known Allergies)            The following portions of the patient's history were reviewed and updated as appropriate: allergies, current medications, past family history, past medical history, past social history, past surgical history and problem list      History reviewed  No pertinent past medical history  Past Surgical History:   Procedure Laterality Date    NO PAST SURGERIES         Family History   Problem Relation Age of Onset    Diabetes Father     Hyperlipidemia Father     Bipolar disorder Sister         1 disorder    Other Sister         hx of suicide    Lung cancer Mother     Substance Abuse Neg Hx          Medications have been verified  Objective   /76   Pulse 82   Temp 97 9 °F (36 6 °C)   Resp 16   Ht 5' 8" (1 727 m)   Wt 110 kg (242 lb)   SpO2 99%   BMI 36 80 kg/m²        Physical Exam     Physical Exam  Vitals signs reviewed  Constitutional:       General: He is not in acute distress  Appearance: He is well-developed  He is not diaphoretic  HENT:      Head: Normocephalic and atraumatic  Right Ear: Tympanic membrane, ear canal and external ear normal       Left Ear: Tympanic membrane, ear canal and external ear normal       Nose: No mucosal edema or rhinorrhea  Right Sinus: No maxillary sinus tenderness  Left Sinus: No maxillary sinus tenderness  Mouth/Throat:      Mouth: Mucous membranes are not pale and not dry  Dentition: Normal dentition  Pharynx: Uvula midline  No oropharyngeal exudate  Eyes:      General:         Right eye: No discharge  Left eye: No discharge  Pupils: Pupils are equal, round, and reactive to light  Neck:      Musculoskeletal: Normal range of motion and neck supple  Thyroid: No thyromegaly  Cardiovascular:      Rate and Rhythm: Normal rate and regular rhythm  Heart sounds: Normal heart sounds  No murmur  Pulmonary:      Effort: Pulmonary effort is normal  No respiratory distress  Breath sounds: Normal breath sounds  No wheezing or rales  Musculoskeletal: Normal range of motion  General: No tenderness  Lymphadenopathy:      Cervical: No cervical adenopathy  Neurological:      Mental Status: He is alert and oriented to person, place, and time  Cranial Nerves: No cranial nerve deficit     Psychiatric:         Mood and Affect: Mood normal          Behavior: Behavior normal

## 2020-08-31 NOTE — PATIENT INSTRUCTIONS
MEDICAL MANAGEMENT-CONTINUE CURRENT MEDICATIONS  Continue to work on diet, exercise, and weight loss  Repeat labs in 6 months to include BMP and A1c  Get  diabetic eye exam when due in October

## 2020-10-06 LAB
LEFT EYE DIABETIC RETINOPATHY: NORMAL
RIGHT EYE DIABETIC RETINOPATHY: NORMAL

## 2020-10-14 DIAGNOSIS — I10 HYPERTENSION, UNSPECIFIED TYPE: ICD-10-CM

## 2020-10-14 RX ORDER — QUINAPRIL 40 MG/1
TABLET ORAL
Qty: 90 TABLET | Refills: 1 | Status: SHIPPED | OUTPATIENT
Start: 2020-10-14 | End: 2021-04-28

## 2020-11-02 DIAGNOSIS — K21.00 PEPTIC REFLUX ESOPHAGITIS: ICD-10-CM

## 2020-11-02 DIAGNOSIS — I10 ESSENTIAL HYPERTENSION: ICD-10-CM

## 2020-11-02 RX ORDER — OMEPRAZOLE 20 MG/1
CAPSULE, DELAYED RELEASE ORAL
Qty: 90 CAPSULE | Refills: 0 | Status: SHIPPED | OUTPATIENT
Start: 2020-11-02 | End: 2021-01-26

## 2020-11-02 RX ORDER — HYDROCHLOROTHIAZIDE 25 MG/1
TABLET ORAL
Qty: 90 TABLET | Refills: 0 | Status: SHIPPED | OUTPATIENT
Start: 2020-11-02 | End: 2021-01-26

## 2020-12-22 DIAGNOSIS — E78.5 HYPERLIPIDEMIA ASSOCIATED WITH TYPE 2 DIABETES MELLITUS (HCC): ICD-10-CM

## 2020-12-22 DIAGNOSIS — E11.69 HYPERLIPIDEMIA ASSOCIATED WITH TYPE 2 DIABETES MELLITUS (HCC): ICD-10-CM

## 2020-12-22 RX ORDER — ATORVASTATIN CALCIUM 10 MG/1
10 TABLET, FILM COATED ORAL DAILY
Qty: 90 TABLET | Refills: 1 | Status: SHIPPED | OUTPATIENT
Start: 2020-12-22 | End: 2021-04-28

## 2021-01-18 ENCOUNTER — TELEPHONE (OUTPATIENT)
Dept: PAIN MEDICINE | Facility: MEDICAL CENTER | Age: 56
End: 2021-01-18

## 2021-01-26 ENCOUNTER — CONSULT (OUTPATIENT)
Dept: PAIN MEDICINE | Facility: CLINIC | Age: 56
End: 2021-01-26
Payer: COMMERCIAL

## 2021-01-26 VITALS
HEART RATE: 63 BPM | DIASTOLIC BLOOD PRESSURE: 82 MMHG | BODY MASS INDEX: 39.77 KG/M2 | TEMPERATURE: 97.7 F | SYSTOLIC BLOOD PRESSURE: 160 MMHG | WEIGHT: 262.4 LBS | HEIGHT: 68 IN

## 2021-01-26 DIAGNOSIS — M51.26 LUMBAR DISC HERNIATION: ICD-10-CM

## 2021-01-26 DIAGNOSIS — M47.816 LUMBAR SPONDYLOSIS: ICD-10-CM

## 2021-01-26 DIAGNOSIS — I10 ESSENTIAL HYPERTENSION: ICD-10-CM

## 2021-01-26 DIAGNOSIS — E11.9 TYPE 2 DIABETES MELLITUS WITHOUT COMPLICATION, WITHOUT LONG-TERM CURRENT USE OF INSULIN (HCC): ICD-10-CM

## 2021-01-26 DIAGNOSIS — K21.00 PEPTIC REFLUX ESOPHAGITIS: ICD-10-CM

## 2021-01-26 DIAGNOSIS — M48.062 SPINAL STENOSIS OF LUMBAR REGION WITH NEUROGENIC CLAUDICATION: Primary | ICD-10-CM

## 2021-01-26 PROCEDURE — 3077F SYST BP >= 140 MM HG: CPT | Performed by: ANESTHESIOLOGY

## 2021-01-26 PROCEDURE — 1036F TOBACCO NON-USER: CPT | Performed by: ANESTHESIOLOGY

## 2021-01-26 PROCEDURE — 3079F DIAST BP 80-89 MM HG: CPT | Performed by: ANESTHESIOLOGY

## 2021-01-26 PROCEDURE — 3008F BODY MASS INDEX DOCD: CPT | Performed by: ANESTHESIOLOGY

## 2021-01-26 PROCEDURE — 99244 OFF/OP CNSLTJ NEW/EST MOD 40: CPT | Performed by: ANESTHESIOLOGY

## 2021-01-26 RX ORDER — HYDROCHLOROTHIAZIDE 25 MG/1
TABLET ORAL
Qty: 90 TABLET | Refills: 0 | Status: SHIPPED | OUTPATIENT
Start: 2021-01-26 | End: 2021-06-23 | Stop reason: SDUPTHER

## 2021-01-26 RX ORDER — OMEPRAZOLE 20 MG/1
CAPSULE, DELAYED RELEASE ORAL
Qty: 90 CAPSULE | Refills: 0 | Status: SHIPPED | OUTPATIENT
Start: 2021-01-26 | End: 2021-06-23 | Stop reason: SDUPTHER

## 2021-01-26 NOTE — PATIENT INSTRUCTIONS
Epidural Steroid Injection   AMBULATORY CARE:   What you need to know about an epidural steroid injection (DEBRA):  An DEBRA is a procedure to inject steroid medicine into the epidural space  The epidural space is between your spinal cord and vertebrae  Steroids reduce inflammation and fluid buildup in your spine that may be causing pain  You may be given pain medicine along with the steroids  How to prepare for an DEBRA:  Your healthcare provider will talk to you about how to prepare for your procedure  He or she will tell you what medicines to take or not take on the day of your procedure  You may need to stop taking blood thinners or other medicines several days before your procedure  You may need to adjust any diabetes medicine you take on the day of your procedure  Steroid medicine can increase your blood sugar level  Arrange for someone to drive you home when you are discharged  What will happen during an DEBRA:   · You will be given medicine to numb the procedure area  You will be awake for the procedure, but you will not feel pain  You may also be given medicine to help you relax  Contrast liquid will be used to help your healthcare provider see the area better  Tell the healthcare provider if you have ever had an allergic reaction to contrast liquid  · Your healthcare provider may place the needle into your neck area, middle of your back, or tailbone area  He may inject the medicine next to the nerves that are causing your pain  He may instead inject the medicine into a larger area of the epidural space  This helps the medicine spread to more nerves  Your healthcare provider will use a fluoroscope to help guide the needle to the right place  A fluoroscope is a type of x-ray  After the procedure, a bandage will be placed over the injection site to prevent infection  What will happen after an DEBRA:  You will have a bandage over the injection site to prevent infection   Your healthcare provider will tell you when you can bathe and any activity guidelines  You will be able to go home  Risks of an DEBRA:  You may have temporary or permanent nerve damage or paralysis  You may have bleeding or develop a serious infection, such as meningitis (swelling of the brain coverings)  An abscess may also develop  An abscess is a pus-filled area under the skin  You may need surgery to fix the abscess  You may have a seizure, anxiety, or trouble sleeping  If you are a man, you may have temporary erectile dysfunction (not able to have an erection)  Call your local emergency number (911 in the [address] [place] [address],[address]) if:   · You have a seizure  · You have trouble moving your legs  Seek care immediately if:   · Blood soaks through your bandage  · You have a fever or chills, severe back pain, and the procedure area is sensitive to the touch  · You cannot control when you urinate or have a bowel movement  Call your doctor if:   · You have weakness or numbness in your legs  · Your wound is red, swollen, or draining pus  · You have nausea or are vomiting  · Your face or neck is red and you feel warm  · You have more pain than you had before the procedure  · You have swelling in your hands or feet  · You have questions or concerns about your condition or care  Care for your wound as directed: You may remove the bandage before you go to bed the day of your procedure  You may take a shower, but do not take a bath for at least 24 hours  Self-care:   · Do not drive,  use machines, or do strenuous activity for 24 hours after your procedure or as directed  · Continue other treatments  as directed  Steroid injections alone will not control your pain  The injections are meant to be used with other treatments, such as physical therapy  Follow up with your doctor as directed:  Write down your questions so you remember to ask them during your visits     © Copyright [employer] 2020 Information is for End User's use only and may not be sold, redistributed or otherwise used for commercial purposes  All illustrations and images included in CareNotes® are the copyrighted property of [employer]  or Sheri Bradshaw  The above information is an  only  It is not intended as medical advice for individual conditions or treatments  Talk to your doctor, nurse or pharmacist before following any medical regimen to see if it is safe and effective for you

## 2021-01-26 NOTE — PROGRESS NOTES
Assessment  1  Spinal stenosis of lumbar region with neurogenic claudication    2  Lumbar disc herniation    3  Lumbar spondylosis    4  Type 2 diabetes mellitus without complication, without long-term current use of insulin ([address] )        Plan    The patient's pain persists despite time, relative rest, activity modification and chiropractic treatment  I believe that he would benefit from a lumbar epidural steroid injection to diminish any inflammatory component of his pain  I will initially use an translaminar approach  If the patient does not receive significant pain relief following the initial injection, I may need to repeat using a transforaminal approach that may allow for better concentrate of the steroid along the affected nerve root  If his pain persists despite the may consider diagnostic branch blocks determine if his pain is facet in origin but will re-evaluate if needed  He should continue with his chiropractic treatment  Given the patient's history of diabetes, there may be an increased risk of infection in association with the procedure although the overall risk is low  In addition, following the injection there may be a transient elevation in serum glucose levels for several days  The patient understands that this may require additional glycemic coverage in coordination with the treating physician  In the office today, we reviewed the nature of the patient's pathology in depth using  diagrams and models  I discussed the approach I would use for the epidural steroid injection and provided literature for home review  The patient understands the risks associated with the procedure including but not limited to bleeding, infection, tissue injury, exacerbation of symptoms, allergic reaction, decreased immunity secondary to steroid injection, spinal headache, and paralysis and provided verbal consent  today      My impressions and treatment recommendations were discussed in detail with the patient who verbalized understanding and had no further questions  Discharge instructions were provided  I personally saw and examined the patient and I agree with the above discussed plan of care  This note is created using dictation transcription  It may contain typographical errors, grammatical errors, improperly dictated words, background noise and other errors  Orders Placed This Encounter   Procedures    [place] spine and pain procedure     Standing Status:   Future     Standing Expiration Date:   1/26/2025     Order Specific Question:   Reason for Exam:     Answer:   LESI 1  Order Specific Question:   Anticoagulant hold needed? Answer:   No    [place] spine and pain procedure     Standing Status:   Future     Standing Expiration Date:   1/26/2025     Order Specific Question:   Reason for Exam:     Answer:   LESI 2  Order Specific Question:   Anticoagulant hold needed? Answer:   No       Referred By Self  History of Present Illness    Dakota Owen is a 63 y o  male with a longstanding history of back and leg pain relieved with chiropractic treatment  He states he had an injury on October 15, 2019 and since then has developed worsening back pain despite nonsteroidal anti-inflammatories  His pain is moderate rates as 410 on the visual analog scale is intermittent but worse in the morning  Describes burning numbness and sharp denies any weakness of his upper lower limbs  He reports that lying down, bending, walking and exercise decreases symptoms while standing coughing and sneezing aggravate it  He denies bowel or bladder dysfunction  I have personally reviewed and/or updated the patient's past medical history, past surgical history, family history, social history, current medications, allergies, and vital signs today  Review of Systems   Constitutional: Negative for chills and fever  HENT: Negative for ear pain and sore throat      Eyes: Negative for pain and visual disturbance  Respiratory: Negative for cough and shortness of breath  Cardiovascular: Negative for chest pain and palpitations  Gastrointestinal: Negative for abdominal pain and vomiting  Genitourinary: Negative for dysuria and hematuria  Musculoskeletal: Negative for arthralgias and back pain  Skin: Negative for color change and rash  Neurological: Negative for seizures and syncope  All other systems reviewed and are negative        Patient Active Problem List   Diagnosis    Type 2 diabetes mellitus without complication, without long-term current use of insulin ([place] [address] )    Benign essential hypertension    Hyperlipidemia associated with type 2 diabetes mellitus ([facility] )    Peptic reflux esophagitis    Morbid obesity ([facility] )    Spinal stenosis of lumbar region with neurogenic claudication       Past Medical History:   Diagnosis Date    Diabetes mellitus ([place] [address] )     Hypertension        Past Surgical History:   Procedure Laterality Date    NO PAST SURGERIES         Family History   Problem Relation Age of Onset    Diabetes Father     Hyperlipidemia Father     Bipolar disorder Sister         1 disorder    Other Sister         hx of suicide    Lung cancer Mother     Substance Abuse Neg Hx        Social History     Occupational History     Comment: full time   Tobacco Use    Smoking status: Former Smoker     Types: Cigarettes     Quit date:      Years since quittin 0    Smokeless tobacco: Never Used   Substance and Sexual Activity    Alcohol use: Yes     Frequency: 2-4 times a month     Drinks per session: 3 or 4     Binge frequency: Never    Drug use: No    Sexual activity: Yes     Partners: Female       Current Outpatient Medications on File Prior to Visit   Medication Sig    atorvastatin (LIPITOR) 10 mg tablet TAKE 1 TABLET (10 MG TOTAL) BY MOUTH DAILY AS DIRECTED    metFORMIN (GLUCOPHAGE) 850 mg tablet Take 1 tablet (850 mg total) by mouth 2 (two) times a day    Misc Natural Products (OSTEO BI-FLEX ADV TRIPLE ST) TABS Take 1 tablet by mouth 2 (two) times a day    quinapril (ACCUPRIL) 40 MG tablet TAKE 1 TABLET BY MOUTH EVERY DAY    [DISCONTINUED] hydrochlorothiazide (HYDRODIURIL) 25 mg tablet TAKE 1 TABLET BY MOUTH EVERY DAY    [DISCONTINUED] omeprazole (PriLOSEC) 20 mg delayed release capsule TAKE 1 CAPSULE BY MOUTH EVERY DAY     No current facility-administered medications on file prior to visit  No Known Allergies    Physical Exam    /82 (BP Location: Left arm, Patient Position: Sitting, Cuff Size: Standard)   Pulse 63   Temp 97 7 °F (36 5 °C)   Ht 5' 8" (1 727 m)   Wt 119 kg (262 lb 6 4 oz)   BMI 39 90 kg/m²     Constitutional: normal, well developed, well nourished, alert, in no distress and non-toxic and no overt pain behavior  and obese  Eyes: anicteric  HEENT: grossly intact  Neck: supple, symmetric, trachea midline and no masses   Pulmonary:even and unlabored  Cardiovascular:No edema or pitting edema present  Skin:Normal without rashes or lesions and well hydrated  Psychiatric:Mood and affect appropriate  Neurologic:Cranial Nerves II-XII grossly intact  Musculoskeletal:normal, difficulty going from sitting to standing to sitting position no obvious skin lesions or erythema lumbar sacral spine no tenderness to palpation lumbar sacral spine spinous process sacroiliac joint or greater trochanter bilateral, deep tendon reflexes diminished but symmetrical bilateral patella Achilles, no focal motor deficit appreciated lower limbs, no sensory deficit appreciated lower limbs, negative bilateral straight leg raising positive pain with    Imaging  MRI Lumbar Spine @ [place] 10-8-20   Impression:   2  At L1 lumbar extension  L3-L4 there is a disc bulge  There is moderate central spinal canal stenosis  Moderate to severe left neural foramen stenosis causes left L3 nerve compression  There is moderate right L3 neural foramen stenosis     3  At L4-L5 there is a central disc herniation  There is moderate to severe bilateral neural foramen stenosis with compression of both L4 nerves  4  At L5-S1 there is mild bilateral neural foramen stenosis  I have personally reviewed pertinent films in PACS

## 2021-02-04 ENCOUNTER — HOSPITAL ENCOUNTER (OUTPATIENT)
Dept: RADIOLOGY | Facility: CLINIC | Age: 56
Discharge: HOME | End: 2021-02-04
Attending: ANESTHESIOLOGY | Admitting: ANESTHESIOLOGY
Payer: COMMERCIAL

## 2021-02-04 VITALS
RESPIRATION RATE: 18 BRPM | SYSTOLIC BLOOD PRESSURE: 159 MMHG | HEART RATE: 74 BPM | OXYGEN SATURATION: 98 % | DIASTOLIC BLOOD PRESSURE: 82 MMHG | TEMPERATURE: 97.9 F

## 2021-02-04 DIAGNOSIS — M51.26 LUMBAR DISC HERNIATION: ICD-10-CM

## 2021-02-04 DIAGNOSIS — M48.062 SPINAL STENOSIS OF LUMBAR REGION WITH NEUROGENIC CLAUDICATION: ICD-10-CM

## 2021-02-04 PROCEDURE — 62323 NJX INTERLAMINAR LMBR/SAC: CPT | Performed by: ANESTHESIOLOGY

## 2021-02-04 RX ORDER — METHYLPREDNISOLONE ACETATE 80 MG/ML
80 INJECTION, SUSPENSION INTRA-ARTICULAR; INTRALESIONAL; INTRAMUSCULAR; PARENTERAL; SOFT TISSUE ONCE
Status: COMPLETED | OUTPATIENT
Start: 2021-02-04 | End: 2021-02-04

## 2021-02-04 RX ORDER — LIDOCAINE HYDROCHLORIDE 10 MG/ML
5 INJECTION, SOLUTION EPIDURAL; INFILTRATION; INTRACAUDAL; PERINEURAL ONCE
Status: COMPLETED | OUTPATIENT
Start: 2021-02-04 | End: 2021-02-04

## 2021-02-04 RX ADMIN — METHYLPREDNISOLONE ACETATE 80 MG: 80 INJECTION, SUSPENSION INTRA-ARTICULAR; INTRALESIONAL; INTRAMUSCULAR; SOFT TISSUE at 11:41

## 2021-02-04 RX ADMIN — LIDOCAINE HYDROCHLORIDE 3 ML: 10 INJECTION, SOLUTION EPIDURAL; INFILTRATION; INTRACAUDAL; PERINEURAL at 11:40

## 2021-02-04 RX ADMIN — METHYLPREDNISOLONE ACETATE 80 MG: 80 INJECTION, SUSPENSION INTRA-ARTICULAR; INTRALESIONAL; INTRAMUSCULAR; SOFT TISSUE at 11:42

## 2021-02-04 RX ADMIN — IOHEXOL 1 ML: 300 INJECTION, SOLUTION INTRAVENOUS at 11:42

## 2021-02-04 NOTE — H&P
History of Present Illness: The patient is a 63 y o  male who presents with complaints of  Low back and leg pain  Patient Active Problem List   Diagnosis    Type 2 diabetes mellitus without complication, without long-term current use of insulin (HCC)    Benign essential hypertension    Hyperlipidemia associated with type 2 diabetes mellitus (HCC)    Peptic reflux esophagitis    Morbid [facility] )    Spinal stenosis of lumbar region with neurogenic claudication       Past Medical History:   Diagnosis Date    Diabetes mellitus ([facility] [address] )     Hypertension        Past Surgical History:   Procedure Laterality Date    NO PAST SURGERIES           Current Outpatient Medications:     atorvastatin (LIPITOR) 10 mg tablet, TAKE 1 TABLET (10 MG TOTAL) BY MOUTH DAILY AS DIRECTED, Disp: 90 tablet, Rfl: 1    hydrochlorothiazide (HYDRODIURIL) 25 mg tablet, TAKE 1 TABLET BY MOUTH EVERY DAY, Disp: 90 tablet, Rfl: 0    metFORMIN (GLUCOPHAGE) 850 mg tablet, Take 1 tablet (850 mg total) by mouth 2 (two) times a day, Disp: 180 tablet, Rfl: 1    Misc Natural Products (OSTEO BI-FLEX ADV TRIPLE ST) TABS, Take 1 tablet by mouth 2 (two) times a day, Disp: , Rfl:     omeprazole (PriLOSEC) 20 mg delayed release capsule, TAKE 1 CAPSULE BY MOUTH EVERY DAY, Disp: 90 capsule, Rfl: 0    quinapril (ACCUPRIL) 40 MG tablet, TAKE 1 TABLET BY MOUTH EVERY DAY, Disp: 90 tablet, Rfl: 1    No Known Allergies    Physical Exam:   Vitals:    02/04/21 1130   BP: 153/91   Pulse: 77   Resp: 20   Temp: 97 9 °F (36 6 °C)   SpO2: 97%     General: Awake, Alert, Oriented x 3, Mood and affect appropriate  Respiratory: Respirations even and unlabored  Cardiovascular: Peripheral pulses intact; no edema  Musculoskeletal Exam: Decreased range of motion lumbar    ASA Score: II         Assessment:   1  Spinal stenosis of lumbar region with neurogenic claudication    2  Lumbar disc herniation        Plan: LESI 1

## 2021-02-04 NOTE — DISCHARGE INSTRUCTIONS
Epidural Steroid Injection   WHAT YOU NEED TO KNOW:   An epidural steroid injection (DEBRA) is a procedure to inject steroid medicine into the epidural space  The epidural space is between your spinal cord and vertebrae  Steroids reduce inflammation and fluid buildup in your spine that may be causing pain  You may be given pain medicine along with the steroids  ACTIVITY  · Do not drive or operate machinery today  · No strenuous activity today - bending, lifting, etc   · You may resume normal activites starting tomorrow - start slowly and as tolerated  · You may shower today, but no tub baths or hot tubs  · You may have numbness for several hours from the local anesthetic  Please use caution and common sense, especially with weight-bearing activities  CARE OF THE INJECTION SITE  · If you have soreness or pain, apply ice to the area today (20 minutes on/20 minutes off)  · Starting tomorrow, you may use warm, moist heat or ice if needed  · You may have an increase or change in your discomfort for 36-48 hours after your treatment  · Apply ice and continue with any pain medication you have been prescribed  · [facility] if you have any of the following: redness, drainage, swelling, headache, stiff neck or fever above 100°F     SPECIAL INSTRUCTIONS  · Our office will contact you in approximately 7 days for a progress report  MEDICATIONS  · Continue to take all routine medications  · Our office may have instructed you to hold some medications  If you have a problem specifically related to your procedure, please call our office at [phone]  Problems not related to your procedure should be directed to your primary care physician

## 2021-02-11 ENCOUNTER — TELEPHONE (OUTPATIENT)
Dept: PAIN MEDICINE | Facility: CLINIC | Age: 56
End: 2021-02-11

## 2021-02-18 ENCOUNTER — HOSPITAL ENCOUNTER (OUTPATIENT)
Dept: RADIOLOGY | Facility: CLINIC | Age: 56
Discharge: HOME | End: 2021-02-18
Attending: ANESTHESIOLOGY

## 2021-02-20 DIAGNOSIS — E11.9 TYPE 2 DIABETES MELLITUS WITHOUT COMPLICATION, WITHOUT LONG-TERM CURRENT USE OF INSULIN (HCC): ICD-10-CM

## 2021-02-22 ENCOUNTER — HOSPITAL ENCOUNTER (OUTPATIENT)
Dept: RADIOLOGY | Facility: CLINIC | Age: 56
Discharge: HOME | End: 2021-02-22
Attending: ANESTHESIOLOGY | Admitting: ANESTHESIOLOGY
Payer: COMMERCIAL

## 2021-02-22 VITALS
SYSTOLIC BLOOD PRESSURE: 146 MMHG | HEART RATE: 67 BPM | DIASTOLIC BLOOD PRESSURE: 88 MMHG | OXYGEN SATURATION: 98 % | RESPIRATION RATE: 20 BRPM | TEMPERATURE: 97.2 F

## 2021-02-22 DIAGNOSIS — M51.26 LUMBAR DISC HERNIATION: ICD-10-CM

## 2021-02-22 DIAGNOSIS — M48.062 SPINAL STENOSIS OF LUMBAR REGION WITH NEUROGENIC CLAUDICATION: ICD-10-CM

## 2021-02-22 PROCEDURE — 62323 NJX INTERLAMINAR LMBR/SAC: CPT | Performed by: ANESTHESIOLOGY

## 2021-02-22 RX ORDER — METHYLPREDNISOLONE ACETATE 80 MG/ML
80 INJECTION, SUSPENSION INTRA-ARTICULAR; INTRALESIONAL; INTRAMUSCULAR; PARENTERAL; SOFT TISSUE ONCE
Status: COMPLETED | OUTPATIENT
Start: 2021-02-22 | End: 2021-02-22

## 2021-02-22 RX ORDER — LIDOCAINE HYDROCHLORIDE 10 MG/ML
5 INJECTION, SOLUTION EPIDURAL; INFILTRATION; INTRACAUDAL; PERINEURAL ONCE
Status: COMPLETED | OUTPATIENT
Start: 2021-02-22 | End: 2021-02-22

## 2021-02-22 RX ADMIN — LIDOCAINE HYDROCHLORIDE 3 ML: 10 INJECTION, SOLUTION EPIDURAL; INFILTRATION; INTRACAUDAL; PERINEURAL at 09:42

## 2021-02-22 RX ADMIN — METHYLPREDNISOLONE ACETATE 80 MG: 80 INJECTION, SUSPENSION INTRA-ARTICULAR; INTRALESIONAL; INTRAMUSCULAR; SOFT TISSUE at 09:47

## 2021-02-22 RX ADMIN — IOHEXOL 1 ML: 300 INJECTION, SOLUTION INTRAVENOUS at 09:44

## 2021-02-22 NOTE — H&P
History of Present Illness: The patient is a 63 y o  male who presents with complaints of  Low back and leg pain      Patient Active Problem List   Diagnosis    Type 2 diabetes mellitus without complication, without long-term current use of insulin (HCC)    Benign essential hypertension    Hyperlipidemia associated with type 2 diabetes mellitus (HCC)    Peptic reflux esophagitis    Morbid [facility] ([facility] [address] [facility]    Spinal stenosis of lumbar region with neurogenic claudication    Lumbar disc herniation       Past Medical History:   Diagnosis Date    Diabetes mellitus ([facility] [address] )     Hypertension        Past Surgical History:   Procedure Laterality Date    NO PAST SURGERIES           Current Outpatient Medications:     atorvastatin (LIPITOR) 10 mg tablet, TAKE 1 TABLET (10 MG TOTAL) BY MOUTH DAILY AS DIRECTED, Disp: 90 tablet, Rfl: 1    hydrochlorothiazide (HYDRODIURIL) 25 mg tablet, TAKE 1 TABLET BY MOUTH EVERY DAY, Disp: 90 tablet, Rfl: 0    metFORMIN (GLUCOPHAGE) 850 mg tablet, TAKE 1 TABLET (850 MG TOTAL) BY MOUTH 2 (TWO) TIMES A DAY, Disp: 60 tablet, Rfl: 5    Misc Natural Products (OSTEO BI-FLEX ADV TRIPLE ST) TABS, Take 1 tablet by mouth 2 (two) times a day, Disp: , Rfl:     omeprazole (PriLOSEC) 20 mg delayed release capsule, TAKE 1 CAPSULE BY MOUTH EVERY DAY, Disp: 90 capsule, Rfl: 0    quinapril (ACCUPRIL) 40 MG tablet, TAKE 1 TABLET BY MOUTH EVERY DAY, Disp: 90 tablet, Rfl: 1    Current Facility-Administered Medications:     iohexol (OMNIPAQUE) 300 mg/mL injection 50 mL, 50 mL, Epidural, Once, Calvin Low DO    lidocaine (PF) (XYLOCAINE-MPF) 1 % injection 5 mL, 5 mL, Other, Once, Calvin Low DO    methylPREDNISolone acetate (DEPO-MEDROL) injection 80 mg, 80 mg, Epidural, Once, Calvin Low DO    No Known Allergies    Physical Exam:   Vitals:    02/22/21 0906   BP: 155/88   Pulse: 74   Resp: 20   Temp: (!) 97 2 °F (36 2 °C)   SpO2: 99%     General: Awake, Alert, Oriented x 3, Mood and affect appropriate  Respiratory: Respirations even and unlabored  Cardiovascular: Peripheral pulses intact; no edema  Musculoskeletal Exam:   Decreased range of motion lumbar spine    ASA Score: III    Patient/Chart Verification  Patient ID Verified: Verbal  ID Band Applied: No  Consents Confirmed: Procedural  H&P( within 30 days) Verified: To be obtained in the Pre-Procedure area  Interval H&P(within 24 hr) Complete (required for Outpatients and Surgery Admit only): To be obtained in the Pre-Procedure area  Allergies Reviewed: Yes  Anticoag/NSAID held?: No  Currently on antibiotics?: No  Pre-op Lab/Test Results Available: N/A    Assessment:   1  Spinal stenosis of lumbar region with neurogenic claudication    2  Lumbar disc herniation        Plan: LESI 2

## 2021-02-22 NOTE — DISCHARGE INSTRUCTIONS
Epidural Steroid Injection   WHAT YOU NEED TO KNOW:   An epidural steroid injection (DEBRA) is a procedure to inject steroid medicine into the epidural space  The epidural space is between your spinal cord and vertebrae  Steroids reduce inflammation and fluid buildup in your spine that may be causing pain  You may be given pain medicine along with the steroids  ACTIVITY  · Do not drive or operate machinery today  · No strenuous activity today - bending, lifting, etc   · You may resume normal activites starting tomorrow - start slowly and as tolerated  · You may shower today, but no tub baths or hot tubs  · You may have numbness for several hours from the local anesthetic  Please use caution and common sense, especially with weight-bearing activities  CARE OF THE INJECTION SITE  · If you have soreness or pain, apply ice to the area today (20 minutes on/20 minutes off)  · Starting tomorrow, you may use warm, moist heat or ice if needed  · You may have an increase or change in your discomfort for 36-48 hours after your treatment  · Apply ice and continue with any pain medication you have been prescribed  · [facility]ify [facility] if you have any of the following: redness, drainage, swelling, headache, stiff neck or fever above 100°F     SPECIAL INSTRUCTIONS  · Our office will contact you in approximately 7 days for a progress report  MEDICATIONS  · Continue to take all routine medications  · Our office may have instructed you to hold some medications  If you have a problem specifically related to your procedure, please call our office at [phone]  Problems not related to your procedure should be directed to your primary care physician

## 2021-02-28 LAB
ALBUMIN SERPL-MCNC: 4.7 G/DL (ref 3.8–4.9)
ALBUMIN/GLOB SERPL: 1.6 {RATIO} (ref 1.2–2.2)
ALP SERPL-CCNC: 57 IU/L (ref 39–117)
ALT SERPL-CCNC: 83 IU/L (ref 0–44)
AST SERPL-CCNC: 33 IU/L (ref 0–40)
BASOPHILS # BLD AUTO: 0 X10E3/UL (ref 0–0.2)
BASOPHILS NFR BLD AUTO: 1 %
BILIRUB SERPL-MCNC: 0.5 MG/DL (ref 0–1.2)
BUN SERPL-MCNC: 26 MG/DL (ref 6–24)
BUN/CREAT SERPL: 26 (ref 9–20)
CALCIUM SERPL-MCNC: 9.7 MG/DL (ref 8.7–10.2)
CHLORIDE SERPL-SCNC: 98 MMOL/L (ref 96–106)
CO2 SERPL-SCNC: 26 MMOL/L (ref 20–29)
CREAT SERPL-MCNC: 1 MG/DL (ref 0.76–1.27)
EOSINOPHIL # BLD AUTO: 0.2 X10E3/UL (ref 0–0.4)
EOSINOPHIL NFR BLD AUTO: 2 %
ERYTHROCYTE [DISTWIDTH] IN BLOOD BY AUTOMATED COUNT: 13.5 % (ref 11.6–15.4)
EST. AVERAGE GLUCOSE BLD GHB EST-MCNC: 174 MG/DL
GLOBULIN SER-MCNC: 2.9 G/DL (ref 1.5–4.5)
GLUCOSE SERPL-MCNC: 135 MG/DL (ref 65–99)
HBA1C MFR BLD: 7.7 % (ref 4.8–5.6)
HCT VFR BLD AUTO: 45.6 % (ref 37.5–51)
HGB BLD-MCNC: 14.8 G/DL (ref 13–17.7)
IMM GRANULOCYTES # BLD: 0 X10E3/UL (ref 0–0.1)
IMM GRANULOCYTES NFR BLD: 1 %
LYMPHOCYTES # BLD AUTO: 2 X10E3/UL (ref 0.7–3.1)
LYMPHOCYTES NFR BLD AUTO: 32 %
MCH RBC QN AUTO: 27.5 PG (ref 26.6–33)
MCHC RBC AUTO-ENTMCNC: 32.5 G/DL (ref 31.5–35.7)
MCV RBC AUTO: 85 FL (ref 79–97)
MONOCYTES # BLD AUTO: 0.6 X10E3/UL (ref 0.1–0.9)
MONOCYTES NFR BLD AUTO: 9 %
NEUTROPHILS # BLD AUTO: 3.4 X10E3/UL (ref 1.4–7)
NEUTROPHILS NFR BLD AUTO: 55 %
PLATELET # BLD AUTO: 233 X10E3/UL (ref 150–450)
POTASSIUM SERPL-SCNC: 4.6 MMOL/L (ref 3.5–5.2)
PROT SERPL-MCNC: 7.6 G/DL (ref 6–8.5)
PSA SERPL-MCNC: 0.3 NG/ML (ref 0–4)
RBC # BLD AUTO: 5.38 X10E6/UL (ref 4.14–5.8)
SL AMB EGFR AFRICAN AMERICAN: 97 ML/MIN/1.73
SL AMB EGFR NON AFRICAN AMERICAN: 84 ML/MIN/1.73
SL AMB REFLEX CRITERIA: NORMAL
SODIUM SERPL-SCNC: 137 MMOL/L (ref 134–144)
TSH SERPL DL<=0.005 MIU/L-ACNC: 2.34 UIU/ML (ref 0.45–4.5)
WBC # BLD AUTO: 6.3 X10E3/UL (ref 3.4–10.8)

## 2021-02-28 PROCEDURE — 3051F HG A1C>EQUAL 7.0%<8.0%: CPT | Performed by: NURSE PRACTITIONER

## 2021-02-28 NOTE — RESULT ENCOUNTER NOTE
Call patient with lab result-sugars are much higher, be sure taking all meds as RXed, will discuss at [address] 3/8/21

## 2021-03-01 ENCOUNTER — TELEPHONE (OUTPATIENT)
Dept: FAMILY MEDICINE CLINIC | Facility: CLINIC | Age: 56
End: 2021-03-01

## 2021-03-01 ENCOUNTER — TELEPHONE (OUTPATIENT)
Dept: PAIN MEDICINE | Facility: CLINIC | Age: 56
End: 2021-03-01

## 2021-03-01 DIAGNOSIS — G89.4 CHRONIC PAIN SYNDROME: ICD-10-CM

## 2021-03-01 DIAGNOSIS — G89.29 CHRONIC BILATERAL LOW BACK PAIN WITHOUT SCIATICA: ICD-10-CM

## 2021-03-01 DIAGNOSIS — M48.062 SPINAL STENOSIS OF LUMBAR REGION WITH NEUROGENIC CLAUDICATION: ICD-10-CM

## 2021-03-01 DIAGNOSIS — M54.50 CHRONIC BILATERAL LOW BACK PAIN WITHOUT SCIATICA: ICD-10-CM

## 2021-03-01 DIAGNOSIS — M51.26 LUMBAR DISC HERNIATION: Primary | ICD-10-CM

## 2021-03-01 NOTE — TELEPHONE ENCOUNTER
----- Message from Az Mora MD sent at 2/28/2021  1:20 PM EST -----  Call patient with lab result-sugars are much higher, be sure taking all meds as RXed, will discuss at [address] 3/8/21

## 2021-03-08 ENCOUNTER — OFFICE VISIT (OUTPATIENT)
Dept: FAMILY MEDICINE CLINIC | Facility: CLINIC | Age: 56
End: 2021-03-08
Payer: COMMERCIAL

## 2021-03-08 VITALS
WEIGHT: 267.4 LBS | HEIGHT: 68 IN | HEART RATE: 78 BPM | DIASTOLIC BLOOD PRESSURE: 84 MMHG | TEMPERATURE: 96.5 F | SYSTOLIC BLOOD PRESSURE: 136 MMHG | OXYGEN SATURATION: 97 % | BODY MASS INDEX: 40.53 KG/M2

## 2021-03-08 DIAGNOSIS — E78.5 HYPERLIPIDEMIA ASSOCIATED WITH TYPE 2 DIABETES MELLITUS (HCC): ICD-10-CM

## 2021-03-08 DIAGNOSIS — I10 BENIGN ESSENTIAL HYPERTENSION: ICD-10-CM

## 2021-03-08 DIAGNOSIS — M48.062 SPINAL STENOSIS OF LUMBAR REGION WITH NEUROGENIC CLAUDICATION: ICD-10-CM

## 2021-03-08 DIAGNOSIS — E11.9 TYPE 2 DIABETES MELLITUS WITHOUT COMPLICATION, WITHOUT LONG-TERM CURRENT USE OF INSULIN (HCC): Primary | ICD-10-CM

## 2021-03-08 DIAGNOSIS — E11.69 HYPERLIPIDEMIA ASSOCIATED WITH TYPE 2 DIABETES MELLITUS (HCC): ICD-10-CM

## 2021-03-08 DIAGNOSIS — E66.01 MORBID OBESITY WITH BMI OF 40.0-44.9, ADULT (HCC): ICD-10-CM

## 2021-03-08 PROCEDURE — 3725F SCREEN DEPRESSION PERFORMED: CPT | Performed by: FAMILY MEDICINE

## 2021-03-08 PROCEDURE — 99214 OFFICE O/P EST MOD 30 MIN: CPT | Performed by: FAMILY MEDICINE

## 2021-03-08 NOTE — PATIENT INSTRUCTIONS
WILL CONTINUE MEDICATIONS AT CURRENT DOSES  Repeat labs in 3 months  If A1c is not returning to a better level, may need to considering adding other medications  Continue work on diet, exercise as able, and weight loss  Return visit 3 months  Obesity   AMBULATORY CARE:   Obesity  is when your body mass index (BMI) is greater than 30  Your healthcare provider will use your height and weight to measure your BMI  The risks of obesity include  many health problems, such as injuries or physical disability  You may need tests to check for the following:  · Diabetes    · High blood pressure or high cholesterol    · Heart disease    · Gallbladder or liver disease    · Cancer of the colon, breast, prostate, liver, or kidney    · Sleep apnea    · Arthritis or gout    Seek care immediately if:   · You have a severe headache, confusion, or difficulty speaking  · You have weakness on one side of your body  · You have chest pain, sweating, or shortness of breath  Contact your healthcare provider if:   · You have symptoms of gallbladder or liver disease, such as pain in your upper abdomen  · You have knee or hip pain and discomfort while walking  · You have symptoms of diabetes, such as intense hunger and thirst, and frequent urination  · You have symptoms of sleep apnea, such as snoring or daytime sleepiness  · You have questions or concerns about your condition or care  Treatment for obesity  focuses on helping you lose weight to improve your health  Even a small decrease in BMI can reduce the risk for many health problems  Your healthcare provider will help you set a weight-loss goal   · Lifestyle changes  are the first step in treating obesity  These include making healthy food choices and getting regular physical activity  Your healthcare provider may suggest a weight-loss program that involves coaching, education, and therapy      · Medicine  may help you lose weight when it is used with a healthy diet and physical activity  · Surgery  can help you lose weight if you are very obese and have other health problems  There are several types of weight-loss surgery  Ask your healthcare provider for more information  Be successful losing weight:   · Set small, realistic goals  An example of a small goal is to walk for 20 minutes 5 days a week  Anther goal is to lose 5% of your body weight  · Tell friends, family members, and coworkers about your goals  and ask for their support  Ask a friend to lose weight with you, or join a weight-loss support group  · Identify foods or triggers that may cause you to overeat , and find ways to avoid them  Remove tempting high-calorie foods from your home and workplace  Place a bowl of fresh fruit on your kitchen counter  If stress causes you to eat, then find other ways to cope with stress  · Keep a diary to track what you eat and drink  Also write down how many minutes of physical activity you do each day  Weigh yourself once a week and record it in your diary  Eating changes: You will need to eat 500 to 1,000 fewer calories each day than you currently eat to lose 1 to 2 pounds a week  The following changes will help you cut calories:  · Eat smaller portions  Use small plates, no larger than 9 inches in diameter  Fill your plate half full of fruits and vegetables  Measure your food using measuring cups until you know what a serving size looks like  · Eat 3 meals and 1 or 2 snacks each day  Plan your meals in advance  Henri Lan and eat at home most of the time  Eat slowly  Do not skip meals  Skipping meals can lead to overeating later in the day  This can make it harder for you to lose weight  Talk with a dietitian to help you make a meal plan and schedule that is right for you  · Eat fruits and vegetables at every meal   They are low in calories and high in fiber, which makes you feel full   Do not add butter, margarine, or cream sauce to vegetables  Use herbs to season steamed vegetables  · Eat less fat and fewer fried foods  Eat more baked or grilled chicken and fish  These protein sources are lower in calories and fat than red meat  Limit fast food  Dress your salads with olive oil and vinegar instead of bottled dressing  · Limit the amount of sugar you eat  Do not drink sugary beverages  Limit alcohol  Activity changes:  Physical activity is good for your body in many ways  It helps you burn calories and build strong muscles  It decreases stress and depression, and improves your mood  It can also help you sleep better  Talk to your healthcare provider before you begin an exercise program   · Exercise for at least 30 minutes 5 days a week  Start slowly  Set aside time each day for physical activity that you enjoy and that is convenient for you  It is best to do both weight training and an activity that increases your heart rate, such as walking, bicycling, or swimming  · Find ways to be more active  Do yard work and housecleaning  Walk up the stairs instead of using elevators  Spend your leisure time going to events that require walking, such as outdoor festivals or fairs  This extra physical activity can help you lose weight and keep it off  Follow up with your healthcare provider as directed: You may need to meet with a dietitian  Write down your questions so you remember to ask them during your visits  © Copyright [address] Information is for End User's use only and may not be sold, redistributed or otherwise used for commercial purposes  All illustrations and images included in CareNotes® are the copyrighted property of [employer]  or [facility] [place] Jose Augustine   The above information is an  only  It is not intended as medical advice for individual conditions or treatments   Talk to your doctor, nurse or pharmacist before following any medical regimen to see if it is safe and effective for you     Weight Management   AMBULATORY CARE:   Why it is important to manage your weight:  Being overweight increases your risk of health conditions such as heart disease, high blood pressure, type 2 diabetes, and certain types of cancer  It can also increase your risk for osteoarthritis, sleep apnea, and other respiratory problems  Aim for a slow, steady weight loss  Even a small amount of weight loss can lower your risk of health problems  How to lose weight safely:  A safe and healthy way to lose weight is to eat fewer calories and get regular exercise  · You can lose up about 1 pound a week by decreasing the number of calories you eat by 500 calories each day  You can decrease calories by eating smaller portion sizes or by cutting out high-calorie foods  Read labels to find out how many calories are in the foods you eat  · You can also burn calories with exercise such as walking, swimming, or biking  You will be more likely to keep weight off if you make these changes part of your lifestyle  Exercise at least 30 minutes per day on most days of the week  You can also fit in more physical activity by taking the stairs instead of the elevator or parking farther away from stores  Ask your healthcare provider about the best exercise plan for you  Healthy meal plan for weight management:  A healthy meal plan includes a variety of foods, contains fewer calories, and helps you stay healthy  A healthy meal plan includes the following:     · Eat whole-grain foods more often  A healthy meal plan should contain fiber  Fiber is the part of grains, fruits, and vegetables that is not broken down by your body  Whole-grain foods are healthy and provide extra fiber in your diet  Some examples of whole-grain foods are whole-wheat breads and pastas, oatmeal, brown rice, and bulgur  · Eat a variety of vegetables every day  Include dark, leafy greens such as spinach, kale, artis greens, and mustard greens   Eat yellow and orange vegetables such as carrots, sweet potatoes, and winter squash  · Eat a variety of fruits every day  Choose fresh or canned fruit (canned in its own juice or light syrup) instead of juice  Fruit juice has very little or no fiber  · Eat low-fat dairy foods  Drink fat-free (skim) milk or 1% milk  Eat fat-free yogurt and low-fat cottage cheese  Try low-fat cheeses such as mozzarella and other reduced-fat cheeses  · Choose meat and other protein foods that are low in fat  Choose beans or other legumes such as split peas or lentils  Choose fish, skinless poultry (chicken or turkey), or lean cuts of red meat (beef or pork)  Before you cook meat or poultry, cut off any visible fat  · Use less fat and oil  Try baking foods instead of frying them  Add less fat, such as margarine, sour cream, regular salad dressing and mayonnaise to foods  Eat fewer high-fat foods  Some examples of high-fat foods include french fries, doughnuts, ice cream, and cakes  · Eat fewer sweets  Limit foods and drinks that are high in sugar  This includes candy, cookies, regular soda, and sweetened drinks  Ways to decrease calories:   · Eat smaller portions  ? Use a small plate with smaller servings  ? Do not eat second helpings  ? When you eat at a restaurant, ask for a box and place half of your meal in the box before you eat  ? Share an entrée with someone else  · Replace high-calorie snacks with healthy, low-calorie snacks  ? Choose fresh fruit, vegetables, fat-free rice cakes, or air-popped popcorn instead of potato chips, nuts, or chocolate  ? Choose water or calorie-free drinks instead of soda or sweetened drinks  · Do not shop for groceries when you are hungry  You may be more likely to make unhealthy food choices  Take a grocery list of healthy foods and shop after you have eaten  · Eat regular meals  Do not skip meals   Skipping meals can lead to overeating later in the day  This can make it harder for you to lose weight  Eat a healthy snack in place of a meal if you do not have time to eat a regular meal  Talk with a dietitian to help you create a meal plan and schedule that is right for you  Other things to consider as you try to lose weight:   · Be aware of situations that may give you the urge to overeat, such as eating while watching television  Find ways to avoid these situations  For example, read a book, go for a walk, or do crafts  · Meet with a weight loss support group or friends who are also trying to lose weight  This may help you stay motivated to continue working on your weight loss goals  © Copyright [address] Information is for End User's use only and may not be sold, redistributed or otherwise used for commercial purposes  All illustrations and images included in CareNotes® are the copyrighted property of [employer]  or [employer]   The above information is an  only  It is not intended as medical advice for individual conditions or treatments  Talk to your doctor, nurse or pharmacist before following any medical regimen to see if it is safe and effective for you  Heart Healthy Diet   AMBULATORY CARE:   A heart healthy diet  is an eating plan low in unhealthy fats and sodium (salt)  The plan is high in healthy fats and fiber  A heart healthy diet helps improve your cholesterol levels and lowers your risk for heart disease and stroke  A dietitian will teach you how to read and understand food labels  Heart healthy diet guidelines to follow:   · Choose foods that contain healthy fats  ? Unsaturated fats  include monounsaturated and polyunsaturated fats  Unsaturated fat is found in foods such as soybean, canola, olive, corn, and safflower oils  It is also found in soft tub margarine that is made with liquid vegetable oil      ? Omega-3 fat  is found in certain fish, such as salmon, tuna, and trout, and in walnuts and flaxseed  Eat fish high in omega-3 fats at least 2 times a week  · Get 20 to 30 grams of fiber each day  Fruits, vegetables, whole-grain foods, and legumes (cooked beans) are good sources of fiber  · Limit or do not have unhealthy fats  ? Cholesterol  is found in animal foods, such as eggs and lobster, and in dairy products made from whole milk  Limit cholesterol to less than 200 mg each day  ? Saturated fat  is found in meats, such as izaguirre and hamburger  It is also found in chicken or turkey skin, whole milk, and butter  Limit saturated fat to less than 7% of your total daily calories  ? Trans fat  is found in packaged foods, such as potato chips and cookies  It is also in hard margarine, some fried foods, and shortening  Do not eat foods that contain trans fats  · Limit sodium as directed  You may be told to limit sodium to 2,000 to 2,300 mg each day  Choose low-sodium or no-salt-added foods  Add little or no salt to food you prepare  Use herbs and spices in place of salt  Include the following in your heart healthy plan:  Ask your dietitian or healthcare provider how many servings to have from each of the following food groups:  · Grains:      ? Whole-wheat breads, cereals, and pastas, and brown rice    ? Low-fat, low-sodium crackers and chips    · Vegetables:      ? Broccoli, green beans, green peas, and spinach    ? Collards, kale, and lima beans    ? Carrots, sweet potatoes, tomatoes, and peppers    ? Canned vegetables with no salt added    · Fruits:      ? Bananas, peaches, pears, and pineapple    ? Grapes, raisins, and dates    ? Oranges, tangerines, grapefruit, orange juice, and grapefruit juice    ? Apricots, mangoes, melons, and papaya    ? Raspberries and strawberries    ? Canned fruit with no added sugar    · Low-fat dairy:      ? Nonfat (skim) milk, 1% milk, and low-fat almond, cashew, or soy milks fortified with calcium    ?  Low-fat cheese, regular or frozen yogurt, and cottage cheese    · Meats and proteins:      ? Lean cuts of beef and pork (loin, leg, round), skinless chicken and turkey    ? Legumes, soy products, egg whites, or nuts    Limit or do not include the following in your heart healthy plan:   · Unhealthy fats and oils:      ? Whole or 2% milk, cream cheese, sour cream, or cheese    ? High-fat cuts of beef (T-bone steaks, ribs), chicken or turkey with skin, and organ meats such as liver    ? Butter, stick margarine, shortening, and cooking oils such as coconut or palm oil    · Foods and liquids high in sodium:      ? Packaged foods, such as frozen dinners, cookies, macaroni and cheese, and cereals with more than 300 mg of sodium per serving    ? Vegetables with added sodium, such as instant potatoes, vegetables with added sauces, or regular canned vegetables    ? Cured or smoked meats, such as hot dogs, izaguirre, and sausage    ? High-sodium ketchup, barbecue sauce, salad dressing, pickles, olives, soy sauce, or miso    · Foods and liquids high in sugar:      ? Candy, cake, cookies, pies, or doughnuts    ? Soft drinks (soda), sports drinks, or sweetened tea    ? Canned or dry mixes for cakes, soups, sauces, or gravies    Other healthy heart guidelines:   · Do not smoke  Nicotine and other chemicals in cigarettes and cigars can cause lung and heart damage  Ask your healthcare provider for information if you currently smoke and need help to quit  E-cigarettes or smokeless tobacco still contain nicotine  Talk to your healthcare provider before you use these products  · Limit or do not drink alcohol as directed  Alcohol can damage your heart and raise your blood pressure  Your healthcare provider may give you specific daily and weekly limits  The general recommended limit is 1 drink a day for women 21 or older and for men 72 or older  Do not have more than 3 drinks in a day or 7 in a week   The recommended limit is 2 drinks a day for men 21 to 64 years of age  Suresh Alvarado not have more than 4 drinks in a day or 14 in a week  A drink of alcohol is 12 ounces of beer, 5 ounces of wine, or 1½ ounces of liquor  · Exercise regularly  Exercise can help you maintain a healthy weight and improve your blood pressure and cholesterol levels  Regular exercise can also decrease your risk for heart problems  Ask your healthcare provider about the best exercise plan for you  Do not start an exercise program without asking your healthcare provider  Follow up with your doctor or cardiologist as directed:  Write down your questions so you remember to ask them during your visits  © Copyright [address] Information is for End User's use only and may not be sold, redistributed or otherwise used for commercial purposes  All illustrations and images included in CareNotes® are the copyrighted property of [employer]  or [facility][note 3] Jose Augustine   The above information is an  only  It is not intended as medical advice for individual conditions or treatments  Talk to your doctor, nurse or pharmacist before following any medical regimen to see if it is safe and effective for you

## 2021-03-08 NOTE — PROGRESS NOTES
[facility]        NAME: Rosalie Garcia is a 63 y o  male  : 1965    MRN: [id number]  DATE: 2021  TIME: 3:55 PM    Assessment and Plan   Morbid obesity with BMI of 40 0-44 9, adult ([facility] ) [E66 01, Z68 41]  1  Morbid obesity with BMI of 40 0-44 9, adult ([facility] )     2  Hyperlipidemia associated with type 2 diabetes mellitus (HCC)  Comprehensive metabolic panel    Lipid Panel with Direct LDL reflex    Comprehensive metabolic panel    Lipid Panel with Direct LDL reflex   3  Benign essential hypertension     4  Spinal stenosis of lumbar region with neurogenic claudication     5  Morbid obesity ([facility] )     6  Type 2 diabetes mellitus without complication, without long-term current use of insulin (HCC)  Comprehensive metabolic panel    Hemoglobin A1C    Lipid Panel with Direct LDL reflex    Comprehensive metabolic panel    Hemoglobin A1C    Lipid Panel with Direct LDL reflex       No problem-specific Assessment & Plan notes found for this encounter  Patient Instructions     Patient Instructions       WILL CONTINUE MEDICATIONS AT CURRENT DOSES  Repeat labs in 3 months  If A1c is not returning to a better level, may need to considering adding other medications  Continue work on diet, exercise as able, and weight loss  Return visit 3 months  Obesity   AMBULATORY CARE:   Obesity  is when your body mass index (BMI) is greater than 30  Your healthcare provider will use your height and weight to measure your BMI  The risks of obesity include  many health problems, such as injuries or physical disability  You may need tests to check for the following:  · Diabetes    · High blood pressure or high cholesterol    · Heart disease    · Gallbladder or liver disease    · Cancer of the colon, breast, prostate, liver, or kidney    · Sleep apnea    · Arthritis or gout    Seek care immediately if:   · You have a severe headache, confusion, or difficulty speaking      · You have weakness on one side of your body  · You have chest pain, sweating, or shortness of breath  Contact your healthcare provider if:   · You have symptoms of gallbladder or liver disease, such as pain in your upper abdomen  · You have knee or hip pain and discomfort while walking  · You have symptoms of diabetes, such as intense hunger and thirst, and frequent urination  · You have symptoms of sleep apnea, such as snoring or daytime sleepiness  · You have questions or concerns about your condition or care  Treatment for obesity  focuses on helping you lose weight to improve your health  Even a small decrease in BMI can reduce the risk for many health problems  Your healthcare provider will help you set a weight-loss goal   · Lifestyle changes  are the first step in treating obesity  These include making healthy food choices and getting regular physical activity  Your healthcare provider may suggest a weight-loss program that involves coaching, education, and therapy  · Medicine  may help you lose weight when it is used with a healthy diet and physical activity  · Surgery  can help you lose weight if you are very obese and have other health problems  There are several types of weight-loss surgery  Ask your healthcare provider for more information  Be successful losing weight:   · Set small, realistic goals  An example of a small goal is to walk for 20 minutes 5 days a week  Anther goal is to lose 5% of your body weight  · Tell friends, family members, and coworkers about your goals  and ask for their support  Ask a friend to lose weight with you, or join a weight-loss support group  · Identify foods or triggers that may cause you to overeat , and find ways to avoid them  Remove tempting high-calorie foods from your home and workplace  Place a bowl of fresh fruit on your kitchen counter  If stress causes you to eat, then find other ways to cope with stress      · Keep a diary to track what you eat and drink  Also write down how many minutes of physical activity you do each day  Weigh yourself once a week and record it in your diary  Eating changes: You will need to eat 500 to 1,000 fewer calories each day than you currently eat to lose 1 to 2 pounds a week  The following changes will help you cut calories:  · Eat smaller portions  Use small plates, no larger than 9 inches in diameter  Fill your plate half full of fruits and vegetables  Measure your food using measuring cups until you know what a serving size looks like  · Eat 3 meals and 1 or 2 snacks each day  Plan your meals in advance  Yumi Shepherd and eat at home most of the time  Eat slowly  Do not skip meals  Skipping meals can lead to overeating later in the day  This can make it harder for you to lose weight  Talk with a dietitian to help you make a meal plan and schedule that is right for you  · Eat fruits and vegetables at every meal   They are low in calories and high in fiber, which makes you feel full  Do not add butter, margarine, or cream sauce to vegetables  Use herbs to season steamed vegetables  · Eat less fat and fewer fried foods  Eat more baked or grilled chicken and fish  These protein sources are lower in calories and fat than red meat  Limit fast food  Dress your salads with olive oil and vinegar instead of bottled dressing  · Limit the amount of sugar you eat  Do not drink sugary beverages  Limit alcohol  Activity changes:  Physical activity is good for your body in many ways  It helps you burn calories and build strong muscles  It decreases stress and depression, and improves your mood  It can also help you sleep better  Talk to your healthcare provider before you begin an exercise program   · Exercise for at least 30 minutes 5 days a week  Start slowly  Set aside time each day for physical activity that you enjoy and that is convenient for you   It is best to do both weight training and an activity that increases your heart rate, such as walking, bicycling, or swimming  · Find ways to be more active  Do yard work and housecleaning  Walk up the stairs instead of using elevators  Spend your leisure time going to events that require walking, such as outdoor festivals or fairs  This extra physical activity can help you lose weight and keep it off  Follow up with your healthcare provider as directed: You may need to meet with a dietitian  Write down your questions so you remember to ask them during your visits  © Copyright [address] Information is for End User's use only and may not be sold, redistributed or otherwise used for commercial purposes  All illustrations and images included in CareNotes® are the copyrighted property of [employer] D A M , Inc  or [facility] [employer] Augustine   The above information is an  only  It is not intended as medical advice for individual conditions or treatments  Talk to your doctor, nurse or pharmacist before following any medical regimen to see if it is safe and effective for you  Weight Management   AMBULATORY CARE:   Why it is important to manage your weight:  Being overweight increases your risk of health conditions such as heart disease, high blood pressure, type 2 diabetes, and certain types of cancer  It can also increase your risk for osteoarthritis, sleep apnea, and other respiratory problems  Aim for a slow, steady weight loss  Even a small amount of weight loss can lower your risk of health problems  How to lose weight safely:  A safe and healthy way to lose weight is to eat fewer calories and get regular exercise  · You can lose up about 1 pound a week by decreasing the number of calories you eat by 500 calories each day  You can decrease calories by eating smaller portion sizes or by cutting out high-calorie foods  Read labels to find out how many calories are in the foods you eat           · You can also burn calories with exercise such as walking, swimming, or biking  You will be more likely to keep weight off if you make these changes part of your lifestyle  Exercise at least 30 minutes per day on most days of the week  You can also fit in more physical activity by taking the stairs instead of the elevator or parking farther away from stores  Ask your healthcare provider about the best exercise plan for you  Healthy meal plan for weight management:  A healthy meal plan includes a variety of foods, contains fewer calories, and helps you stay healthy  A healthy meal plan includes the following:     · Eat whole-grain foods more often  A healthy meal plan should contain fiber  Fiber is the part of grains, fruits, and vegetables that is not broken down by your body  Whole-grain foods are healthy and provide extra fiber in your diet  Some examples of whole-grain foods are whole-wheat breads and pastas, oatmeal, brown rice, and bulgur  · Eat a variety of vegetables every day  Include dark, leafy greens such as spinach, kale, artis greens, and mustard greens  Eat yellow and orange vegetables such as carrots, sweet potatoes, and winter squash  · Eat a variety of fruits every day  Choose fresh or canned fruit (canned in its own juice or light syrup) instead of juice  Fruit juice has very little or no fiber  · Eat low-fat dairy foods  Drink fat-free (skim) milk or 1% milk  Eat fat-free yogurt and low-fat cottage cheese  Try low-fat cheeses such as mozzarella and other reduced-fat cheeses  · Choose meat and other protein foods that are low in fat  Choose beans or other legumes such as split peas or lentils  Choose fish, skinless poultry (chicken or turkey), or lean cuts of red meat (beef or pork)  Before you cook meat or poultry, cut off any visible fat  · Use less fat and oil  Try baking foods instead of frying them  Add less fat, such as margarine, sour cream, regular salad dressing and mayonnaise to foods   Eat fewer high-fat foods  Some examples of high-fat foods include french fries, doughnuts, ice cream, and cakes  · Eat fewer sweets  Limit foods and drinks that are high in sugar  This includes candy, cookies, regular soda, and sweetened drinks  Ways to decrease calories:   · Eat smaller portions  ? Use a small plate with smaller servings  ? Do not eat second helpings  ? When you eat at a restaurant, ask for a box and place half of your meal in the box before you eat  ? Share an entrée with someone else  · Replace high-calorie snacks with healthy, low-calorie snacks  ? Choose fresh fruit, vegetables, fat-free rice cakes, or air-popped popcorn instead of potato chips, nuts, or chocolate  ? Choose water or calorie-free drinks instead of soda or sweetened drinks  · Do not shop for groceries when you are hungry  You may be more likely to make unhealthy food choices  Take a grocery list of healthy foods and shop after you have eaten  · Eat regular meals  Do not skip meals  Skipping meals can lead to overeating later in the day  This can make it harder for you to lose weight  Eat a healthy snack in place of a meal if you do not have time to eat a regular meal  Talk with a dietitian to help you create a meal plan and schedule that is right for you  Other things to consider as you try to lose weight:   · Be aware of situations that may give you the urge to overeat, such as eating while watching television  Find ways to avoid these situations  For example, read a book, go for a walk, or do crafts  · Meet with a weight loss support group or friends who are also trying to lose weight  This may help you stay motivated to continue working on your weight loss goals  © Copyright [address] Information is for End User's use only and may not be sold, redistributed or otherwise used for commercial purposes   All illustrations and images included in CareNotes® are the copyrighted property of [employer]  or [facility]  The above information is an  only  It is not intended as medical advice for individual conditions or treatments  Talk to your doctor, nurse or pharmacist before following any medical regimen to see if it is safe and effective for you  Heart Healthy Diet   AMBULATORY CARE:   A heart healthy diet  is an eating plan low in unhealthy fats and sodium (salt)  The plan is high in healthy fats and fiber  A heart healthy diet helps improve your cholesterol levels and lowers your risk for heart disease and stroke  A dietitian will teach you how to read and understand food labels  Heart healthy diet guidelines to follow:   · Choose foods that contain healthy fats  ? Unsaturated fats  include monounsaturated and polyunsaturated fats  Unsaturated fat is found in foods such as soybean, canola, olive, corn, and safflower oils  It is also found in soft tub margarine that is made with liquid vegetable oil  ? Omega-3 fat  is found in certain fish, such as salmon, tuna, and trout, and in walnuts and flaxseed  Eat fish high in omega-3 fats at least 2 times a week  · Get 20 to 30 grams of fiber each day  Fruits, vegetables, whole-grain foods, and legumes (cooked beans) are good sources of fiber  · Limit or do not have unhealthy fats  ? Cholesterol  is found in animal foods, such as eggs and lobster, and in dairy products made from whole milk  Limit cholesterol to less than 200 mg each day  ? Saturated fat  is found in meats, such as izaguirre and hamburger  It is also found in chicken or turkey skin, whole milk, and butter  Limit saturated fat to less than 7% of your total daily calories  ? Trans fat  is found in packaged foods, such as potato chips and cookies  It is also in hard margarine, some fried foods, and shortening  Do not eat foods that contain trans fats  · Limit sodium as directed  You may be told to limit sodium to 2,000 to 2,300 mg each day  Choose low-sodium or no-salt-added foods  Add little or no salt to food you prepare  Use herbs and spices in place of salt  Include the following in your heart healthy plan:  Ask your dietitian or healthcare provider how many servings to have from each of the following food groups:  · Grains:      ? Whole-wheat breads, cereals, and pastas, and brown rice    ? Low-fat, low-sodium crackers and chips    · Vegetables:      ? Broccoli, green beans, green peas, and spinach    ? Collards, kale, and lima beans    ? Carrots, sweet potatoes, tomatoes, and peppers    ? Canned vegetables with no salt added    · Fruits:      ? Bananas, peaches, pears, and pineapple    ? Grapes, raisins, and dates    ? Oranges, tangerines, grapefruit, orange juice, and grapefruit juice    ? Apricots, mangoes, melons, and papaya    ? Raspberries and strawberries    ? Canned fruit with no added sugar    · Low-fat dairy:      ? Nonfat (skim) milk, 1% milk, and low-fat almond, cashew, or soy milks fortified with calcium    ? Low-fat cheese, regular or frozen yogurt, and cottage cheese    · Meats and proteins:      ? Lean cuts of beef and pork (loin, leg, round), skinless chicken and turkey    ? Legumes, soy products, egg whites, or nuts    Limit or do not include the following in your heart healthy plan:   · Unhealthy fats and oils:      ? Whole or 2% milk, cream cheese, sour cream, or cheese    ? High-fat cuts of beef (T-bone steaks, ribs), chicken or turkey with skin, and organ meats such as liver    ? Butter, stick margarine, shortening, and cooking oils such as coconut or palm oil    · Foods and liquids high in sodium:      ? Packaged foods, such as frozen dinners, cookies, macaroni and cheese, and cereals with more than 300 mg of sodium per serving    ? Vegetables with added sodium, such as instant potatoes, vegetables with added sauces, or regular canned vegetables    ?  Cured or smoked meats, such as hot dogs, izaguirre, and sausage    ? High-sodium ketchup, barbecue sauce, salad dressing, pickles, olives, soy sauce, or miso    · Foods and liquids high in sugar:      ? Candy, cake, cookies, pies, or doughnuts    ? Soft drinks (soda), sports drinks, or sweetened tea    ? Canned or dry mixes for cakes, soups, sauces, or gravies    Other healthy heart guidelines:   · Do not smoke  Nicotine and other chemicals in cigarettes and cigars can cause lung and heart damage  Ask your healthcare provider for information if you currently smoke and need help to quit  E-cigarettes or smokeless tobacco still contain nicotine  Talk to your healthcare provider before you use these products  · Limit or do not drink alcohol as directed  Alcohol can damage your heart and raise your blood pressure  Your healthcare provider may give you specific daily and weekly limits  The general recommended limit is 1 drink a day for women 21 or older and for men 72 or older  Do not have more than 3 drinks in a day or 7 in a week  The recommended limit is 2 drinks a day for men 24to 59years of age  Do not have more than 4 drinks in a day or 14 in a week  A drink of alcohol is 12 ounces of beer, 5 ounces of wine, or 1½ ounces of liquor  · Exercise regularly  Exercise can help you maintain a healthy weight and improve your blood pressure and cholesterol levels  Regular exercise can also decrease your risk for heart problems  Ask your healthcare provider about the best exercise plan for you  Do not start an exercise program without asking your healthcare provider  Follow up with your doctor or cardiologist as directed:  Write down your questions so you remember to ask them during your visits  © Copyright [address] Information is for End User's use only and may not be sold, redistributed or otherwise used for commercial purposes   All illustrations and images included in CareNotes® are the copyrighted property of [employer] M , Inc  or [facility]  The above information is an  only  It is not intended as medical advice for individual conditions or treatments  Talk to your doctor, nurse or pharmacist before following any medical regimen to see if it is safe and effective for you  Chief Complaint     Chief Complaint   Patient presents with    Follow-up     6MM         History of Present Illness       Medical management 6 month check multiple issues  Labs are reviewed  1) diabetes-A1c seen filling elevated  Patient states has not been following diet and has had less exercise activity due to problems with his back  2) hypertension-adequate control current medication  3) hyperlipidemia-cholesterol levels were not checked with these labs  Patient continues on atorvastatin  4) obesity-patient's weight is up slightly  5) chronic back issues related to spinal stenosis-currently seeing Pain Management  Has had 2 epidural injections which may be adding to his less than adequate diabetes control  Review of Systems   Review of Systems   Constitutional: Negative for activity change, appetite change, diaphoresis and fatigue  Respiratory: Negative for cough, chest tightness, shortness of breath and wheezing  Cardiovascular: Negative for chest pain, palpitations and leg swelling  Fast or slow heart rate   Gastrointestinal: Negative for abdominal pain, blood in stool, constipation, diarrhea, nausea and vomiting  Genitourinary: Negative for difficulty urinating, dysuria, frequency and hematuria  Musculoskeletal: Positive for back pain  Negative for arthralgias, gait problem, joint swelling and myalgias  Neurological: Negative for dizziness, light-headedness and headaches  Psychiatric/Behavioral: Negative for agitation, confusion, dysphoric mood and sleep disturbance  The patient is not nervous/anxious            Current Medications       Current Outpatient Medications:     atorvastatin (LIPITOR) 10 mg tablet, TAKE 1 TABLET (10 MG TOTAL) BY MOUTH DAILY AS DIRECTED, Disp: 90 tablet, Rfl: 1    hydrochlorothiazide (HYDRODIURIL) 25 mg tablet, TAKE 1 TABLET BY MOUTH EVERY DAY, Disp: 90 tablet, Rfl: 0    metFORMIN (GLUCOPHAGE) 850 mg tablet, TAKE 1 TABLET (850 MG TOTAL) BY MOUTH 2 (TWO) TIMES A DAY, Disp: 60 tablet, Rfl: 5    Misc Natural Products (OSTEO BI-FLEX ADV TRIPLE ST) TABS, Take 1 tablet by mouth 2 (two) times a day, Disp: , Rfl:     omeprazole (PriLOSEC) 20 mg delayed release capsule, TAKE 1 CAPSULE BY MOUTH EVERY DAY, Disp: 90 capsule, Rfl: 0    quinapril (ACCUPRIL) 40 MG tablet, TAKE 1 TABLET BY MOUTH EVERY DAY, Disp: 90 tablet, Rfl: 1    Current Allergies     Allergies as of 03/08/2021    (No Known Allergies)            The following portions of the patient's history were reviewed and updated as appropriate: allergies, current medications, past family history, past medical history, past social history, past surgical history and problem list      Past Medical History:   Diagnosis Date    Diabetes mellitus ([address] )     Hypertension        Past Surgical History:   Procedure Laterality Date    NO PAST SURGERIES         Family History   Problem Relation Age of Onset    Diabetes Father     Hyperlipidemia Father     Bipolar disorder Sister         1 disorder    Other Sister         hx of suicide    Lung cancer Mother     Substance Abuse Neg Hx          Medications have been verified  Objective   /84   Pulse 78   Temp (!) 96 5 °F (35 8 °C) (Temporal)   Ht 5' 8" (1 727 m)   Wt 121 kg (267 lb 6 4 oz)   SpO2 97%   BMI 40 66 kg/m²        Physical Exam     Physical Exam  Vitals signs reviewed  Constitutional:       General: He is not in acute distress  Appearance: He is well-developed  He is not diaphoretic  HENT:      Head: Normocephalic and atraumatic        Right Ear: Tympanic membrane, ear canal and external ear normal       Left Ear: Tympanic membrane, ear canal and external ear normal       Nose: No mucosal edema or rhinorrhea  Right Sinus: No maxillary sinus tenderness  Left Sinus: No maxillary sinus tenderness  Mouth/Throat:      Mouth: Mucous membranes are not pale and not dry  Dentition: Normal dentition  Pharynx: Uvula midline  No oropharyngeal exudate  Eyes:      General:         Right eye: No discharge  Left eye: No discharge  Pupils: Pupils are equal, round, and reactive to light  Neck:      Musculoskeletal: Normal range of motion and neck supple  Thyroid: No thyromegaly  Cardiovascular:      Rate and Rhythm: Normal rate and regular rhythm  Pulses: no weak pulses          Dorsalis pedis pulses are 2+ on the right side and 2+ on the left side  Posterior tibial pulses are 2+ on the right side and 2+ on the left side  Heart sounds: Normal heart sounds  No murmur  Pulmonary:      Effort: Pulmonary effort is normal  No respiratory distress  Breath sounds: Normal breath sounds  No wheezing or rales  Musculoskeletal: Normal range of motion  General: No tenderness  Right lower leg: No edema  Left lower leg: No edema  Feet:      Right foot:      Skin integrity: No ulcer, skin breakdown, erythema, warmth, callus or dry skin  Left foot:      Skin integrity: No ulcer, skin breakdown, erythema, warmth, callus or dry skin  Lymphadenopathy:      Cervical: No cervical adenopathy  Neurological:      Mental Status: He is alert and oriented to person, place, and time  Psychiatric:         Mood and Affect: Mood normal          Behavior: Behavior normal      Patient's shoes and socks removed  Right Foot/Ankle   Right Foot Inspection  Skin Exam: skin normal and skin intact no dry skin, no warmth, no callus, no erythema, no maceration, no abnormal color, no pre-ulcer, no ulcer and no callus                            Sensory   Vibration: intact  Proprioception: intact   Monofilament testing: intact  Vascular  Capillary refills: < 3 seconds  The right DP pulse is 2+  The right PT pulse is 2+  Left Foot/Ankle  Left Foot Inspection  Skin Exam: skin normal and skin intactno dry skin, no warmth, no erythema, no maceration, normal color, no pre-ulcer, no ulcer and no callus                                         Sensory   Vibration: intact  Proprioception: intact  Monofilament: intact  Vascular  Capillary refills: < 3 seconds  The left DP pulse is 2+  The left PT pulse is 2+  Assign Risk Category:  No deformity present; No loss of protective sensation; No weak pulses       Risk: 0                BMI Counseling: Body mass index is 40 66 kg/m²  The BMI is above normal  Nutrition recommendations include reducing portion sizes, decreasing overall calorie intake and 3-5 servings of fruits/vegetables daily

## 2021-03-11 ENCOUNTER — OFFICE VISIT (OUTPATIENT)
Dept: PAIN MEDICINE | Facility: CLINIC | Age: 56
End: 2021-03-11
Payer: COMMERCIAL

## 2021-03-11 VITALS
WEIGHT: 266 LBS | BODY MASS INDEX: 40.32 KG/M2 | SYSTOLIC BLOOD PRESSURE: 134 MMHG | DIASTOLIC BLOOD PRESSURE: 84 MMHG | HEIGHT: 68 IN | TEMPERATURE: 98 F | HEART RATE: 88 BPM

## 2021-03-11 DIAGNOSIS — M54.50 CHRONIC BILATERAL LOW BACK PAIN WITHOUT SCIATICA: ICD-10-CM

## 2021-03-11 DIAGNOSIS — G89.4 CHRONIC PAIN SYNDROME: Primary | ICD-10-CM

## 2021-03-11 DIAGNOSIS — M48.062 SPINAL STENOSIS OF LUMBAR REGION WITH NEUROGENIC CLAUDICATION: ICD-10-CM

## 2021-03-11 DIAGNOSIS — M51.26 LUMBAR DISC HERNIATION: ICD-10-CM

## 2021-03-11 DIAGNOSIS — G89.29 CHRONIC BILATERAL LOW BACK PAIN WITHOUT SCIATICA: ICD-10-CM

## 2021-03-11 DIAGNOSIS — M54.16 LUMBAR RADICULOPATHY: ICD-10-CM

## 2021-03-11 PROCEDURE — 3075F SYST BP GE 130 - 139MM HG: CPT | Performed by: NURSE PRACTITIONER

## 2021-03-11 PROCEDURE — 3079F DIAST BP 80-89 MM HG: CPT | Performed by: NURSE PRACTITIONER

## 2021-03-11 PROCEDURE — 3008F BODY MASS INDEX DOCD: CPT | Performed by: NURSE PRACTITIONER

## 2021-03-11 PROCEDURE — 99214 OFFICE O/P EST MOD 30 MIN: CPT | Performed by: NURSE PRACTITIONER

## 2021-03-11 PROCEDURE — 1036F TOBACCO NON-USER: CPT | Performed by: NURSE PRACTITIONER

## 2021-03-11 RX ORDER — GABAPENTIN 300 MG/1
CAPSULE ORAL
Qty: 90 CAPSULE | Refills: 1 | Status: SHIPPED | OUTPATIENT
Start: 2021-03-11 | End: 2021-04-23 | Stop reason: SDUPTHER

## 2021-03-11 NOTE — PATIENT INSTRUCTIONS
Gabapentin (By mouth)   Gabapentin (shirlene-a-PEN-tin)  Treats seizures and pain caused by shingles  Brand Name(s): FusePaq Fanatrex, Gralise, Neurontin   There may be other brand names for this medicine  When This Medicine Should Not Be Used: This medicine is not right for everyone  Do not use it if you had an allergic reaction to gabapentin  How to Use This Medicine:   Capsule, Liquid, Tablet  · Take your medicine as directed  Your dose may need to be changed several times to find what works best for you  If you have epilepsy, do not allow more than 12 hours to pass between doses  · Capsule: Swallow the capsule whole with plenty of water  Do not open, crush, or chew it  · Gralise® tablet: Swallow the tablet whole   Do not crush, break, or chew it  · Neurontin® tablet: If you break a tablet into 2 pieces, use the second half as your next dose  Do not use the half-tablet if the whole tablet has been cut or broken after 28 days  · Oral liquid: Measure the oral liquid medicine with a marked measuring spoon, oral syringe, or medicine cup  · This medicine should come with a Medication Guide  Ask your pharmacist for a copy if you do not have one  · Missed dose: Take a dose as soon as you remember  If it is almost time for your next dose, wait until then and take a regular dose  Do not take extra medicine to make up for a missed dose  · Store the medicine in a closed container at room temperature, away from heat, moisture, and direct light  Store the Neurontin® oral liquid in the refrigerator  Do not freeze  Drugs and Foods to Avoid:   Ask your doctor or pharmacist before using any other medicine, including over-the-counter medicines, vitamins, and herbal products  · Some medicines can affect how gabapentin works  Tell your doctor if you also using hydrocodone or morphine  · If you take an antacid, wait at least 2 hours before you take gabapentin    · Do not drink alcohol while you are using this medicine  · Tell your doctor if you use anything else that makes you sleepy  Some examples are allergy medicine, narcotic pain medicine, and alcohol  Tell your doctor if you are also using lorazepam, oxycodone, or zolpidem  Warnings While Using This Medicine:   · Tell your doctor if you are pregnant or breastfeeding, or if you have kidney problems (including patients receiving dialysis) or lung problems  Tell your doctor if you have a history of depression or mental health problems  · This medicine may cause the following problems:  ? Drug reaction with eosinophilia and systemic symptoms (DRESS) or multiorgan hypersensitivity, which may damage the liver, kidney, blood, heart, or muscles  ? Changes in mood or behavior, including suicidal thoughts or behavior  ? Respiratory depression (serious breathing problem that can be life-threatening), when used with narcotic pain medicines  · Do not stop using this medicine suddenly  Your doctor will need to slowly decrease your dose before you stop it completely  · This medicine may make you dizzy or drowsy  Do not drive or do anything else that could be dangerous until you know how this medicine affects you  · Tell any doctor or dentist who treats you that you are using this medicine  This medicine may affect certain medical test results  · Your doctor will check your progress and the effects of this medicine at regular visits  Keep all appointments  · Keep all medicine out of the reach of children  Never share your medicine with anyone    Possible Side Effects While Using This Medicine:   Call your doctor right away if you notice any of these side effects:  · Allergic reaction: Itching or hives, swelling in your face or hands, swelling or tingling in your mouth or throat, chest tightness, trouble breathing  · Behavior problems, aggression, restlessness, trouble concentrating, moodiness (especially in children)  · Blistering, peeling, red skin rash  · Blue lips, fingernails, or skin, chest pain, fast heartbeat, trouble breathing  · Change in how much or how often you urinate, bloody or cloudy urine  · Dark urine or pale stools, nausea, vomiting, loss of appetite, stomach pain, yellow skin or eyes  · Fever, chills, cough, sore throat, body aches  · Problems with coordination, shakiness, unsteadiness, unusual eye movement  · Rapid weight gain, swelling in your hands, ankles, or feet  · Rash, swollen or tender glands in the neck, armpit, or groin  · Unusual moods or behaviors, thoughts of hurting yourself, feeling depressed  If you notice these less serious side effects, talk with your doctor:   · Dizziness, drowsiness, sleepiness, tiredness  If you notice other side effects that you think are caused by this medicine, tell your doctor  Call your doctor for medical advice about side effects  You may report side effects to FDA at 4-730-FDA-9917  © Copyright [facility] [address] Information is for End User's use only and may not be sold, redistributed or otherwise used for commercial purposes  The above information is an  only  It is not intended as medical advice for individual conditions or treatments  Talk to your doctor, nurse or pharmacist before following any medical regimen to see if it is safe and effective for you

## 2021-03-11 NOTE — PROGRESS NOTES
Assessment:  1  Chronic pain syndrome    2  Chronic bilateral low back pain without sciatica    3  Lumbar radiculopathy    4  Lumbar disc herniation    5  Spinal stenosis of lumbar region with neurogenic claudication        Plan:    While the patient was in the office today, I did have a thorough conversation with the patient regarding their chronic pain syndrome, symptoms, medication regimen, and treatment plan  I discussed with the patient at this point time since he has had several injections without any moderate or sustainable relief, I do not feel that it is in his best interest to proceed with any repeat injections  The patient was agreeable and verbalized an understanding  I discussed with the patient at this point time it may be in his best interest to proceed with a surgical consultation with Dr Jessica Taylor, to see what his surgical options would be  The patient was agreeable and verbalized an understanding  In the meantime, I did discuss with the patient that because I do feel there is definitely significant neuropathic component to his pain symptoms, he may benefit from a neuron membrane stabilizer such as gabapentin  I discussed with the patient the type of medication it is, how it works, and that it requires a titration process that is specific to each individual  I reviewed with the patient that it may take 3-4 weeks for the medication's effects to be noticed and that it should never be abruptly stopped  Possible side effects include but are not limited to; vertigo, lethargy, nausea, and edema of the extremities  Advised the patient to call our office if they experience any side effects  The patient verbalized an understanding  The patient will follow-up in 6-7 weeks for medication prescription refill and reevaluation  The patient was advised to contact the office should their symptoms worsen in the interim  The patient was agreeable and verbalized an understanding  History of Present Illness: The patient is a 63 y o  male last seen on 2/22/2021 who presents for a follow up office visit in regards to Chronic pain syndrome secondary to a lumbar disc herniation with stenosis and radiculopathy  The patient currently reports that since his last office visit as he is status post a repeat L5-S1 interlaminar lumbar epidural steroid injection on February 22, 2021 with Dr Spencer Powers, he is noted very minimal overall relief and at best noted moderate relief for 3-4 days  The patient reports that his lower extremity radicular symptoms may slightly have improved from the injections, but nothing significant and continues with the posterior radicular symptoms in an L5-S1 dermatome distribution  The patient presents today for re-evaluation and to discuss his treatment plan options  I have personally reviewed and/or updated the patient's past medical history, past surgical history, family history, social history, current medications, allergies, and vital signs today  Review of Systems:    Review of Systems   Respiratory: Negative for shortness of breath  Cardiovascular: Negative for chest pain  Gastrointestinal: Negative for constipation, diarrhea, nausea and vomiting  Musculoskeletal: Positive for gait problem  Negative for arthralgias, joint swelling and myalgias  Skin: Negative for rash  Neurological: Negative for dizziness, seizures and weakness  All other systems reviewed and are negative          Past Medical History:   Diagnosis Date    Diabetes mellitus ([address] )     Hypertension        Past Surgical History:   Procedure Laterality Date    NO PAST SURGERIES         Family History   Problem Relation Age of Onset    Diabetes Father     Hyperlipidemia Father     Bipolar disorder Sister         1 disorder    Other Sister         hx of suicide    Lung cancer Mother     Substance Abuse Neg Hx        Social History     Occupational History     Comment: full time Tobacco Use    Smoking status: Former Smoker     Types: Cigarettes     Quit date:      Years since quittin 2    Smokeless tobacco: Never Used   Substance and Sexual Activity    Alcohol use: Yes     Frequency: 2-4 times a month     Drinks per session: 3 or 4     Binge frequency: Never    Drug use: No    Sexual activity: Yes     Partners: Female         Current Outpatient Medications:     atorvastatin (LIPITOR) 10 mg tablet, TAKE 1 TABLET (10 MG TOTAL) BY MOUTH DAILY AS DIRECTED, Disp: 90 tablet, Rfl: 1    hydrochlorothiazide (HYDRODIURIL) 25 mg tablet, TAKE 1 TABLET BY MOUTH EVERY DAY, Disp: 90 tablet, Rfl: 0    metFORMIN (GLUCOPHAGE) 850 mg tablet, TAKE 1 TABLET (850 MG TOTAL) BY MOUTH 2 (TWO) TIMES A DAY, Disp: 60 tablet, Rfl: 5    Misc Natural Products (OSTEO BI-FLEX ADV TRIPLE ST) TABS, Take 1 tablet by mouth 2 (two) times a day, Disp: , Rfl:     omeprazole (PriLOSEC) 20 mg delayed release capsule, TAKE 1 CAPSULE BY MOUTH EVERY DAY, Disp: 90 capsule, Rfl: 0    quinapril (ACCUPRIL) 40 MG tablet, TAKE 1 TABLET BY MOUTH EVERY DAY, Disp: 90 tablet, Rfl: 1    gabapentin (NEURONTIN) 300 mg capsule, Take 1 PO HS x 1 week, then 1 PO BID x 1 week, then 1 PO TID , Disp: 90 capsule, Rfl: 1    No Known Allergies    Physical Exam:    /84 (BP Location: Left arm, Patient Position: Sitting, Cuff Size: Standard)   Pulse 88   Temp 98 °F (36 7 °C)   Ht 5' 8" (1 727 m)   Wt 121 kg (266 lb)   BMI 40 45 kg/m²     Constitutional:normal, well developed, well nourished, alert, in no distress and non-toxic and no overt pain behavior    Eyes:anicteric  HEENT:grossly intact  Neck:supple, symmetric, trachea midline and no masses   Pulmonary:even and unlabored  Cardiovascular:No edema or pitting edema present  Skin:Normal without rashes or lesions and well hydrated  Psychiatric:Mood and affect appropriate  Neurologic:Cranial Nerves II-XII grossly intact  Musculoskeletal:The patient's gait is slightly antalgic, but steady without the use of any assistive devices        Imaging  No orders to display         Orders Placed This Encounter   Procedures    Ambulatory referral to Neurosurgery

## 2021-03-15 NOTE — TELEPHONE ENCOUNTER
Rain Hercules from [facility] at Dr Kamilah Archuleta office called in stating that Dr Wayne Bonds is requesting that the patient have an MRI with & without contrast of the lumbar spine using stacked axial images   Thank you

## 2021-03-15 NOTE — TELEPHONE ENCOUNTER
S/w pt, advised of above  Pt verbalized understanding and appreciation  SL CS phone number provided

## 2021-04-01 ENCOUNTER — HOSPITAL ENCOUNTER (OUTPATIENT)
Dept: MRI IMAGING | Facility: HOSPITAL | Age: 56
Discharge: HOME | End: 2021-04-01
Payer: COMMERCIAL

## 2021-04-01 DIAGNOSIS — M54.50 CHRONIC BILATERAL LOW BACK PAIN WITHOUT SCIATICA: ICD-10-CM

## 2021-04-01 DIAGNOSIS — M51.26 LUMBAR DISC HERNIATION: ICD-10-CM

## 2021-04-01 DIAGNOSIS — G89.4 CHRONIC PAIN SYNDROME: ICD-10-CM

## 2021-04-01 DIAGNOSIS — G89.29 CHRONIC BILATERAL LOW BACK PAIN WITHOUT SCIATICA: ICD-10-CM

## 2021-04-01 DIAGNOSIS — M48.062 SPINAL STENOSIS OF LUMBAR REGION WITH NEUROGENIC CLAUDICATION: ICD-10-CM

## 2021-04-01 PROCEDURE — G1004 CDSM NDSC: HCPCS

## 2021-04-01 PROCEDURE — A9585 GADOBUTROL INJECTION: HCPCS | Performed by: NURSE PRACTITIONER

## 2021-04-01 PROCEDURE — 72158 MRI LUMBAR SPINE W/O & W/DYE: CPT

## 2021-04-01 RX ADMIN — GADOBUTROL 12 ML: 604.72 INJECTION INTRAVENOUS at 07:24

## 2021-04-05 ENCOUNTER — TELEPHONE (OUTPATIENT)
Dept: PAIN MEDICINE | Facility: CLINIC | Age: 56
End: 2021-04-05

## 2021-04-05 NOTE — TELEPHONE ENCOUNTER
Can you please call the patient and advise him that his lumbar MRI showed mild to mod, but stable deg changes/OA with a small disc herniation at L3-4 with mild central stenosis and Right greater than left foraminal stenosis and moderate B/L foraminal stenosis at L4-5  I would recommend he proceed with the Surgical evaluation with Dr June Brennan and the gabapentin as we discussed at his last OV  He should f/u as scheduled  Thank you

## 2021-04-06 NOTE — TELEPHONE ENCOUNTER
S/w pt, advised of above  Pt verbalized understanding and appreciation  Will makenna w/ Dr Gabi Delgado

## 2021-04-23 ENCOUNTER — OFFICE VISIT (OUTPATIENT)
Dept: PAIN MEDICINE | Facility: CLINIC | Age: 56
End: 2021-04-23
Payer: COMMERCIAL

## 2021-04-23 VITALS
WEIGHT: 263 LBS | HEART RATE: 74 BPM | SYSTOLIC BLOOD PRESSURE: 132 MMHG | HEIGHT: 68 IN | TEMPERATURE: 97.8 F | DIASTOLIC BLOOD PRESSURE: 82 MMHG | BODY MASS INDEX: 39.86 KG/M2

## 2021-04-23 DIAGNOSIS — M54.16 LUMBAR RADICULOPATHY: ICD-10-CM

## 2021-04-23 DIAGNOSIS — M48.062 SPINAL STENOSIS OF LUMBAR REGION WITH NEUROGENIC CLAUDICATION: ICD-10-CM

## 2021-04-23 DIAGNOSIS — M51.26 LUMBAR DISC HERNIATION: ICD-10-CM

## 2021-04-23 DIAGNOSIS — G89.4 CHRONIC PAIN SYNDROME: Primary | ICD-10-CM

## 2021-04-23 DIAGNOSIS — G89.29 CHRONIC BILATERAL LOW BACK PAIN WITHOUT SCIATICA: ICD-10-CM

## 2021-04-23 DIAGNOSIS — M54.50 CHRONIC BILATERAL LOW BACK PAIN WITHOUT SCIATICA: ICD-10-CM

## 2021-04-23 PROCEDURE — 1036F TOBACCO NON-USER: CPT | Performed by: NURSE PRACTITIONER

## 2021-04-23 PROCEDURE — 3079F DIAST BP 80-89 MM HG: CPT | Performed by: NURSE PRACTITIONER

## 2021-04-23 PROCEDURE — 3008F BODY MASS INDEX DOCD: CPT | Performed by: NURSE PRACTITIONER

## 2021-04-23 PROCEDURE — 3075F SYST BP GE 130 - 139MM HG: CPT | Performed by: NURSE PRACTITIONER

## 2021-04-23 PROCEDURE — 99214 OFFICE O/P EST MOD 30 MIN: CPT | Performed by: NURSE PRACTITIONER

## 2021-04-23 RX ORDER — GABAPENTIN 300 MG/1
CAPSULE ORAL
Qty: 120 CAPSULE | Refills: 1 | Status: SHIPPED | OUTPATIENT
Start: 2021-04-23 | End: 2021-06-18 | Stop reason: SDUPTHER

## 2021-04-23 NOTE — PROGRESS NOTES
Assessment:  1  Chronic pain syndrome    2  Chronic bilateral low back pain without sciatica    3  Lumbar radiculopathy    4  Lumbar disc herniation    5  Spinal stenosis of lumbar region with neurogenic claudication        Plan:   While the patient was in the office today, I did have a thorough conversation with the patient regarding their chronic pain syndrome, symptoms, medication regimen, and treatment plan  I encouraged the patient to follow up with his surgical consultation as scheduled in May  The patient was agreeable and verbalized an understanding  With regards to his medication regimen, since he is on a subtherapeutic dose of gabapentin and is noting some improvement, without side effects, I feel would be beneficial to further titrate the dose of gabapentin to 1200 mg a day for the next 2 months and see how he does  I advised the patient that if they experience any side effects or issues with the changes in their medication regiment, they should give our office a call to discuss  I also advised the patient not to drive or operate machinery until they see how the changes in the medication regimen affects them  The patient was agreeable and verbalized an understanding  The patient will follow-up in 8 weeks for medication prescription refill and reevaluation  The patient was advised to contact the office should their symptoms worsen in the interim  The patient was agreeable and verbalized an understanding  History of Present Illness: The patient is a 63 y o  male last seen on 3/11/2021 who presents for a follow up office visit in regards to Chronic pain syndrome secondary to herniated lumbar disc with stenosis and radiculopathy  The patient currently reports that since his last office visit he does feel there has been some improvement in his low back and leg pain symptoms as he did start the gabapentin as prescribed    He did proceed with an updated MRI which did show continued degenerative changes and stenosis and is scheduled for a surgical consultation in May with Dr Moses Case  He presents today to discuss his medication regimen and treatment plan  Current pain medications includes:    Gabapentin 300 mg t i d  The patient reports that this regimen is providing 30% pain relief  The patient is reporting no side effects from this pain medication regimen  I have personally reviewed and/or updated the patient's past medical history, past surgical history, family history, social history, current medications, allergies, and vital signs today  Review of Systems:    Review of Systems   Respiratory: Negative for shortness of breath  Cardiovascular: Negative for chest pain  Gastrointestinal: Negative for constipation, diarrhea, nausea and vomiting  Musculoskeletal: Positive for gait problem  Negative for arthralgias, joint swelling and myalgias  Skin: Negative for rash  Neurological: Negative for dizziness, seizures and weakness  All other systems reviewed and are negative          Past Medical History:   Diagnosis Date    Diabetes mellitus [address] [address]     Hypertension        Past Surgical History:   Procedure Laterality Date    NO PAST SURGERIES         Family History   Problem Relation Age of Onset    Diabetes Father     Hyperlipidemia Father     Bipolar disorder Sister         1 disorder    Other Sister         hx of suicide    Lung cancer Mother     Substance Abuse Neg Hx        Social History     Occupational History     Comment: full time   Tobacco Use    Smoking status: Former Smoker     Types: Cigarettes     Quit date:      Years since quittin 3    Smokeless tobacco: Never Used   Substance and Sexual Activity    Alcohol use: Yes     Frequency: 2-4 times a month     Drinks per session: 3 or 4     Binge frequency: Never    Drug use: No    Sexual activity: Yes     Partners: Female         Current Outpatient Medications:     atorvastatin (LIPITOR) 10 mg tablet, TAKE 1 TABLET (10 MG TOTAL) BY MOUTH DAILY AS DIRECTED, Disp: 90 tablet, Rfl: 1    gabapentin (NEURONTIN) 300 mg capsule, Take 1 PO QID, Disp: 120 capsule, Rfl: 1    hydrochlorothiazide (HYDRODIURIL) 25 mg tablet, TAKE 1 TABLET BY MOUTH EVERY DAY, Disp: 90 tablet, Rfl: 0    metFORMIN (GLUCOPHAGE) 850 mg tablet, TAKE 1 TABLET (850 MG TOTAL) BY MOUTH 2 (TWO) TIMES A DAY, Disp: 60 tablet, Rfl: 5    Misc Natural Products (OSTEO BI-FLEX ADV TRIPLE ST) TABS, Take 1 tablet by mouth 2 (two) times a day, Disp: , Rfl:     omeprazole (PriLOSEC) 20 mg delayed release capsule, TAKE 1 CAPSULE BY MOUTH EVERY DAY, Disp: 90 capsule, Rfl: 0    quinapril (ACCUPRIL) 40 MG tablet, TAKE 1 TABLET BY MOUTH EVERY DAY, Disp: 90 tablet, Rfl: 1    No Known Allergies    Physical Exam:    /82 (BP Location: Left arm, Patient Position: Sitting, Cuff Size: Standard)   Pulse 74   Temp 97 8 °F (36 6 °C)   Ht 5' 8" (1 727 m)   Wt 119 kg (263 lb)   BMI 39 99 kg/m²     Constitutional:normal, well developed, well nourished, alert, in no distress and non-toxic and no overt pain behavior  Eyes:anicteric  HEENT:grossly intact  Neck:supple, symmetric, trachea midline and no masses   Pulmonary:even and unlabored  Cardiovascular:No edema or pitting edema present  Skin:Normal without rashes or lesions and well hydrated  Psychiatric:Mood and affect appropriate  Neurologic:Cranial Nerves II-XII grossly intact  Musculoskeletal:The patient's gait is slightly antalgic, but steady without the use of any assistive devices  Imaging  No orders to display         No orders of the defined types were placed in this encounter

## 2021-04-27 DIAGNOSIS — E11.69 HYPERLIPIDEMIA ASSOCIATED WITH TYPE 2 DIABETES MELLITUS (HCC): ICD-10-CM

## 2021-04-27 DIAGNOSIS — I10 HYPERTENSION, UNSPECIFIED TYPE: ICD-10-CM

## 2021-04-27 DIAGNOSIS — E78.5 HYPERLIPIDEMIA ASSOCIATED WITH TYPE 2 DIABETES MELLITUS (HCC): ICD-10-CM

## 2021-04-28 PROCEDURE — 4010F ACE/ARB THERAPY RXD/TAKEN: CPT | Performed by: NURSE PRACTITIONER

## 2021-04-28 RX ORDER — QUINAPRIL 40 MG/1
TABLET ORAL
Qty: 90 TABLET | Refills: 1 | Status: SHIPPED | OUTPATIENT
Start: 2021-04-28 | End: 2021-12-15

## 2021-04-28 RX ORDER — ATORVASTATIN CALCIUM 10 MG/1
10 TABLET, FILM COATED ORAL DAILY
Qty: 90 TABLET | Refills: 1 | Status: SHIPPED | OUTPATIENT
Start: 2021-04-28 | End: 2021-12-15

## 2021-05-18 ENCOUNTER — CONSULT (OUTPATIENT)
Dept: NEUROSURGERY | Facility: CLINIC | Age: 56
End: 2021-05-18
Payer: COMMERCIAL

## 2021-05-18 VITALS
BODY MASS INDEX: 39.4 KG/M2 | RESPIRATION RATE: 16 BRPM | TEMPERATURE: 97.3 F | HEART RATE: 84 BPM | SYSTOLIC BLOOD PRESSURE: 174 MMHG | DIASTOLIC BLOOD PRESSURE: 106 MMHG | HEIGHT: 68 IN | WEIGHT: 260 LBS

## 2021-05-18 DIAGNOSIS — M54.50 CHRONIC BILATERAL LOW BACK PAIN WITHOUT SCIATICA: ICD-10-CM

## 2021-05-18 DIAGNOSIS — M48.062 SPINAL STENOSIS OF LUMBAR REGION WITH NEUROGENIC CLAUDICATION: ICD-10-CM

## 2021-05-18 DIAGNOSIS — M51.26 LUMBAR DISC HERNIATION: ICD-10-CM

## 2021-05-18 DIAGNOSIS — G89.29 CHRONIC BILATERAL LOW BACK PAIN WITHOUT SCIATICA: ICD-10-CM

## 2021-05-18 DIAGNOSIS — M54.16 LUMBAR RADICULOPATHY: ICD-10-CM

## 2021-05-18 PROCEDURE — 99243 OFF/OP CNSLTJ NEW/EST LOW 30: CPT | Performed by: NEUROLOGICAL SURGERY

## 2021-05-18 NOTE — PROGRESS NOTES
DISCUSSION SUMMARY  This is a 63 y o   who has had increasing difficulty with back pain  He has been off of work since October and has gotten worse in the interval that he has been off  I recommended that he undergo a facet injection at the L3-4 level coupled with physical therapy  If this is ineffective for him then a surgical decompression at the L3-4 level could alleviate his spinal stenosis  He would still have his facet arthropathy and severe degenerative disc disease as pain generator is but this could help especially given the fact that he uses an inversion table and forward flexion to alleviate some of his most intense pain  He promise to keep us up-to-date with how he fares  Return if symptoms worsen or fail to improve  Diagnosis ICD-10-CM Associated Orders   1  Chronic bilateral low back pain without sciatica  M54 5 Ambulatory referral to Neurosurgery    G89 29 Ambulatory referral to Pain Management     Ambulatory referral to Physical Therapy   2  Lumbar disc herniation  M51 26 Ambulatory referral to Neurosurgery     Ambulatory referral to Pain Management     Ambulatory referral to Physical Therapy   3  Spinal stenosis of lumbar region with neurogenic claudication  M48 062 Ambulatory referral to Neurosurgery     Ambulatory referral to Pain Management     Ambulatory referral to Physical Therapy   4  Lumbar radiculopathy  M54 16 Ambulatory referral to Neurosurgery     Ambulatory referral to Pain Management     Ambulatory referral to Physical Therapy          Chief Complaint   Patient presents with   [address] [place] [address] patient with MRI Lspine 10/8/20 and 4/1/21 for LBP       HPI: Back Pain  This is a chronic problem  Episode onset: worse in the past year  The problem occurs constantly  The problem is unchanged  The pain is present in the lumbar spine and sacro-iliac  The symptoms are aggravated by bending, position, standing and twisting   Associated symptoms include leg pain (more like muscle spasms), numbness (right thigh at times), tingling (right thigh at times) and weakness (bilateral legs at times)  He has tried chiropractic manipulation (epidural injection x2) for the symptoms  Improvement on treatment: firbst injection helped for a couple days but second injection id not provide any relief  Patient is a  who has had increased difficulties in maintaining activities with severe low back pain  The pain is associated with tingling and weakness and numbness of both legs right slightly worse and more commonly than left  He was not able to work  He has last worked in October  Is typical for him not to work in the winter time however he is having increasing difficulties in thinking about even returning to work  He inquired about short-term disability  Review of Systems   Constitutional: Negative  HENT: Negative  Eyes: Negative  Respiratory: Negative  Cardiovascular: Negative  Gastrointestinal: Negative  Endocrine: Negative  Genitourinary: Negative  Musculoskeletal: Positive for back pain (LBP into the sacral at times and down to hips) and gait problem (occasional)  Skin: Negative  Allergic/Immunologic: Negative  Neurological: Positive for tingling (right thigh at times), weakness (bilateral legs at times) and numbness (right thigh at times)  Hematological: Negative  Psychiatric/Behavioral: Negative  All other systems reviewed and are negative          Vitals:    BP (!) 174/106 (BP Location: Right arm, Patient Position: Sitting, Cuff Size: Standard)   Pulse 84   Temp (!) 97 3 °F (36 3 °C) (Probe)   Resp 16   Ht 5' 8" (1 727 m)   Wt 118 kg (260 lb)   BMI 39 53 kg/m²       MEDICAL HISTORY  Past Medical History:   Diagnosis Date    Diabetes mellitus ([address] )     Hypertension     Low back pain      Past Surgical History:   Procedure Laterality Date    NO PAST SURGERIES       Social History     Tobacco Use    Smoking status: Former Smoker     Types: Cigarettes     Quit date:      Years since quittin 3    Smokeless tobacco: Never Used   Substance Use Topics    Alcohol use: Yes     Frequency: 2-4 times a month     Drinks per session: 3 or 4     Binge frequency: Never    Drug use: No        Current Outpatient Medications:     atorvastatin (LIPITOR) 10 mg tablet, TAKE 1 TABLET (10 MG TOTAL) BY MOUTH DAILY AS DIRECTED, Disp: 90 tablet, Rfl: 1    gabapentin (NEURONTIN) 300 mg capsule, Take 1 PO QID, Disp: 120 capsule, Rfl: 1    hydrochlorothiazide (HYDRODIURIL) 25 mg tablet, TAKE 1 TABLET BY MOUTH EVERY DAY, Disp: 90 tablet, Rfl: 0    metFORMIN (GLUCOPHAGE) 850 mg tablet, TAKE 1 TABLET (850 MG TOTAL) BY MOUTH 2 (TWO) TIMES A DAY, Disp: 60 tablet, Rfl: 5    Misc Natural Products (OSTEO BI-FLEX ADV TRIPLE ST) TABS, Take 1 tablet by mouth daily , Disp: , Rfl:     omeprazole (PriLOSEC) 20 mg delayed release capsule, TAKE 1 CAPSULE BY MOUTH EVERY DAY, Disp: 90 capsule, Rfl: 0    quinapril (ACCUPRIL) 40 MG tablet, TAKE 1 TABLET BY MOUTH EVERY DAY, Disp: 90 tablet, Rfl: 1   No Known Allergies     The following portions of the patient's history were updated by MA and reviewed by MD: allergies, current medications, past family history, past medical history, past social history, past surgical history and problem list       Physical Exam  Vitals signs and nursing note reviewed  Constitutional:       General: He is not in acute distress  Appearance: Normal appearance  He is normal weight  He is not ill-appearing, toxic-appearing or diaphoretic  HENT:      Head: Normocephalic and atraumatic  Nose: Nose normal    Eyes:      Extraocular Movements: Extraocular movements intact  Pupils: Pupils are equal, round, and reactive to light  Neck:      Musculoskeletal: Normal range of motion and neck supple  Musculoskeletal:         General: No swelling, tenderness, deformity or signs of injury  Right lower leg: No edema  Left lower leg: No edema  Comments: He reports pain with movement especially with extension of his back  Flexion causes the pain to go away  He is slow to move and slow to have transitions  Skin:     General: Skin is warm and dry  Neurological:      Mental Status: He is alert and oriented to person, place, and time  Mental status is at baseline  Cranial Nerves: No cranial nerve deficit  Sensory: Sensory deficit ( patchy nonradicular sensory changes) present  Motor: No weakness  Coordination: Coordination normal       Gait: Gait abnormal ( His tandem gait is normal however it is slow secondary to pain with rapid movements  )  Deep Tendon Reflexes: Reflexes normal    Psychiatric:         Mood and Affect: Mood normal          Behavior: Behavior normal          Thought Content: Thought content normal          Judgment: Judgment normal            RESULTS/DATA  I have personally reviewed pertinent films in PACS   MRI of the lumbosacral spine from October of 2020 is compared with a study from April of 2021  This demonstrates is spinal stenosis at the L3-4 level with severe degenerative disc disease and Schmorl's node abnormality in the endplate of 4  There is redundancy of the nerve roots which is slightly worse than it was in October  There is a disc herniation which is central at the L3-4 level which contributes to the spinal stenotic regions  There is signs of severe degenerative disc disease at multiple levels in the spine with anterior bridging osteophytes

## 2021-05-25 ENCOUNTER — OFFICE VISIT (OUTPATIENT)
Dept: PAIN MEDICINE | Facility: CLINIC | Age: 56
End: 2021-05-25
Payer: COMMERCIAL

## 2021-05-25 VITALS
BODY MASS INDEX: 40.01 KG/M2 | HEIGHT: 68 IN | DIASTOLIC BLOOD PRESSURE: 82 MMHG | WEIGHT: 264 LBS | SYSTOLIC BLOOD PRESSURE: 138 MMHG | TEMPERATURE: 97.3 F | HEART RATE: 74 BPM

## 2021-05-25 DIAGNOSIS — M48.062 SPINAL STENOSIS OF LUMBAR REGION WITH NEUROGENIC CLAUDICATION: ICD-10-CM

## 2021-05-25 DIAGNOSIS — M47.816 LUMBAR SPONDYLOSIS: Primary | ICD-10-CM

## 2021-05-25 PROCEDURE — 99214 OFFICE O/P EST MOD 30 MIN: CPT | Performed by: ANESTHESIOLOGY

## 2021-05-25 PROCEDURE — 3079F DIAST BP 80-89 MM HG: CPT | Performed by: ANESTHESIOLOGY

## 2021-05-25 PROCEDURE — 3075F SYST BP GE 130 - 139MM HG: CPT | Performed by: ANESTHESIOLOGY

## 2021-05-25 NOTE — PROGRESS NOTES
Assessment  1  Lumbar spondylosis    2  Spinal stenosis of lumbar region with neurogenic claudication        Plan    The patient's low back pain persists despite time, relative rest, activity modification and therapy  Based on the patient's symptoms and examination, I suspect that his pain is being generated by the lumbar facet joints  The facet joints are only one of many possible low back pain generators  Unfortunately, studies have demonstrated that history and examination alone are unreliable  We will schedule the patient for diagnostic lumbar medial branch blockade using a double block paradigm  If the patient receives significant pain relief of appropriate duration with bupivicaine 0 25%, we will confirm with bupivicaine 0 75%  If the patient demonstrates appropriate response to medial branch blockade we will schedule for radiofrequency ablation of the blocked nerves to provide long-term pain relief  In the office today, we reviewed the nature of the patient's pathology in depth using  diagrams and models  We discussed the approach we would use for the medial branch block and provided literature for home review  The patient understands the risks associated with the procedure including bleeding, infection, tissue injury, allergic reaction and paralysis and provided written and verbal consent  in the office today  My impressions and treatment recommendations were discussed in detail with the patient who verbalized understanding and had no further questions  Discharge instructions were provided  I personally saw and examined the patient and I agree with the above discussed plan of care  Orders Placed This Encounter   Procedures    FL spine and pain procedure     Standing Status:   Future     Standing Expiration Date:   5/25/2025     Order Specific Question:   Reason for Exam:     Answer:   Bilateral L3,4,5 MBB with 0 25% bupiv     Order Specific Question:   Anticoagulant hold needed? Answer:   no         History of Present Illness    Dakota Lehman is a 63 y o  male who I have seen in the past underwent epidural steroid injections with short-term relief  He recently had a neuro surgical evaluation and was being sent for re-evaluation for the possibility of facet treatment  He is also restarting physical therapy  His pain is across his low back worse in the morning describes intermittent achy sharp pressure-like with a numbing sensation  He denies true radicular symptoms however lumbar extension does reproduce his symptoms  Standing and walking aggravate his pain  He denies bowel or bladder dysfunction but his pain significant interferes with daily living activities  I have personally reviewed and/or updated the patient's past medical history, past surgical history, family history, social history, current medications, allergies, and vital signs today  Review of Systems   Constitutional: Negative for fever and unexpected weight change  HENT: Negative for trouble swallowing  Eyes: Negative for visual disturbance  Respiratory: Negative for shortness of breath and wheezing  Cardiovascular: Negative for chest pain and palpitations  Gastrointestinal: Negative for constipation, diarrhea, nausea and vomiting  Endocrine: Negative for cold intolerance, heat intolerance and polydipsia  Genitourinary: Negative for difficulty urinating and frequency  Musculoskeletal: Positive for gait problem  Negative for arthralgias, joint swelling and myalgias  Skin: Negative for rash  Neurological: Negative for dizziness, seizures, syncope, weakness and headaches  Hematological: Does not bruise/bleed easily  Psychiatric/Behavioral: Negative for dysphoric mood  All other systems reviewed and are negative        Patient Active Problem List   Diagnosis    Type 2 diabetes mellitus without complication, without long-term current use of insulin (HCC)    Benign essential hypertension    [facility] associated with type [facility]    Peptic reflux esophagitis    Morbid obesity ([facility] )    Spinal stenosis of lumbar region with neurogenic claudication    Lumbar disc herniation    Chronic bilateral low back pain without sciatica    Chronic pain syndrome    Lumbar radiculopathy       Past Medical History:   Diagnosis Date    Diabetes mellitus ([facility] )     Hypertension     Low back pain        Past Surgical History:   Procedure Laterality Date    NO PAST SURGERIES         Family History   Problem Relation Age of Onset    Diabetes Father     Hyperlipidemia Father     Bipolar disorder Sister         1 disorder    Other Sister         hx of suicide    Lung cancer Mother     Substance Abuse Neg Hx        Social History     Occupational History     Comment: full time   Tobacco Use    Smoking status: Former Smoker     Types: Cigarettes     Quit date:      Years since quittin 4    Smokeless tobacco: Never Used   Substance and Sexual Activity    Alcohol use: Yes     Frequency: 2-4 times a month     Drinks per session: 3 or 4     Binge frequency: Never    Drug use: No    Sexual activity: Yes     Partners: Female       Current Outpatient Medications on File Prior to Visit   Medication Sig    atorvastatin (LIPITOR) 10 mg tablet TAKE 1 TABLET (10 MG TOTAL) BY MOUTH DAILY AS DIRECTED    gabapentin (NEURONTIN) 300 mg capsule Take 1 PO QID    hydrochlorothiazide (HYDRODIURIL) 25 mg tablet TAKE 1 TABLET BY MOUTH EVERY DAY    metFORMIN (GLUCOPHAGE) 850 mg tablet TAKE 1 TABLET (850 MG TOTAL) BY MOUTH 2 (TWO) TIMES A DAY    Misc Natural Products (OSTEO BI-FLEX ADV TRIPLE ST) TABS Take 1 tablet by mouth daily     omeprazole (PriLOSEC) 20 mg delayed release capsule TAKE 1 CAPSULE BY MOUTH EVERY DAY    quinapril (ACCUPRIL) 40 MG tablet TAKE 1 TABLET BY MOUTH EVERY DAY     No current facility-administered medications on file prior to visit          No Known Allergies    Physical Exam    /82 (BP Location: Left arm, Patient Position: Sitting, Cuff Size: Standard)   Pulse 74   Temp (!) 97 3 °F (36 3 °C)   Ht 5' 8" (1 727 m)   Wt 120 kg (264 lb)   BMI 40 14 kg/m²     Constitutional: normal, well developed, well nourished, alert, in no distress and non-toxic and no overt pain behavior  and obese  Eyes: anicteric  HEENT: grossly intact  Neck: supple, symmetric, trachea midline and no masses   Pulmonary:even and unlabored  Cardiovascular:No edema or pitting edema present  Skin:Normal without rashes or lesions and well hydrated  Psychiatric:Mood and affect appropriate  Neurologic:Cranial Nerves II-XII grossly intact  Musculoskeletal:normal,   Inspection:  Normal station and gait  Normal lumbar lordotic curve with no significant scoliosis or spinal step-off  Skin intact without erythema  No gross mass or muscle atrophy  Palpation:  There is no tenderness to palpation overlying the sacroiliac joint as well as the ischial bursa bilateral   No significant tenderness over the greater trochanteric bursa bilaterally  Motor/Strength:  5/5 strength in the bilateral lower limbs  The patient is able to heel and/toe walk  Tandem gait is intact  Reflexes: Deep tendon reflex are diminished but symmetrical bilateral patellar and Achilles  Sensation:   Sensation intact to light touch and pinprick in the bilateral lower limbs  Proprioception is intact at bilateral hallux  Maneuvers: Negative bilateral straight leg raising  Negative Jhon's maneuver  Positive pain with lumbar extension and positive lumbar facet loading  Imaging  MRI LUMBAR SPINE WITH AND WITHOUT CONTRAST     INDICATION: M51 26: Other intervertebral disc displacement, lumbar region  M54 5: Low back pain  G89 29:  Other chronic pain  M48 062: Spinal stenosis, lumbar region with neurogenic claudication  G89 4: Chronic pain syndrome      COMPARISON:  Prior outside MRI October 8, 2020     TECHNIQUE:  Sagittal T1, sagittal T2, sagittal inversion recovery, axial T1 and axial T2, coronal T2  Sagittal and axial T1 postcontrast      IV Contrast:  12 mL of Gadobutrol injection (SINGLE-DOSE)      IMAGE QUALITY:  Diagnostic     FINDINGS:     VERTEBRAL BODIES:  There are 5 lumbar type vertebral bodies  Levoscoliosis apex L4  Modic type I degenerative marrow signal L3-4  There are Schmorl's nodes identified at L3 and L4      SACRUM:  Normal signal within the sacrum  No evidence of insufficiency or stress fracture      DISTAL CORD AND CONUS:  Normal size and signal within the distal cord and conus      PARASPINAL SOFT TISSUES:  Paraspinal soft tissues are unremarkable      LOWER THORACIC DISC SPACES:  Normal disc height and signal   No disc herniation, canal stenosis or foraminal narrowing      LUMBAR DISC SPACES:     L1-L2:  No significant central or foraminal narrowing      L2-L3:  No significant central or foraminal narrowing  Mild facet hypertrophy      L3-L4:  Moderate facet hypertrophy  There is diffuse annular bulging with marginal osteophytes and a small central protrusion type disc herniation  There is mild central stenosis and moderate bilateral foraminal narrowing      L4-L5:  Severe facet hypertrophy identified bilaterally  There is mild annular bulging  Moderate bilateral foraminal narrowing is seen  Central canal patent      L5-S1:  Moderate facet hypertrophy  No significant central or foraminal narrowing        POSTCONTRAST IMAGING:  No abnormal enhancement      IMPRESSION:     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 where central protrusion type disc herniation is noted along with facet hypertrophy which combine to result in mild central stenosis  There is moderate right greater than left bilateral foraminal   narrowing noted      Moderate bilateral foraminal narrowing noted L4-5    Degenerative changes elsewhere as described        I have personally reviewed pertinent films in PACS and my interpretation is Multilevel lumbar spondylosis with spinal stenosis

## 2021-05-25 NOTE — PATIENT INSTRUCTIONS
Lumbar Facet Block   WHAT YOU NEED TO KNOW:   What do I need to know about a lumbar facet block? A lumbar facet block is a procedure used to decrease inflammation in your lower spine  Medicines are injected at facet joints in your lower back  Facet joints are found at the back of each vertebrae  How do I prepare for the procedure? · Your healthcare provider will talk to you about how to prepare for your procedure  He or she may tell you not to eat or drink anything after midnight on the day of your procedure  Arrange to have someone drive you home after the procedure  · Tell your provider about all the medicines you currently take  He or she will tell you if you need to stop any medicine before the procedure, and when to stop  He or she will tell you what medicines to take or not take on the day of your procedure  · Your provider will also talk to you about your regular pain medicines  He or she may want you to wait a certain amount of time after the procedure before you start them again  This will help him or her see if the facet block worked for you  · You may need blood or urine tests before your procedure  You may also need x-rays, a CT scan, or an MRI  Tell your healthcare provider if you have ever had an allergic reaction to contrast liquid  Do not enter the MRI room with anything metal  Metal can cause serious damage  Tell the provider if you have any metal in or on your body  What will happen during the procedure? · You will lie on your stomach, with your body slightly turned to the side  A pillow may be placed under your abdomen, or you may be asked to bend one or both knees  · A needle will be inserted into the facet joint in your lower back  Your surgeon may use contrast liquid with an x-ray or CT to help guide the needle  He or she will inject medicines, such as steroids, to decrease inflammation  What should I expect after the procedure?   You will be taken to a room to rest until you are fully awake  You will be monitored closely for any problems  Do not get out of bed until your healthcare provider says it is okay  Your provider may have you move the area to see if you still have pain  You may then be able to go home  What are the risks of a lumbar facet block? You may bleed more than expected or get an infection  Nerves, blood vessels, or muscles may be damaged  You may have numbness in other areas  You may still have lower back or leg pain  CARE AGREEMENT:   You have the right to help plan your care  Learn about your health condition and how it may be treated  Discuss treatment options with your healthcare providers to decide what care you want to receive  You always have the right to refuse treatment  The above information is an  only  It is not intended as medical advice for individual conditions or treatments  Talk to your doctor, nurse or pharmacist before following any medical regimen to see if it is safe and effective for you  © Copyright [address] Information is for End User's use only and may not be sold, redistributed or otherwise used for commercial purposes   All illustrations and images included in CareNotes® are the copyrighted property of A D A M [employer]  or [address] [place] [address] [facility]

## 2021-06-02 ENCOUNTER — EVALUATION (OUTPATIENT)
Dept: PHYSICAL THERAPY | Facility: CLINIC | Age: 56
End: 2021-06-02
Payer: COMMERCIAL

## 2021-06-02 DIAGNOSIS — M54.16 LUMBAR RADICULOPATHY: ICD-10-CM

## 2021-06-02 DIAGNOSIS — M51.26 LUMBAR DISC HERNIATION: ICD-10-CM

## 2021-06-02 DIAGNOSIS — G89.29 CHRONIC BILATERAL LOW BACK PAIN WITHOUT SCIATICA: ICD-10-CM

## 2021-06-02 DIAGNOSIS — M54.50 CHRONIC BILATERAL LOW BACK PAIN WITHOUT SCIATICA: ICD-10-CM

## 2021-06-02 DIAGNOSIS — M48.062 SPINAL STENOSIS OF LUMBAR REGION WITH NEUROGENIC CLAUDICATION: ICD-10-CM

## 2021-06-02 PROCEDURE — 97112 NEUROMUSCULAR REEDUCATION: CPT | Performed by: PHYSICAL THERAPIST

## 2021-06-02 PROCEDURE — 97161 PT EVAL LOW COMPLEX 20 MIN: CPT | Performed by: PHYSICAL THERAPIST

## 2021-06-02 NOTE — PROGRESS NOTES
PT Evaluation     Today's date: 2021  Patient name: Ephraim Melara  : 1965  MRN: [id number]  Referring provider: Dunia Smith MD  Dx:   Encounter Diagnosis     ICD-10-CM    1  Chronic bilateral low back pain without sciatica  M54 5 Ambulatory referral to Physical Therapy    G89 29    2  Lumbar disc herniation  M51 26 Ambulatory referral to Physical Therapy   3  Spinal stenosis of lumbar region with neurogenic claudication  M48 062 Ambulatory referral to Physical Therapy   4  Lumbar radiculopathy  M54 16 Ambulatory referral to Physical Therapy       Start Time: 830          Assessment  Assessment details: Ephraim Melara is a 63 y o  male presenting to outpatient physical therapy at [place] [address] with complaints of chronic low back pain   They present with decreased range of motion, decreased strength, limited flexibility, poor postural awareness, poor body mechanics, poor balance, decreased tolerance to activity and decreased functional mobility due to Chronic bilateral low back pain without sciatica  Lumbar disc herniation  Spinal stenosis of lumbar region with neurogenic claudication  Lumbar radiculopathy  Tiara Cyphers would benefit from skilled physical therapy to address noted impairments in order to allow for full functional return to work and ADL-related activities  Thank you for the referral!  Impairments: abnormal gait, abnormal muscle firing, abnormal or restricted ROM, activity intolerance, impaired balance, impaired physical strength, lacks appropriate home exercise program, pain with function, poor posture  and poor body mechanics  Barriers to therapy: n/a  Understanding of Dx/Px/POC: excellent  Goals  ST   Independent with HEP in 2 weeks  2  Decrease pain by 50% in 3 wks      LT  Achieve FOTO score of 66/100 in 6 weeks   2   Able to sit to stand x10 without pain in 6 weeks  3  Negative loaded fwd raise test in 6 weeks  Plan  Plan details: RE in 6 weeks    Patient would benefit from: skilled physical therapy  Planned modality interventions: cryotherapy, electrical stimulation/Russian stimulation and thermotherapy: hydrocollator packs  Planned therapy interventions: abdominal trunk stabilization, manual therapy, neuromuscular re-education, therapeutic activities, therapeutic exercise, body mechanics training and home exercise program  Frequency: 2x week  Duration in visits: 12  Duration in weeks: 6  Plan of Care beginning date: 6/2/2021  Plan of Care expiration date: 7/16/2021  Treatment plan discussed with: patient        Subjective Evaluation    History of Present Illness  Mechanism of injury: Pt c/o chronic low back pain for 10+ years which he managed with chiropractic  August 2020, chiropractic was no longer helping his symptoms so he began seeking care from orthopedics/pain mgmt with Dr Mahmood Home  Recent MR imaging: "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 where central protrusion type disc herniation is noted along with facet hypertrophy which combine to result in mild central stenosis   There is moderate right greater than left bilateral foraminal   narrowing noted  Moderate bilateral foraminal narrowing noted L4-5   Degenerative changes elsewhere as described "    He received 2 epidural steroid injections in February 2021; few days of relief with the first, no relief after the second  Was prescribed gabapentin at f/u and referred to surgery with Dr Scott Letters  Pt reports relief of sx with gabapentin  6/3/21 pt is scheduled for "diagnostic lumbar medial branch blockade using a double block paradigm  If the patient receives significant pain relief of appropriate duration with bupivicaine 0 25%, we will confirm with bupivicaine 0 75%     If the patient demonstrates appropriate response to medial branch blockade we will schedule for radiofrequency ablation of the blocked nerves to provide long-term pain relief "    Pain is located bilateral lateral hip, anterior R hip, central and bilateral lumbar region  Occasional HS tightness  At rest pain is 0/10, when aggravated pain is 7/10  Agg with standing, reaching, walking on a decline, sit to stand  Eased with gabapentin, advil  Denies h/o falls, radiating pain, saddle anesthesia, bowel/bladder changes, recent infection, h/o prostate cancer, h/o renal stones  Occasional numbness to lateral R thigh  Noted numbness to penis within 10 minutes of sitting on a hard surface, which would resolve within few minutes with standing and walking  Pt has worked as a , 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  He lives alone in a single floor ranch home  Independent with all ADLs  Cannot do heavy lifting secondary to pain  In his free time he is primarily sedentary  He enjoys bike riding and walking though it is limited by pain  PMH of DB type 2, HTN, both controlled with medication  Patient Goals  Patient goals for therapy: decreased pain, increased motion, return to work, return to [employer] activities, independence with ADLs/IADLs and increased strength          Objective     Postural Observations    Additional Postural Observation Details  NL spinal curves maintained  Iliac crest, PSIS symmetrical     Tenderness     Additional Tenderness Details  No TTP noted  (-) slump test  (+) directional preference: lumbar flexion  (-) heel drop test  (+) loaded fwd raise test  (-) active SLR  (-) ely's  (-) severo's  (-) S/L lorie    ASLR to 90 deg bilaterally, very mild HS tightness      Neurological Testing     Sensation     Lumbar   Left   Intact: light touch    Right   Intact: light touch    Reflexes   Left   Patellar (L4): absent (0)  Achilles (S1): trace (1+)    Right   Patellar (L4): absent (0)  Achilles (S1): trace (1+)    Active Range of Motion     Lumbar   Flexion:  WFL  Extension:  WFL and with pain  Left lateral flexion:  WFL  Right lateral flexion:  WFL  Left rotation:  WFL  Right rotation:  [facility]    Joint Play   Joints within functional limits: T8, T9, T10, T11, T12, L1, L2, L3, L4, L5 and S1   Mechanical Assessment    Cervical      Thoracic      Lumbar    Standing flexion: repeated movements   Pain intensity: better  Standing extension: repeated movements  Pain level: produced    Strength/Myotome Testing     Left Hip   Planes of Motion   Flexion: 5  Extension: 4+  Abduction: 5  Adduction: 5  External rotation: 5  Internal rotation: 5    Right Hip   Planes of Motion   Flexion: 5  Extension: 4+  Abduction: 5  Adduction: 5  External rotation: 5  Internal rotation: 5    Left Knee   Flexion: 5  Extension: 5    Right Knee   Flexion: 5  Extension: 5    Left Ankle/Foot   Dorsiflexion: 5  Plantar flexion: 5  Inversion: 5  Eversion: 5    Right Ankle/Foot   Dorsiflexion: 5  Plantar flexion: 5  Inversion: 5  Eversion: 5    Muscle Activation     Additional Muscle Activation Details  TA activation fair  TA endurance poor  Functional Assessment        Comments  Gait: NL  Heel walk: NL  Toe walk: NL    Squat: mechanics fair, mild pain to central lumbar      Flowsheet Rows      Most Recent Value   PT/OT G-Codes   Current Score  59   Projected Score  66             Precautions: DM, HTN    HEP:  Access Code: [email or web address]  URL: [email or web address]  Date: 06/02/2021  Prepared by: Jim Fitzgerald    Exercises  Standing Forward Trunk Flexion - 15 reps  Seated Slump Nerve Glide - 15 reps  Supine Transversus Abdominis Bracing - Hands on Stomach - 20 reps - 5 hold  Supine Transversus Abdominis Bracing with Double Leg Fallout - 20 reps         6/2            Manuals                                                                 Neuro Re-Ed             TA activation 5"x10            TA+fall out x10            TA+march             TA+SLR             Bird dog             palloff press             Cable anti-rot walk out                                                                 Ther Ex             bike             Lumbar flexion AROM x10 Sciatic  x10                                                                                                                    Ther Activity                                       Gait Training                                       Modalities

## 2021-06-03 ENCOUNTER — HOSPITAL ENCOUNTER (OUTPATIENT)
Dept: RADIOLOGY | Facility: CLINIC | Age: 56
Discharge: HOME | End: 2021-06-03
Attending: ANESTHESIOLOGY | Admitting: ANESTHESIOLOGY
Payer: COMMERCIAL

## 2021-06-03 VITALS
TEMPERATURE: 97.3 F | RESPIRATION RATE: 20 BRPM | HEART RATE: 64 BPM | OXYGEN SATURATION: 94 % | DIASTOLIC BLOOD PRESSURE: 83 MMHG | SYSTOLIC BLOOD PRESSURE: 135 MMHG

## 2021-06-03 DIAGNOSIS — M47.816 LUMBAR SPONDYLOSIS: ICD-10-CM

## 2021-06-03 PROCEDURE — 64494 INJ PARAVERT F JNT L/S 2 LEV: CPT | Performed by: ANESTHESIOLOGY

## 2021-06-03 PROCEDURE — 64493 INJ PARAVERT F JNT L/S 1 LEV: CPT | Performed by: ANESTHESIOLOGY

## 2021-06-03 RX ORDER — BUPIVACAINE HCL/PF 2.5 MG/ML
10 VIAL (ML) INJECTION ONCE
Status: COMPLETED | OUTPATIENT
Start: 2021-06-03 | End: 2021-06-03

## 2021-06-03 RX ADMIN — BUPIVACAINE HYDROCHLORIDE 6 ML: 2.5 INJECTION, SOLUTION EPIDURAL; INFILTRATION; INTRACAUDAL at 10:32

## 2021-06-03 NOTE — DISCHARGE INSTR - LAB
ACTIVITY  · Please do activities that will bring on the normal pain that we are rating  For example, if vacuuming or walking increases the pain, do that  This will give the most accurate response to the diary  · You may shower, but no tub baths today, or applied heat  CARE OF THE INJECTION SITE  · This area may be numb for several hours after the injection  · Notify [facility] if you have any of the following:  redness, drainage, swelling, or fever above 100°F     SPECIAL INSTRUCTIONS  · Please return the MBB diary to our office by mail, fax, or drop it off  MEDICATIONS  · Please do not take any break through or short acting pain medications for 8 hours after the block  · Continue to take all routine medications  · Our office may have instructed you to hold some medications  As no general anesthesia was used in today's procedure, you should not experience any side effects related to anesthesia  If you have a problem specifically related to your procedure, please call our office at [phone]  Problems not related to your procedure should be directed to your primary care physician

## 2021-06-03 NOTE — H&P
History of Present Illness:  The patient is a 63 y o  male who presents with complaints of low back pain    Patient Active Problem List   Diagnosis    Type 2 diabetes mellitus without complication, without long-term current use of insulin (HCC)    Benign essential hypertension    Hyperlipidemia associated with type 2 diabetes mellitus (HCC)    Peptic reflux esophagitis    Morbid obesity ([address] )    Spinal stenosis of lumbar region with neurogenic claudication    Lumbar disc herniation    Chronic bilateral low back pain without sciatica    Chronic pain syndrome    Lumbar radiculopathy    Lumbar spondylosis       Past Medical History:   Diagnosis Date    Diabetes mellitus [address] [address] )     Hypertension     Low back pain        Past Surgical History:   Procedure Laterality Date    NO PAST SURGERIES           Current Outpatient Medications:     atorvastatin (LIPITOR) 10 mg tablet, TAKE 1 TABLET (10 MG TOTAL) BY MOUTH DAILY AS DIRECTED, Disp: 90 tablet, Rfl: 1    gabapentin (NEURONTIN) 300 mg capsule, Take 1 PO QID, Disp: 120 capsule, Rfl: 1    hydrochlorothiazide (HYDRODIURIL) 25 mg tablet, TAKE 1 TABLET BY MOUTH EVERY DAY, Disp: 90 tablet, Rfl: 0    metFORMIN (GLUCOPHAGE) 850 mg tablet, TAKE 1 TABLET (850 MG TOTAL) BY MOUTH 2 (TWO) TIMES A DAY, Disp: 60 tablet, Rfl: 5    Misc Natural Products (OSTEO BI-FLEX ADV TRIPLE ST) TABS, Take 1 tablet by mouth daily , Disp: , Rfl:     omeprazole (PriLOSEC) 20 mg delayed release capsule, TAKE 1 CAPSULE BY MOUTH EVERY DAY, Disp: 90 capsule, Rfl: 0    quinapril (ACCUPRIL) 40 MG tablet, TAKE 1 TABLET BY MOUTH EVERY DAY, Disp: 90 tablet, Rfl: 1    Current Facility-Administered Medications:     bupivacaine (PF) (MARCAINE) 0 25 % injection 10 mL, 10 mL, Perineural, Once, Calvin Low, DO    No Known Allergies    Physical Exam:   General: Awake, Alert, Oriented x 3, Mood and affect appropriate  Respiratory: Respirations even and unlabored  Cardiovascular: Peripheral pulses intact; no edema  Musculoskeletal Exam:  Pain with lumbar extension    ASA Score: III         Assessment:   1   Lumbar spondylosis        Plan: Bilateral L3,4,5 MBB with 0 25% bupiv

## 2021-06-07 ENCOUNTER — OFFICE VISIT (OUTPATIENT)
Dept: PHYSICAL THERAPY | Facility: CLINIC | Age: 56
End: 2021-06-07
Payer: COMMERCIAL

## 2021-06-07 DIAGNOSIS — M51.26 LUMBAR DISC HERNIATION: ICD-10-CM

## 2021-06-07 DIAGNOSIS — M54.50 CHRONIC BILATERAL LOW BACK PAIN WITHOUT SCIATICA: Primary | ICD-10-CM

## 2021-06-07 DIAGNOSIS — G89.29 CHRONIC BILATERAL LOW BACK PAIN WITHOUT SCIATICA: Primary | ICD-10-CM

## 2021-06-07 DIAGNOSIS — M54.16 LUMBAR RADICULOPATHY: ICD-10-CM

## 2021-06-07 DIAGNOSIS — M48.062 SPINAL STENOSIS OF LUMBAR REGION WITH NEUROGENIC CLAUDICATION: ICD-10-CM

## 2021-06-07 PROCEDURE — 97112 NEUROMUSCULAR REEDUCATION: CPT | Performed by: PHYSICAL THERAPIST

## 2021-06-07 PROCEDURE — 97110 THERAPEUTIC EXERCISES: CPT | Performed by: PHYSICAL THERAPIST

## 2021-06-07 NOTE — PROGRESS NOTES
Daily Note     Today's date: 2021  Patient name: Vishal Cavazos  : 1965  MRN: [id number]  Referring provider: Khanh Harding MD  Dx:   Encounter Diagnosis     ICD-10-CM    1  Chronic bilateral low back pain without sciatica  M54 5     G89 29    2  Lumbar disc herniation  M51 26    3  Spinal stenosis of lumbar region with neurogenic claudication  M48 062    4  Lumbar radiculopathy  M54 16        Start Time: 1215          Subjective: Doing better, thinks the sciatic glides are helping loosen him up  Going to try chiropractic again for the lumbar  Objective: See treatment diary below      Assessment: Initiated POC today  He was appropriately challenged with all lumbar stab TE  He did note some increase in pain with bridging  Soreness resolved end of session with sciatic glides  Tolerated treatment well  Patient demonstrated fatigue post treatment and would benefit from continued PT      Plan: Continue per plan of care  Precautions: DM, HTN    HEP:  Access Code: [email or web address]  URL: [email or web address]  Date: 2021  Prepared by: William Montemayor    Exercises  Standing Forward Trunk Flexion - 15 reps  Seated Slump Nerve Glide - 15 reps  Supine Transversus Abdominis Bracing - Hands on Stomach - 20 reps - 5 hold  Supine Transversus Abdominis Bracing with Double Leg Fallout - 20 reps                    Manuals                                                                 Neuro Re-Ed             TA activation 5"x10 5"x20           TA+fall out x10 x20           TA+march  x20           TA+SLR  2x10           TA+bridge  5"x20           Bird dog             palloff press  25# x20 ea           Cable anti-rot walk out  25# x10 ea                                                               Ther Ex             bike  7'           Lumbar flexion AROM x10 x15           Sciatic  x10 x15 Ther Activity                                       Gait Training                                       Modalities

## 2021-06-08 ENCOUNTER — TELEPHONE (OUTPATIENT)
Dept: RADIOLOGY | Facility: CLINIC | Age: 56
End: 2021-06-08

## 2021-06-08 DIAGNOSIS — M47.816 LUMBAR SPONDYLOSIS: Primary | ICD-10-CM

## 2021-06-09 ENCOUNTER — OFFICE VISIT (OUTPATIENT)
Dept: PHYSICAL THERAPY | Facility: CLINIC | Age: 56
End: 2021-06-09
Payer: COMMERCIAL

## 2021-06-09 DIAGNOSIS — G89.29 CHRONIC BILATERAL LOW BACK PAIN WITHOUT SCIATICA: Primary | ICD-10-CM

## 2021-06-09 DIAGNOSIS — M51.26 LUMBAR DISC HERNIATION: ICD-10-CM

## 2021-06-09 DIAGNOSIS — M54.50 CHRONIC BILATERAL LOW BACK PAIN WITHOUT SCIATICA: Primary | ICD-10-CM

## 2021-06-09 DIAGNOSIS — M48.062 SPINAL STENOSIS OF LUMBAR REGION WITH NEUROGENIC CLAUDICATION: ICD-10-CM

## 2021-06-09 DIAGNOSIS — M54.16 LUMBAR RADICULOPATHY: ICD-10-CM

## 2021-06-09 PROCEDURE — 97110 THERAPEUTIC EXERCISES: CPT | Performed by: PHYSICAL THERAPIST

## 2021-06-09 PROCEDURE — 97112 NEUROMUSCULAR REEDUCATION: CPT | Performed by: PHYSICAL THERAPIST

## 2021-06-09 NOTE — PROGRESS NOTES
Daily Note     Today's date: 2021  Patient name: Kika Mueller  : 1965  MRN: [id number]  Referring provider: Enrique Cox MD  Dx:   Encounter Diagnosis     ICD-10-CM    1  Chronic bilateral low back pain without sciatica  M54 5     G89 29    2  Lumbar disc herniation  M51 26    3  Spinal stenosis of lumbar region with neurogenic claudication  M48 062    4  Lumbar radiculopathy  M54 16        Start Time: 1400          Subjective: Feeling better  He's finding more relief with hot showers in the AM, which relieved his back pain in the past but became aggravating with most recent exacerbation  Objective: See treatment diary below      Assessment: Held on bridges due to causing pain lv  Added quad hip hike for multifidus activation and standing marching on airex for WB core stability  Appropriately challenged and no increase in pain noted t/o session  Tolerated treatment well  Patient demonstrated fatigue post treatment and would benefit from continued PT      Plan: Continue per plan of care  Precautions: DM, HTN    HEP:  Access Code: [email or web address]  URL: [email or web address]  Date: 2021  Prepared by: Brenda Reynaga    Exercises  Standing Forward Trunk Flexion - 15 reps  Seated Slump Nerve Glide - 15 reps  Supine Transversus Abdominis Bracing - Hands on Stomach - 20 reps - 5 hold  Supine Transversus Abdominis Bracing with Double Leg Fallout - 20 reps                   Manuals                                                                 Neuro Re-Ed             TA activation 5"x10 5"x20 5"x20          TA+fall out x10 x20 x20          TA+march  x20 x20          TA+SLR  2x10 2x10          TA+bridge  5"x20           Bird dog             palloff press  25# x20 ea 25# x20 ea          Cable anti-rot walk out  25# x10 ea 25# x20 ea          Quad hip hike   airex 5"x15 ea          march   airex 2x20                                    Ther Ex             bike  7' 7' Lumbar flexion AROM x10 x15 x15          Sciatic  x10 x15 x15                                                                                                                  Ther Activity                                       Gait Training                                       Modalities

## 2021-06-12 LAB
ALBUMIN SERPL-MCNC: 5 G/DL (ref 3.8–4.9)
ALBUMIN/GLOB SERPL: 1.9 {RATIO} (ref 1.2–2.2)
ALP SERPL-CCNC: 53 IU/L (ref 48–121)
ALT SERPL-CCNC: 76 IU/L (ref 0–44)
AST SERPL-CCNC: 38 IU/L (ref 0–40)
BILIRUB SERPL-MCNC: 0.8 MG/DL (ref 0–1.2)
BUN SERPL-MCNC: 18 MG/DL (ref 6–24)
BUN/CREAT SERPL: 17 (ref 9–20)
CALCIUM SERPL-MCNC: 10 MG/DL (ref 8.7–10.2)
CHLORIDE SERPL-SCNC: 99 MMOL/L (ref 96–106)
CHOLEST SERPL-MCNC: 183 MG/DL (ref 100–199)
CO2 SERPL-SCNC: 24 MMOL/L (ref 20–29)
CREAT SERPL-MCNC: 1.05 MG/DL (ref 0.76–1.27)
EST. AVERAGE GLUCOSE BLD GHB EST-MCNC: 134 MG/DL
GLOBULIN SER-MCNC: 2.7 G/DL (ref 1.5–4.5)
GLUCOSE SERPL-MCNC: 127 MG/DL (ref 65–99)
HBA1C MFR BLD: 6.3 % (ref 4.8–5.6)
HDLC SERPL-MCNC: 29 MG/DL
LDLC SERPL CALC-MCNC: 120 MG/DL (ref 0–99)
LDLC/HDLC SERPL: 4.1 RATIO (ref 0–3.6)
POTASSIUM SERPL-SCNC: 4.3 MMOL/L (ref 3.5–5.2)
PROT SERPL-MCNC: 7.7 G/DL (ref 6–8.5)
SL AMB EGFR AFRICAN AMERICAN: 91 ML/MIN/1.73
SL AMB EGFR NON AFRICAN AMERICAN: 79 ML/MIN/1.73
SL AMB VLDL CHOLESTEROL CALC: 34 MG/DL (ref 5–40)
SODIUM SERPL-SCNC: 138 MMOL/L (ref 134–144)
TRIGL SERPL-MCNC: 189 MG/DL (ref 0–149)

## 2021-06-12 PROCEDURE — 3044F HG A1C LEVEL LT 7.0%: CPT | Performed by: NURSE PRACTITIONER

## 2021-06-13 NOTE — RESULT ENCOUNTER NOTE
Call patient with lab result-sugars are much improved  Continue current medications and diet  Schedule physical which is overdue

## 2021-06-14 ENCOUNTER — OFFICE VISIT (OUTPATIENT)
Dept: PHYSICAL THERAPY | Facility: CLINIC | Age: 56
End: 2021-06-14
Payer: COMMERCIAL

## 2021-06-14 ENCOUNTER — TELEPHONE (OUTPATIENT)
Dept: FAMILY MEDICINE CLINIC | Facility: CLINIC | Age: 56
End: 2021-06-14

## 2021-06-14 DIAGNOSIS — M48.062 SPINAL STENOSIS OF LUMBAR REGION WITH NEUROGENIC CLAUDICATION: ICD-10-CM

## 2021-06-14 DIAGNOSIS — M51.26 LUMBAR DISC HERNIATION: ICD-10-CM

## 2021-06-14 DIAGNOSIS — M54.50 CHRONIC BILATERAL LOW BACK PAIN WITHOUT SCIATICA: Primary | ICD-10-CM

## 2021-06-14 DIAGNOSIS — G89.29 CHRONIC BILATERAL LOW BACK PAIN WITHOUT SCIATICA: Primary | ICD-10-CM

## 2021-06-14 DIAGNOSIS — M54.16 LUMBAR RADICULOPATHY: ICD-10-CM

## 2021-06-14 PROCEDURE — 97110 THERAPEUTIC EXERCISES: CPT | Performed by: PHYSICAL THERAPIST

## 2021-06-14 PROCEDURE — 97112 NEUROMUSCULAR REEDUCATION: CPT | Performed by: PHYSICAL THERAPIST

## 2021-06-14 NOTE — TELEPHONE ENCOUNTER
----- Message from Rachell Anaya MD sent at 6/13/2021 12:14 PM EDT -----  Call patient with lab result-sugars are much improved  Continue current medications and diet  Schedule physical which is overdue  Interventional Radiology Chart Round     Patient: Maryellen Austin Date: 1/15/2021  : 1983 Attending: MD Vicky Cooper DO   37 year old female     Reason for Follow -up:  · 2021: s/p 10 Fr pelvic abscess drain placement with Dr Gil    Chief Complaint: Spoke with RN (Summer) staff. Patient is doing much better today. Pain meds are controlling pain. Nausea is improving. No acute events overnight.     History: Maryellen is a 37 year old female with PMHx for wheezing, seasonal affective disorder, GERD, anemia and allergies. Patient presented to ER on  for urinary retention and LLQ abdominal pain. Recently had IUD recently removed on . Imaging demonstrating concern for tubo-ovarian abscess.  Patient was transferred to [facility] for IR drain placement (10 Fr pelvic drain with Dr Gil).    Subjective:   GENERAL: Denies fever/chills, night sweats, dizziness or lightheadedness  CARDIAC: Denies chest pain, chest pressure, palpitations  PULMONARY: Denies cough, sputum production, hemoptysis, shortness of breath  MUSCULOSKELETAL: Denies joint stiffness and swelling, back pain  GI: Denies vomiting, diarrhea. Tolerating diet. Positive for improving but persistent LLQ abdominal pain    Physical Exam:  Per RN nursing staff.   General appearance: alert, appears stated age, cooperative and mild distress  Abdomen: soft, slightly distended. Tender to LLQ to palpation.   Pelvic: 10 Fr Pelvic drain in place to AUDREY suction with puncture site CD+I without oozing, bleeding, pain or hematoma. Having more thin red drainage in bulb. Tenderness to palpation.  Extremities: LE warm and well perfused. No edema. Pulses intact.     Vital Last Value 24 Hour Range   Temperature 98.1 °F (36.7 °C) (01/15/21 0613) Temp  Min: 98.1 °F (36.7 °C)  Max: 98.4 °F (36.9 °C)   Pulse 81 (01/15/21 0613) Pulse  Min: 81  Max: 85   Respiratory 18 (01/15/21 0613) Resp  Min: 18  Max: 18   Non-Invasive  Blood Pressure 109/57  (01/15/21 0613) BP  Min: 109/57  Max: 114/69   Arterial  Blood Pressure   No data recorded   Pulse Oximetry 98 % (01/15/21 0613) SpO2  Min: 98 %  Max: 98 %     Lab Results   Component Value Date    SODIUM 134 (L) 2021    POTASSIUM 3.5 2021    BUN 5 (L) 2021    CREATININE 0.66 2021    WBC 13.1 (H) 01/15/2021    HCT 29.5 (L) 01/15/2021    HGB 9.8 (L) 01/15/2021     (H) 01/15/2021    INR 1.0 2021    GLUCOSE 109 (H) 2021    TSH 4.053 2020    BILIRUBIN 0.5 2021     Laboratory Results: Above labs last 24 hours were reviewed    Scheduled Medications: Reviewed in EPIC 1/15/2021  • ondansetron       • ibuprofen       • VANCOMYCIN - PHARMACIST MONITORED   Does not apply See Admin Instructions   • vancomycin (VANCOCIN) IVPB  1,500 mg Intravenous Q12H   • cefTRIAXone (ROCEPHIN) IV  2,000 mg Intravenous Daily   • lactobacillus acidophilus  1 tablet Oral Daily   • levothyroxine  50 mcg Oral QAM AC     PRN Medications:  ketorolac (TORADOL) injection, ibuprofen, HYDROcodone-acetaminophen, acetaminophen, zolpidem, ondansetron, HYDROmorphone    Continuous Infusions:  • sodium chloride 0.9% infusion 100 mL/hr at 01/10/21 1400   • lactated ringers infusion Stopped (21 1522)     Intake/Output:  Date 21 - 01/15/21 0659 01/15/21 07 - 21 0659   Shift 5249-1840 3007-9893 8263-9054 24 Hour Total 8227-8681 2270-3396 8868-4434 24 Hour Total   INTAKE   Other 5 10 5 20       Shift Total 5 10 5 20       OUTPUT   Urine 950   950       Drains 9 12.5 1 22.5         Output (ml) (Drain 21 #1 Left Buttock Bulb (e.g. AUDREY, Davol, etc)) 9 12.5 1 22.5       Shift Total 959 12.5 1 972.5       Weight (kg) 81.6 81.6 81.6 81.6 81.6 81.6 81.6 81.6     Imagin2021: CT abdomen Pelvis   IMPRESSION:  Multiloculated abscess inseparable from the left adnexa within the posterior midline pelvis, mildly decreased in size since prior status post drainage catheter  placement.    1/11/2021:Ct Guided Drainage    Impression: IMPRESSION:  Successful CT guided drainage of cloudy, yellow/green-appearing fluid.  PLAN: 1.  Continue antibiotics per the primary service and await microbiology results. 2.  Maintain catheter to bulb suction and document output. 3.  Consider imaging to assess adequacy of drainage if there is no clinical response.     1/9/2021: Ct Abdomen Pelvis W Contrast    Impression: IMPRESSION: 1.  Complex cystic focus in the left adnexa appears dilated and tubular with thick wall and surrounding stranding. Findings are concerning for tubo-ovarian abscess. 2.  Mild left hydroureteronephrosis with urothelial thickening and stranding around mid to distal left ureter. This could indicate urinary tract infection and/or reactive inflammation related to tubo-ovarian abscess.    1/9/2021: Us Pelvis Non Ob And Duplex Complete    Impression: IMPRESSION: 1. Large complex cystic areas of the left ovary likely representing large hemorrhagic cyst. Follow-up should be performed in 2-3 menstrual cycles. 2. The uterus and right ovary appear unremarkable.     Assessment/Plan  37 year old female stable s/p 10 Fr pelvic drain placement for tubo-ovarian appearing abscess.     --Leukocytosis improving (13.1) --continue to follow   --Plan is to discharge when WBC normal and on oral abx   --Pain meds as needed   --Continue zofran for nausea-- improving  --Cultures reviewed and still pending (many gram positive cocci) -- continue abx   Many Streptococcus intermediusAbnormal    --Outputs: 22.5 cc/last 24 hours  --Drain care   --Monitor and record outputs every 8 hours even if 0 cc   --Keep to AUDREY suction    --Dressing changes daily and prn    --Flush with NS 5 cc BID (please subtract from daily total)  --ID on consult -- appreciate recommendations  --OBGYN following -- appreciate recommendations   --Patient can be discharged home with drain in place  --No IR follow up, will call weekly for  output update  --Nursing staff updated  --Discharge instructions written for drain care  --IR card/number for any questions, comments, concerns or changes in symptomology.    Home Drain Care Instructions:  Drain to AUDREY sxn. Empty and record outputs daily.  Flush drain 5mL sterile normal saline daily. Dressing changes every 2-3 days. Will plan on removing abscess drain once outputs decrease to <10cc per day likely IR sinogram vs CT sinogram prior to removal.    Shazia Napoles PA-C  Interventional Radiology  [phone]  Case discussed with Dr Gil

## 2021-06-14 NOTE — PROGRESS NOTES
Daily Note     Today's date: 2021  Patient name: Amol Choi  : 1965  MRN: [id number]  Referring provider: Gene Anderson MD  Dx:   Encounter Diagnosis     ICD-10-CM    1  Chronic bilateral low back pain without sciatica  M54 5     G89 29    2  Lumbar disc herniation  M51 26    3  Spinal stenosis of lumbar region with neurogenic claudication  M48 062    4  Lumbar radiculopathy  M54 16        Start Time: 1214          Subjective: Doing well  Feels good while doing the recumbent bike and is looking into buying one for his home  Objective: See treatment diary below      Assessment: Did well today with lumbar stab TE; did not perform extension based exercise in avoidance of aggravating sx  His core endurance is improving  Updated HEP, see below  Assess response nv  Tolerated treatment well  Patient demonstrated fatigue post treatment and would benefit from continued PT      Plan: Continue per plan of care  Precautions: DM, HTN    HEP:  Access Code: [email or web address]  URL: [email or web address]  Date: 2021  Prepared by: Indra Shell Rock    Exercises  Standing Forward Trunk Flexion - 15 reps  Seated Slump Nerve Glide - 15 reps  Supine Transversus Abdominis Bracing - Hands on Stomach - 10 reps - 10 hold  Supine March - 2 sets - 20 reps  Supine Active Straight Leg Raise - 2 sets - 10 reps  Standing Anti-Rotation Press with Anchored Resistance - 20 reps  Anti-Rotation Lateral Stepping with Press - 20 reps          6         Manuals                                                                 Neuro Re-Ed             TA activation 5"x10 5"x20 5"x20 10"x10         TA+fall out x10 x20 x20          TA+march  x20 x20 x20         TA+SLR  2x10 2x10 2x10         TA+bridge  5"x20           Bird dog             palloff press  25# x20 ea 25# x20 ea 25# x20 ea         Cable anti-rot walk out  25# x10 ea 25# x20 ea 25# x20 ea         Quad hip hike   airex 5"x15 ea airex 5"x15 ea march   airex 2x20 airex x20    airex +10# suitcase hold x10 ea                                   Ther Ex             bike  7' 7' 7'         Lumbar flexion AROM x10 x15 x15 x15         Sciatic  x10 x15 x15 x15                                                                                                                 Ther Activity                                       Gait Training                                       Modalities

## 2021-06-16 ENCOUNTER — OFFICE VISIT (OUTPATIENT)
Dept: PHYSICAL THERAPY | Facility: CLINIC | Age: 56
End: 2021-06-16
Payer: COMMERCIAL

## 2021-06-16 DIAGNOSIS — M54.16 LUMBAR RADICULOPATHY: ICD-10-CM

## 2021-06-16 DIAGNOSIS — G89.29 CHRONIC BILATERAL LOW BACK PAIN WITHOUT SCIATICA: Primary | ICD-10-CM

## 2021-06-16 DIAGNOSIS — M54.50 CHRONIC BILATERAL LOW BACK PAIN WITHOUT SCIATICA: Primary | ICD-10-CM

## 2021-06-16 DIAGNOSIS — M51.26 LUMBAR DISC HERNIATION: ICD-10-CM

## 2021-06-16 DIAGNOSIS — M48.062 SPINAL STENOSIS OF LUMBAR REGION WITH NEUROGENIC CLAUDICATION: ICD-10-CM

## 2021-06-16 PROCEDURE — 97110 THERAPEUTIC EXERCISES: CPT | Performed by: PHYSICAL THERAPIST

## 2021-06-16 PROCEDURE — 97112 NEUROMUSCULAR REEDUCATION: CPT | Performed by: PHYSICAL THERAPIST

## 2021-06-16 NOTE — PROGRESS NOTES
Daily Note     Today's date: 2021  Patient name: Ephraim Melara  : 1965  MRN: [id number]  Referring provider: Dunia Smith MD  Dx:   Encounter Diagnosis     ICD-10-CM    1  Chronic bilateral low back pain without sciatica  M54 5     G89 29    2  Lumbar disc herniation  M51 26    3  Spinal stenosis of lumbar region with neurogenic claudication  M48 062    4  Lumbar radiculopathy  M54 16        Start Time: 1211          Subjective: Sore in the R hip today after doing some push mowing up hills yesterday  Bought a recumbent bike for home  Objective: See treatment diary below      Assessment: Hip soreness relieved with glute stretching beginning of session  Progressed all core TE and added squats for fxnl core stability training  Appropriately challenged  Tolerated treatment well  Patient demonstrated fatigue post treatment and would benefit from continued PT      Plan: Continue per plan of care  Precautions: DM, HTN    HEP:  Access Code: [email or web address]  URL: [email or web address]  Date: 2021  Prepared by: Tamia Cheung    Exercises  Standing Forward Trunk Flexion - 15 reps  Seated Slump Nerve Glide - 15 reps  Supine Transversus Abdominis Bracing - Hands on Stomach - 10 reps - 10 hold  Supine March - 2 sets - 20 reps  Supine Active Straight Leg Raise - 2 sets - 10 reps  Standing Anti-Rotation Press with Anchored Resistance - 20 reps  Anti-Rotation Lateral Stepping with Press - 20 reps          6 616        Manuals                                                                 Neuro Re-Ed             TA activation 5"x10 5"x20 5"x20 10"x10 10"x10        TA+fall out x10 x20 x20          TA+march  x20 x20 x20 2x20        TA+SLR  2x10 2x10 2x10 2x15        TA+bridge  5"x20           Bird dog             palloff press  25# x20 ea 25# x20 ea 25# x20 ea 35# x20 ea        Cable anti-rot walk out  25# x10 ea 25# x20 ea 25# x20 ea 35# x20 ea        Quad hip hike   airex 5"x15 ea airex 5"x15 ea airex 5"x15 ea    +unilat UE raise x15 ea        march   airex 2x20 airex x20    airex +10# suitcase hold x10 ea +KB suitcase hold    20# x20 ea  30# x20 ea                                  Ther Ex             bike  7' 7' 7' 7'        Lumbar flexion AROM x10 x15 x15 x15 x15    LF x15        Sciatic  x10 x15 x15 x15         glute stretch     x20 ea        squat     To chair x20                                                                                      Ther Activity                                       Gait Training                                       Modalities

## 2021-06-18 ENCOUNTER — OFFICE VISIT (OUTPATIENT)
Dept: PAIN MEDICINE | Facility: CLINIC | Age: 56
End: 2021-06-18
Payer: COMMERCIAL

## 2021-06-18 VITALS
SYSTOLIC BLOOD PRESSURE: 140 MMHG | TEMPERATURE: 98.4 F | HEIGHT: 68 IN | WEIGHT: 264 LBS | HEART RATE: 68 BPM | DIASTOLIC BLOOD PRESSURE: 82 MMHG | BODY MASS INDEX: 40.01 KG/M2

## 2021-06-18 DIAGNOSIS — G89.29 CHRONIC BILATERAL LOW BACK PAIN WITHOUT SCIATICA: ICD-10-CM

## 2021-06-18 DIAGNOSIS — M48.062 SPINAL STENOSIS OF LUMBAR REGION WITH NEUROGENIC CLAUDICATION: ICD-10-CM

## 2021-06-18 DIAGNOSIS — M47.816 LUMBAR SPONDYLOSIS: ICD-10-CM

## 2021-06-18 DIAGNOSIS — M54.16 LUMBAR RADICULOPATHY: ICD-10-CM

## 2021-06-18 DIAGNOSIS — M51.26 LUMBAR DISC HERNIATION: ICD-10-CM

## 2021-06-18 DIAGNOSIS — G89.4 CHRONIC PAIN SYNDROME: Primary | ICD-10-CM

## 2021-06-18 DIAGNOSIS — M54.50 CHRONIC BILATERAL LOW BACK PAIN WITHOUT SCIATICA: ICD-10-CM

## 2021-06-18 PROCEDURE — 1036F TOBACCO NON-USER: CPT | Performed by: NURSE PRACTITIONER

## 2021-06-18 PROCEDURE — 3008F BODY MASS INDEX DOCD: CPT | Performed by: NURSE PRACTITIONER

## 2021-06-18 PROCEDURE — 99214 OFFICE O/P EST MOD 30 MIN: CPT | Performed by: NURSE PRACTITIONER

## 2021-06-18 RX ORDER — GABAPENTIN 300 MG/1
CAPSULE ORAL
Qty: 120 CAPSULE | Refills: 2 | Status: SHIPPED | OUTPATIENT
Start: 2021-06-18 | End: 2021-09-10 | Stop reason: SDUPTHER

## 2021-06-18 NOTE — PROGRESS NOTES
Assessment:  1  Chronic pain syndrome    2  Chronic bilateral low back pain without sciatica    3  Lumbar radiculopathy    4  Lumbar disc herniation    5  Lumbar spondylosis    6  Spinal stenosis of lumbar region with neurogenic claudication        Plan:   While the patient was in the office today, I did have a thorough conversation with the patient regarding their chronic pain syndrome, symptoms, medication regimen, and treatment plan  I discussed with the patient at this point time he should proceed with the confirmatory bilateral L3 through L5 diagnostic medial branch block with Dr Farida Rod as scheduled next week and we will proceed from there as appropriate with regards to the possible radiofrequency ablation procedures in the future  The patient was agreeable and verbalized an understanding  The patient is to continue with his physical therapy and I stressed the importance of the home exercise and stretching program   The patient was agreeable and verbalized an understanding  With regards to the gabapentin, I discussed with the patient at this point to continue to try to address the neuropathic and myofascial component both the patient and myself were in complete agreement that we feel it is in his best interest to continue the gabapentin as prescribed for now  The patient was agreeable and verbalized an understanding  The patient will follow-up in 12 weeks for medication prescription refill and reevaluation  The patient was advised to contact the office should their symptoms worsen in the interim  The patient was agreeable and verbalized an understanding  History of Present Illness: The patient is a 63 y o  male last seen on 6/3/2021 who presents for a follow up office visit in regards to Chronic pain syndrome secondary to lumbar spondylosis with a disc herniation, stenosis and radiculopathy    The patient currently reports that since his last office visit with the increase in the gabapentin, the addition of the physical therapy and the previous bilateral L3 through L5 diagnostic medial branch block with 0 25% bupivacaine, which was very positive and is scheduled for a confirmatory block with 0 75% bupivicaine next Thursday with Dr Chavez Rodriguez  The patient feels that the therapy has been helping the increase the gabapentin is helping as looking for to the confirmatory block to see if he is a candidate for the radiofrequency ablation procedure as he is hopeful all these things together will give him his best chance at the best amount of overall relief  He presents today for a regular medication follow-up visit  Current pain medications includes: Gabapentin 300 mg q i d  The patient reports that this regimen is providing 50% pain relief  The patient is reporting no side effects from this pain medication regimen  I have personally reviewed and/or updated the patient's past medical history, past surgical history, family history, social history, current medications, allergies, and vital signs today  Review of Systems:    Review of Systems   Respiratory: Negative for shortness of breath  Cardiovascular: Negative for chest pain  Gastrointestinal: Negative for constipation, diarrhea, nausea and vomiting  Musculoskeletal: Positive for gait problem  Negative for arthralgias, joint swelling and myalgias  Skin: Negative for rash  Neurological: Negative for dizziness, seizures and weakness  All other systems reviewed and are negative          Past Medical History:   Diagnosis Date    Diabetes mellitus ([address] )     Hypertension     Low back pain        Past Surgical History:   Procedure Laterality Date    NO PAST SURGERIES         Family History   Problem Relation Age of Onset    Diabetes Father     Hyperlipidemia Father     Bipolar disorder Sister         1 disorder    Other Sister         hx of suicide   Erich Ventura Lung cancer Mother     Substance Abuse Neg Hx        Social History Occupational History     Comment: full time   Tobacco Use    Smoking status: Former Smoker     Types: Cigarettes     Quit date:      Years since quittin 4    Smokeless tobacco: Never Used   Vaping Use    Vaping Use: Every day   Substance and Sexual Activity    Alcohol use: Yes    Drug use: No    Sexual activity: Yes     Partners: Female         Current Outpatient Medications:     atorvastatin (LIPITOR) 10 mg tablet, TAKE 1 TABLET (10 MG TOTAL) BY MOUTH DAILY AS DIRECTED, Disp: 90 tablet, Rfl: 1    gabapentin (NEURONTIN) 300 mg capsule, Take 1 PO QID, Disp: 120 capsule, Rfl: 2    hydrochlorothiazide (HYDRODIURIL) 25 mg tablet, TAKE 1 TABLET BY MOUTH EVERY DAY, Disp: 90 tablet, Rfl: 0    metFORMIN (GLUCOPHAGE) 850 mg tablet, TAKE 1 TABLET (850 MG TOTAL) BY MOUTH 2 (TWO) TIMES A DAY, Disp: 60 tablet, Rfl: 5    Misc Natural Products (OSTEO BI-FLEX ADV TRIPLE ST) TABS, Take 1 tablet by mouth daily , Disp: , Rfl:     omeprazole (PriLOSEC) 20 mg delayed release capsule, TAKE 1 CAPSULE BY MOUTH EVERY DAY, Disp: 90 capsule, Rfl: 0    quinapril (ACCUPRIL) 40 MG tablet, TAKE 1 TABLET BY MOUTH EVERY DAY, Disp: 90 tablet, Rfl: 1    No Known Allergies    Physical Exam:    /82 (BP Location: Left arm, Patient Position: Sitting, Cuff Size: Standard)   Pulse 68   Temp 98 4 °F (36 9 °C)   Ht 5' 8" (1 727 m)   Wt 120 kg (264 lb)   BMI 40 14 kg/m²     Constitutional:normal, well developed, well nourished, alert, in no distress and non-toxic and no overt pain behavior    Eyes:anicteric  HEENT:grossly intact  Neck:supple, symmetric, trachea midline and no masses   Pulmonary:even and unlabored  Cardiovascular:No edema or pitting edema present  Skin:Normal without rashes or lesions and well hydrated  Psychiatric:Mood and affect appropriate  Neurologic:Cranial Nerves II-XII grossly intact  Musculoskeletal:The patient's gait is slightly antalgic, but steady without the use of any assistive devices  Imaging  No orders to display         No orders of the defined types were placed in this encounter

## 2021-06-21 ENCOUNTER — OFFICE VISIT (OUTPATIENT)
Dept: PHYSICAL THERAPY | Facility: CLINIC | Age: 56
End: 2021-06-21
Payer: COMMERCIAL

## 2021-06-21 DIAGNOSIS — M54.16 LUMBAR RADICULOPATHY: ICD-10-CM

## 2021-06-21 DIAGNOSIS — M51.26 LUMBAR DISC HERNIATION: ICD-10-CM

## 2021-06-21 DIAGNOSIS — M48.062 SPINAL STENOSIS OF LUMBAR REGION WITH NEUROGENIC CLAUDICATION: ICD-10-CM

## 2021-06-21 DIAGNOSIS — M54.50 CHRONIC BILATERAL LOW BACK PAIN WITHOUT SCIATICA: Primary | ICD-10-CM

## 2021-06-21 DIAGNOSIS — G89.29 CHRONIC BILATERAL LOW BACK PAIN WITHOUT SCIATICA: Primary | ICD-10-CM

## 2021-06-21 PROCEDURE — 97112 NEUROMUSCULAR REEDUCATION: CPT | Performed by: PHYSICAL THERAPIST

## 2021-06-21 PROCEDURE — 97110 THERAPEUTIC EXERCISES: CPT | Performed by: PHYSICAL THERAPIST

## 2021-06-21 NOTE — PROGRESS NOTES
Daily Note     Today's date: 2021  Patient name: Charlie Red  : 1965  MRN: [id number]  Referring provider: Lew Siddiqui MD  Dx:   Encounter Diagnosis     ICD-10-CM    1  Chronic bilateral low back pain without sciatica  M54 5     G89 29    2  Lumbar disc herniation  M51 26    3  Spinal stenosis of lumbar region with neurogenic claudication  M48 062    4  Lumbar radiculopathy  M54 16        Start Time: 1215          Subjective: Sore and painful in the R hip anterior joint line since after last visit  Objective: See treatment diary below      Assessment: Hip soreness relieved with glute and hip flexor stretching  Considering squats added lv to also have aggravated sx  Held on squats today and plan to reintroduce squats with decreased hip ROM (mini squat nv)  Felt better end of session than beginning  Given piri stretch and hip flexor stretch for HEP  Tolerated treatment well  Patient demonstrated fatigue post treatment and would benefit from continued PT      Plan: Continue per plan of care  Precautions: DM, HTN    HEP:  Access Code: [email or web address]  URL: [email or web address]  Date: 2021  Prepared by: Juan Booth    Exercises  Standing Forward Trunk Flexion - 15 reps  Seated Slump Nerve Glide - 15 reps  Supine Transversus Abdominis Bracing - Hands on Stomach - 10 reps - 10 hold  Supine March - 2 sets - 20 reps  Supine Active Straight Leg Raise - 2 sets - 10 reps  Standing Anti-Rotation Press with Anchored Resistance - 20 reps  Anti-Rotation Lateral Stepping with Press - 20 reps                Manuals                                                                 Neuro Re-Ed             TA activation 5"x10 5"x20 5"x20 10"x10 10"x10 10"x10       TA+fall out x10 x20 x20          TA+march  x20 x20 x20 2x20 2x20       TA+SLR  2x10 2x10 2x10 2x15 2x15       TA+bridge  5"x20           Bird dog             palloff press  25# x20 ea 25# x20 ea 25# x20 ea 35# x20 ea 35# x20 ea       Cable anti-rot walk out  25# x10 ea 25# x20 ea 25# x20 ea 35# x20 ea 35# x20 ea       Quad hip hike   airex 5"x15 ea airex 5"x15 ea airex 5"x15 ea    +contra UE raise x15 ea airex +contra UE raise x15 ea       march   airex 2x20 airex x20    airex +10# suitcase hold x10 ea +KB suitcase hold    20# x20 ea  30# x20 ea +KB suitcase hold    30# 2x20 ea                                 Ther Ex             bike  7' 7' 7' 7' 7'       Lumbar flexion AROM x10 x15 x15 x15 x15    LF x15 x15    LF x15       Sciatic  x10 x15 x15 x15         glute stretch     x20 ea x20 ea       Hip flexor stretch      x20 ea       squat     To chair x20                                                                                      Ther Activity                                       Gait Training                                       Modalities

## 2021-06-23 ENCOUNTER — OFFICE VISIT (OUTPATIENT)
Dept: FAMILY MEDICINE CLINIC | Facility: CLINIC | Age: 56
End: 2021-06-23
Payer: COMMERCIAL

## 2021-06-23 ENCOUNTER — OFFICE VISIT (OUTPATIENT)
Dept: PHYSICAL THERAPY | Facility: CLINIC | Age: 56
End: 2021-06-23
Payer: COMMERCIAL

## 2021-06-23 VITALS
HEIGHT: 68 IN | DIASTOLIC BLOOD PRESSURE: 86 MMHG | SYSTOLIC BLOOD PRESSURE: 126 MMHG | WEIGHT: 267 LBS | HEART RATE: 72 BPM | OXYGEN SATURATION: 96 % | TEMPERATURE: 98.5 F | BODY MASS INDEX: 40.47 KG/M2

## 2021-06-23 DIAGNOSIS — E11.9 TYPE 2 DIABETES MELLITUS WITHOUT COMPLICATION, WITHOUT LONG-TERM CURRENT USE OF INSULIN (HCC): ICD-10-CM

## 2021-06-23 DIAGNOSIS — I10 BENIGN ESSENTIAL HYPERTENSION: ICD-10-CM

## 2021-06-23 DIAGNOSIS — E11.69 HYPERLIPIDEMIA ASSOCIATED WITH TYPE 2 DIABETES MELLITUS (HCC): ICD-10-CM

## 2021-06-23 DIAGNOSIS — M54.50 CHRONIC BILATERAL LOW BACK PAIN WITHOUT SCIATICA: Primary | ICD-10-CM

## 2021-06-23 DIAGNOSIS — K21.00 PEPTIC REFLUX ESOPHAGITIS: ICD-10-CM

## 2021-06-23 DIAGNOSIS — M48.062 SPINAL STENOSIS OF LUMBAR REGION WITH NEUROGENIC CLAUDICATION: ICD-10-CM

## 2021-06-23 DIAGNOSIS — E66.01 MORBID OBESITY (HCC): ICD-10-CM

## 2021-06-23 DIAGNOSIS — E78.5 HYPERLIPIDEMIA ASSOCIATED WITH TYPE 2 DIABETES MELLITUS (HCC): ICD-10-CM

## 2021-06-23 DIAGNOSIS — G89.29 CHRONIC BILATERAL LOW BACK PAIN WITHOUT SCIATICA: Primary | ICD-10-CM

## 2021-06-23 DIAGNOSIS — M54.16 LUMBAR RADICULOPATHY: ICD-10-CM

## 2021-06-23 DIAGNOSIS — M51.26 LUMBAR DISC HERNIATION: ICD-10-CM

## 2021-06-23 DIAGNOSIS — Z00.00 ANNUAL PHYSICAL EXAM: Primary | ICD-10-CM

## 2021-06-23 PROCEDURE — 3079F DIAST BP 80-89 MM HG: CPT | Performed by: FAMILY MEDICINE

## 2021-06-23 PROCEDURE — 99214 OFFICE O/P EST MOD 30 MIN: CPT | Performed by: FAMILY MEDICINE

## 2021-06-23 PROCEDURE — 3074F SYST BP LT 130 MM HG: CPT | Performed by: FAMILY MEDICINE

## 2021-06-23 PROCEDURE — 97140 MANUAL THERAPY 1/> REGIONS: CPT | Performed by: PHYSICAL THERAPIST

## 2021-06-23 PROCEDURE — 97110 THERAPEUTIC EXERCISES: CPT | Performed by: PHYSICAL THERAPIST

## 2021-06-23 PROCEDURE — 97112 NEUROMUSCULAR REEDUCATION: CPT | Performed by: PHYSICAL THERAPIST

## 2021-06-23 PROCEDURE — 3725F SCREEN DEPRESSION PERFORMED: CPT | Performed by: FAMILY MEDICINE

## 2021-06-23 PROCEDURE — 99396 PREV VISIT EST AGE 40-64: CPT | Performed by: FAMILY MEDICINE

## 2021-06-23 PROCEDURE — 3008F BODY MASS INDEX DOCD: CPT | Performed by: FAMILY MEDICINE

## 2021-06-23 PROCEDURE — 1036F TOBACCO NON-USER: CPT | Performed by: FAMILY MEDICINE

## 2021-06-23 RX ORDER — OMEPRAZOLE 20 MG/1
20 CAPSULE, DELAYED RELEASE ORAL DAILY
Qty: 90 CAPSULE | Refills: 1 | Status: SHIPPED | OUTPATIENT
Start: 2021-06-23 | End: 2022-04-09

## 2021-06-23 RX ORDER — HYDROCHLOROTHIAZIDE 25 MG/1
25 TABLET ORAL DAILY
Qty: 90 TABLET | Refills: 1 | Status: SHIPPED | OUTPATIENT
Start: 2021-06-23 | End: 2022-01-12

## 2021-06-23 NOTE — PROGRESS NOTES
Daily Note     Today's date: 2021  Patient name: Waymon Burkitt  : 1965  MRN: [id number]  Referring provider: Gely Nuñez MD  Dx:   Encounter Diagnosis     ICD-10-CM    1  Chronic bilateral low back pain without sciatica  M54 5     G89 29    2  Lumbar disc herniation  M51 26    3  Spinal stenosis of lumbar region with neurogenic claudication  M48 062    4  Lumbar radiculopathy  M54 16        Start Time: 1215          Subjective: Less soreness in the hip today than previous  Continues to have lateral R hip tightness with crossing his RLE ankle to L knee  Objective: See treatment diary below      Assessment: R hip mobility ankle to opposite knee position in sitting eased with R hip lateral belted glides, glute STM and quadruped posterior hip rocking  Updated HEP with hip mobility and core work  Tolerated treatment well  Patient demonstrated fatigue post treatment and would benefit from continued PT      Plan: Continue per plan of care  Precautions: DM, HTN    HEP:  Access Code: [email or web address]  URL: [email or web address]  Date: 2021  Prepared by: Charity Ventura    Exercises  Piriformis Mobilization with Donita Susan - 3-5 min hold  Quadruped Rockback Posterior Hip Capsule Stretch - 20 reps  Seated Piriformis Stretch with Trunk Bend - 20 reps  Standing Hip Flexor Stretch - 20 reps  Standing Forward Trunk Flexion - 20 reps  Supine Transversus Abdominis Bracing - Hands on Stomach - 10 reps - 10 hold  Supine March - 2 sets - 20 reps  Supine Active Straight Leg Raise - 2 sets - 15-20 reps  Quadruped Alternating Shoulder Flexion - 20 reps       6/ 6/7 6/ 614 616       Manuals             R hip belt lateral glide       CARLOZ      R glute STM       CARLOZ                                Neuro Re-Ed             TA activation 5"x10 5"x20 5"x20 10"x10 10"x10 10"x10       TA+fall out x10 x20 x20          TA+ x20 x20 2x20 2x20 2x20      TA+SLR  2x10 2x10 2x10 2x15 2x15 2x15 TA+bridge  5"x20           Bird dog             palloff press  25# x20 ea 25# x20 ea 25# x20 ea 35# x20 ea 35# x20 ea       Cable anti-rot walk out  25# x10 ea 25# x20 ea 25# x20 ea 35# x20 ea 35# x20 ea       Quad hip hike   airex 5"x15 ea airex 5"x15 ea airex 5"x15 ea    +contra UE raise x15 ea airex +contra UE raise x15 ea airex +contra UE raise x20 ea      march   airex 2x20 airex x20    airex +10# suitcase hold x10 ea +KB suitcase hold    20# x20 ea  30# x20 ea +KB suitcase hold    30# 2x20 ea +KB suitcase hold    35# 2x20 ea                                Ther Ex             bike  7' 7' 7' 7' 7' 7'      Lumbar flexion AROM x10 x15 x15 x15 x15    LF x15 x15    LF x15 x20      Sciatic  x10 x15 x15 x15         glute stretch     x20 ea x20 ea x20      Hip flexor stretch      x20 ea x20      Quad rock backs       x10      squat     To chair x20                                                                                      Ther Activity                                       Gait Training                                       Modalities

## 2021-06-23 NOTE — PATIENT INSTRUCTIONS

## 2021-06-23 NOTE — PROGRESS NOTES
[address]    NAME: Inder Mortensen  AGE: 63 y o  SEX: male  : 1965     DATE: 2021     Assessment and Plan:     Problem List Items Addressed This Visit     None          Immunizations and preventive care screenings were discussed with patient today  Appropriate education was printed on patient's after visit summary  Counseling:  Alcohol/drug use: discussed moderation in alcohol intake, the recommendations for healthy alcohol use, and avoidance of illicit drug use  Dental Health: discussed importance of regular tooth brushing, flossing, and dental visits  · Exercise: the importance of regular exercise/physical activity was discussed  Recommend exercise 3-5 times per week for at least 30 minutes  No follow-ups on file  Chief Complaint:     Chief Complaint   Patient presents with    Physical Exam      History of Present Illness:     Adult Annual Physical   Patient here for a comprehensive physical exam  The patient reports see list     Diet and Physical Activity  · Diet/Nutrition: well balanced diet, limited junk food and consuming 3-5 servings of fruits/vegetables daily  · Exercise: 5-7 times a week on average  Depression Screening  PHQ-9 Depression Screening    PHQ-9:   Frequency of the following problems over the past two weeks:      Little interest or pleasure in doing things: 0 - not at all  Feeling down, depressed, or hopeless: 0 - not at all  PHQ-2 Score: 0       General Health  · Sleep: sleeps well and gets 7-8 hours of sleep on average  · Hearing: normal - bilateral   · Vision: no vision problems  · Dental: regular dental visits and brushes teeth once daily   Health  · Symptoms include: none     Review of Systems:     Review of Systems   Constitutional: Negative for activity change, appetite change, diaphoresis and fatigue     Respiratory: Negative for cough, chest tightness, shortness of breath and wheezing  Cardiovascular: Negative for chest pain, palpitations and leg swelling  Fast or slow heart rate   Gastrointestinal: Negative for abdominal pain, blood in stool, constipation, diarrhea, nausea and vomiting  Genitourinary: Negative for difficulty urinating, dysuria, frequency and hematuria  Musculoskeletal: Negative for arthralgias, gait problem, joint swelling and myalgias  Neurological: Negative for dizziness, light-headedness and headaches  Psychiatric/Behavioral: Negative for agitation, confusion, dysphoric mood and sleep disturbance  The patient is not nervous/anxious         Past Medical History:     Past Medical History:   Diagnosis Date    Diabetes mellitus ([facility] [address] )     Hypertension     Low back pain       Past Surgical History:     Past Surgical History:   Procedure Laterality Date    NO PAST SURGERIES        Family History:     Family History   Problem Relation Age of Onset    Diabetes Father     Hyperlipidemia Father     Bipolar disorder Sister         1 disorder    Other Sister         hx of suicide   Lalitha Garcia Lung cancer Mother     Substance Abuse Neg Hx       Social History:     Social History     Socioeconomic History    Marital status: Single     Spouse name: None    Number of children: 0    Years of education: None    Highest education level: None   Occupational History    Occupation:      Comment: full time   Tobacco Use    Smoking status: Former Smoker     Types: Cigarettes     Quit date:      Years since quittin 4    Smokeless tobacco: Never Used   Vaping Use    Vaping Use: Never used   Substance and Sexual Activity    Alcohol use: Yes     Comment: 6 per year -- [employer]    Drug use: No    Sexual activity: Yes     Partners: Female   Other Topics Concern    None   Social History Narrative    Lives alone without help available     Social Determinants of Health     Financial Resource Strain:     Difficulty of Paying Living Expenses:    Food Insecurity:     Worried About Running Out of Food in the Last Year:     [address] in the Last Year:    Transportation Needs:     Lack of Transportation (Medical):  Lack of Transportation (Non-Medical):    Physical Activity:     Days of Exercise per Week:     Minutes of Exercise per Session:    Stress:     Feeling of Stress :    Social Connections:     Frequency of Communication with Friends and Family:     Frequency of Social Gatherings with Friends and Family:     Attends [employer] Services:     Active Member of Clubs or Organizations:     Attends Club or Organization Meetings:     Marital Status:    Intimate Partner Violence:     Fear of Current or Ex-Partner:     Emotionally Abused:     Physically Abused:     Sexually Abused:       Current Medications:     Current Outpatient Medications   Medication Sig Dispense Refill    atorvastatin (LIPITOR) 10 mg tablet TAKE 1 TABLET (10 MG TOTAL) BY MOUTH DAILY AS DIRECTED 90 tablet 1    gabapentin (NEURONTIN) 300 mg capsule Take 1 PO  capsule 2    hydrochlorothiazide (HYDRODIURIL) 25 mg tablet TAKE 1 TABLET BY MOUTH EVERY DAY 90 tablet 0    metFORMIN (GLUCOPHAGE) 850 mg tablet TAKE 1 TABLET (850 MG TOTAL) BY MOUTH 2 (TWO) TIMES A DAY 60 tablet 5    Misc Natural Products (OSTEO BI-FLEX ADV TRIPLE ST) TABS Take 1 tablet by mouth daily       omeprazole (PriLOSEC) 20 mg delayed release capsule TAKE 1 CAPSULE BY MOUTH EVERY DAY 90 capsule 0    quinapril (ACCUPRIL) 40 MG tablet TAKE 1 TABLET BY MOUTH EVERY DAY 90 tablet 1     No current facility-administered medications for this visit  Allergies:     No Known Allergies   Physical Exam:     /86 (BP Location: Left arm, Patient Position: Sitting, Cuff Size: Large)   Pulse 72   Temp 98 5 °F (36 9 °C) (Oral)   Ht 5' 8" (1 727 m)   Wt 121 kg (267 lb)   SpO2 96%   BMI 40 60 kg/m²     Physical Exam  Vitals and nursing note reviewed     HENT: Head: Normocephalic and atraumatic  Right Ear: External ear normal       Left Ear: External ear normal    Eyes:      General:         Right eye: No discharge  Left eye: No discharge  Pupils: Pupils are equal, round, and reactive to light  Neck:      Thyroid: No thyromegaly  Trachea: No tracheal deviation  Cardiovascular:      Rate and Rhythm: Normal rate and regular rhythm  Heart sounds: Normal heart sounds  No murmur heard  Pulmonary:      Effort: Pulmonary effort is normal  No respiratory distress  Breath sounds: Normal breath sounds  No wheezing  Abdominal:      General: Bowel sounds are normal  There is no distension  Palpations: Abdomen is soft  There is no mass  Tenderness: There is no abdominal tenderness  Musculoskeletal:         General: Normal range of motion  Cervical back: Normal range of motion and neck supple  Lymphadenopathy:      Cervical: No cervical adenopathy  Neurological:      Mental Status: He is alert and oriented to person, place, and time     Psychiatric:         Mood and Affect: Mood normal          Behavior: Behavior normal           Royce Kilpatrick MD  [id number]

## 2021-06-23 NOTE — PROGRESS NOTES
Subjective:   Chief Complaint   Patient presents with    Physical Exam        Patient ID: Tulio Young is a 63 y o  male  Medical management multiple issues-recent labs are reviewed  Medications are reviewed and reconciled  1) diabetes-A1c much improved with current medications  Patient has made some changes in his diet  Also has become exercising  2) hyperlipidemia levels are slightly elevated from previous and above recommended for diabetes  Patient continues on low-dose atorvastatin  3) hypertension-good control current medication  4) to BC-there has been some gain in weight  Patient has begun to exercise in reverse the trend  5) reflux symptoms-controlled with omeprazole per      The following portions of the patient's history were reviewed and updated as appropriate: allergies, current medications, past family history, past medical history, past social history, past surgical history and problem list     Review of Systems   Constitutional: Negative for appetite change, chills, diaphoresis and fever  HENT: Negative for ear pain, rhinorrhea, sinus pressure and sore throat  Eyes: Negative for discharge, redness and itching  Respiratory: Negative for cough, shortness of breath and wheezing  Cardiovascular: Negative for chest pain and palpitations  Rapid or slow heart rate   Gastrointestinal: Negative for abdominal pain, diarrhea, nausea and vomiting  Objective:  Vitals:    06/23/21 1000   BP: 126/86   BP Location: Left arm   Patient Position: Sitting   Cuff Size: Large   Pulse: 72   Temp: 98 5 °F (36 9 °C)   TempSrc: Oral   SpO2: 96%   Weight: 121 kg (267 lb)   Height: 5' 8" (1 727 m)      Physical Exam  Vitals reviewed  Constitutional:       General: He is not in acute distress  Appearance: He is well-developed  HENT:      Head: Normocephalic and atraumatic  Right Ear: Tympanic membrane and external ear normal  No drainage        Left Ear: Tympanic membrane normal  No drainage  Mouth/Throat:      Pharynx: No oropharyngeal exudate  Eyes:      General:         Right eye: No discharge  Left eye: No discharge  Conjunctiva/sclera: Conjunctivae normal    Neck:      Thyroid: No thyromegaly  Cardiovascular:      Rate and Rhythm: Normal rate and regular rhythm  Heart sounds: Normal heart sounds  Pulmonary:      Effort: Pulmonary effort is normal  No respiratory distress  Breath sounds: No wheezing or rales  Musculoskeletal:      Cervical back: Normal range of motion and neck supple  Right lower leg: No edema  Left lower leg: No edema  Lymphadenopathy:      Cervical: No cervical adenopathy  Psychiatric:         Mood and Affect: Mood normal          Behavior: Behavior normal            Assessment/Plan:    No problem-specific Assessment & Plan notes found for this encounter  Diagnoses and all orders for this visit:    Annual physical exam    Type 2 diabetes mellitus without complication, without long-term current use of insulin (HCC)  -     metFORMIN (GLUCOPHAGE) 850 mg tablet; Take 1 tablet (850 mg total) by mouth 2 (two) times a day  -     Hemoglobin A1C; Future  -     Comprehensive metabolic panel; Future  -     Hemoglobin A1C  -     Comprehensive metabolic panel    Hyperlipidemia associated with type 2 diabetes mellitus ([facility] )  -     Comprehensive metabolic panel; Future  -     Lipid Panel with Direct LDL reflex; Future  -     Comprehensive metabolic panel  -     Lipid Panel with Direct LDL reflex    Benign essential hypertension  -     hydrochlorothiazide (HYDRODIURIL) 25 mg tablet; Take 1 tablet (25 mg total) by mouth daily  -     Comprehensive metabolic panel; Future  -     CBC and differential; Future  -     Comprehensive metabolic panel  -     CBC and differential    Morbid obesity (HCC)    Peptic reflux esophagitis  -     omeprazole (PriLOSEC) 20 mg delayed release capsule;  Take 1 capsule (20 mg total) by mouth daily

## 2021-06-24 ENCOUNTER — HOSPITAL ENCOUNTER (OUTPATIENT)
Dept: RADIOLOGY | Facility: CLINIC | Age: 56
Discharge: HOME | End: 2021-06-24
Attending: ANESTHESIOLOGY | Admitting: ANESTHESIOLOGY
Payer: COMMERCIAL

## 2021-06-24 VITALS
HEART RATE: 69 BPM | OXYGEN SATURATION: 95 % | RESPIRATION RATE: 20 BRPM | TEMPERATURE: 97.5 F | DIASTOLIC BLOOD PRESSURE: 88 MMHG | SYSTOLIC BLOOD PRESSURE: 135 MMHG

## 2021-06-24 DIAGNOSIS — M47.816 LUMBAR SPONDYLOSIS: ICD-10-CM

## 2021-06-24 PROCEDURE — 64494 INJ PARAVERT F JNT L/S 2 LEV: CPT | Performed by: ANESTHESIOLOGY

## 2021-06-24 PROCEDURE — 64493 INJ PARAVERT F JNT L/S 1 LEV: CPT | Performed by: ANESTHESIOLOGY

## 2021-06-24 RX ORDER — BUPIVACAINE HYDROCHLORIDE 7.5 MG/ML
10 INJECTION, SOLUTION EPIDURAL; RETROBULBAR ONCE
Status: COMPLETED | OUTPATIENT
Start: 2021-06-24 | End: 2021-06-24

## 2021-06-24 RX ADMIN — BUPIVACAINE HYDROCHLORIDE 6 ML: 7.5 INJECTION, SOLUTION EPIDURAL; RETROBULBAR at 10:24

## 2021-06-24 NOTE — DISCHARGE INSTRUCTIONS
ACTIVITY  · Please do activities that will bring on the normal pain that we are rating  For example, if vacuuming or walking increases the pain, do that  This will give the most accurate response to the diary  · You may shower, but no tub baths today, or applied heat  CARE OF THE INJECTION SITE  · This area may be numb for several hours after the injection  · Notify [facility] if you have any of the following:  redness, drainage, swelling, or fever above 100°F     SPECIAL INSTRUCTIONS  · Please return the MBB diary to our office by mail, fax, or drop it off  MEDICATIONS  · Please do not take any break through or short acting pain medications for 8 hours after the block  · Continue to take all routine medications  · Our office may have instructed you to hold some medications  As no general anesthesia was used in today's procedure, you should not experience any side effects related to anesthesia  If you have a problem specifically related to your procedure, please call our office at [phone]  Problems not related to your procedure should be directed to your primary care physician

## 2021-06-24 NOTE — H&P
History of Present Illness: The patient is a 63 y o  male who presents with complaints of low back pain      Patient Active Problem List   Diagnosis    Type 2 diabetes mellitus without complication, without long-term current use of insulin (HCC)    Benign essential hypertension    Hyperlipidemia associated with type 2 diabetes mellitus (HCC)    Peptic reflux esophagitis    Morbid obesity ([address] )    Spinal stenosis of lumbar region with neurogenic claudication    Lumbar disc herniation    Chronic bilateral low back pain without sciatica    Chronic pain syndrome    Lumbar radiculopathy    Lumbar spondylosis       Past Medical History:   Diagnosis Date    Diabetes mellitus ([facility] )     Hypertension     Low back pain        Past Surgical History:   Procedure Laterality Date    NO PAST SURGERIES           Current Outpatient Medications:     atorvastatin (LIPITOR) 10 mg tablet, TAKE 1 TABLET (10 MG TOTAL) BY MOUTH DAILY AS DIRECTED, Disp: 90 tablet, Rfl: 1    gabapentin (NEURONTIN) 300 mg capsule, Take 1 PO QID, Disp: 120 capsule, Rfl: 2    hydrochlorothiazide (HYDRODIURIL) 25 mg tablet, Take 1 tablet (25 mg total) by mouth daily, Disp: 90 tablet, Rfl: 1    metFORMIN (GLUCOPHAGE) 850 mg tablet, Take 1 tablet (850 mg total) by mouth 2 (two) times a day, Disp: 180 tablet, Rfl: 1    Misc Natural Products (OSTEO BI-FLEX ADV TRIPLE ST) TABS, Take 1 tablet by mouth daily , Disp: , Rfl:     omeprazole (PriLOSEC) 20 mg delayed release capsule, Take 1 capsule (20 mg total) by mouth daily, Disp: 90 capsule, Rfl: 1    quinapril (ACCUPRIL) 40 MG tablet, TAKE 1 TABLET BY MOUTH EVERY DAY, Disp: 90 tablet, Rfl: 1    Current Facility-Administered Medications:     bupivacaine (PF) (MARCAINE) 0 75 % injection 10 mL, 10 mL, Other, Once, Calvin Low, DO    No Known Allergies    Physical Exam:   Vitals:    06/24/21 1015   BP: 137/86   Pulse: 76   Resp: 20   Temp: 97 5 °F (36 4 °C)   SpO2: 97%     General: Awake, Alert, Oriented x 3, Mood and affect appropriate  Respiratory: Respirations even and unlabored  Cardiovascular: Peripheral pulses intact; no edema  Musculoskeletal Exam:  Pain with lumbar extension    ASA Score: II    Patient/Chart Verification  Patient ID Verified: Verbal  ID Band Applied: No  Consents Confirmed: Procedural  H&P( within 30 days) Verified: To be obtained in the Pre-Procedure area  Interval H&P(within 24 hr) Complete (required for Outpatients and Surgery Admit only): To be obtained in the Pre-Procedure area  Allergies Reviewed: Yes  Anticoag/NSAID held?: No  Currently on antibiotics?: No  Pre-op Lab/Test Results Available: N/A    Assessment:   1   Lumbar spondylosis        Plan: Bilateral L3,4,5 MBB with 0 75% bupiv

## 2021-06-28 ENCOUNTER — OFFICE VISIT (OUTPATIENT)
Dept: PHYSICAL THERAPY | Facility: CLINIC | Age: 56
End: 2021-06-28
Payer: COMMERCIAL

## 2021-06-28 DIAGNOSIS — M48.062 SPINAL STENOSIS OF LUMBAR REGION WITH NEUROGENIC CLAUDICATION: ICD-10-CM

## 2021-06-28 DIAGNOSIS — M54.50 CHRONIC BILATERAL LOW BACK PAIN WITHOUT SCIATICA: ICD-10-CM

## 2021-06-28 DIAGNOSIS — M51.26 LUMBAR DISC HERNIATION: Primary | ICD-10-CM

## 2021-06-28 DIAGNOSIS — G89.29 CHRONIC BILATERAL LOW BACK PAIN WITHOUT SCIATICA: ICD-10-CM

## 2021-06-28 DIAGNOSIS — M54.16 LUMBAR RADICULOPATHY: ICD-10-CM

## 2021-06-28 PROCEDURE — 97110 THERAPEUTIC EXERCISES: CPT | Performed by: PHYSICAL THERAPIST

## 2021-06-28 PROCEDURE — 97112 NEUROMUSCULAR REEDUCATION: CPT | Performed by: PHYSICAL THERAPIST

## 2021-06-28 NOTE — PROGRESS NOTES
Daily Note     Today's date: 2021  Patient name: Gibson Hicks  : 1965  MRN: [id number]  Referring provider: Nishi Bah MD  Dx:   Encounter Diagnosis     ICD-10-CM    1  Lumbar disc herniation  M51 26    2  Chronic bilateral low back pain without sciatica  M54 5     G89 29    3  Spinal stenosis of lumbar region with neurogenic claudication  M48 062    4  Lumbar radiculopathy  M54 16        Start Time: 1215          Subjective: Felt better after lv  He feels the mobility drills from the HEP are helping  Objective: See treatment diary below      Assessment: Better hip mobility with added hip opener stretch to chair  He is progressing well with core strength  Tolerated treatment well  Patient demonstrated fatigue post treatment and would benefit from continued PT      Plan: Continue per plan of care  Precautions: DM, HTN    HEP:  Access Code: [email or web address]  URL: [email or web address]  Date: 2021  Prepared by: Ganesh Esparza    Exercises  Piriformis Mobilization with [place] - 3-5 min hold  Quadruped Rockback Posterior Hip Capsule Stretch - 20 reps  Seated Piriformis Stretch with Trunk Bend - 20 reps  Standing Hip Flexor Stretch - 20 reps  Standing Forward Trunk Flexion - 20 reps  Supine Transversus Abdominis Bracing - Hands on Stomach - 10 reps - 10 hold  Supine March - 2 sets - 20 reps  Supine Active Straight Leg Raise - 2 sets - 15-20 reps  Quadruped Alternating Shoulder Flexion - 20 reps       6/2 6/7 6/9 614 16      Manuals             R hip belt lateral glide       CARLOZ      R glute STM       CARLOZ                                Neuro Re-Ed             TA activation 5"x10 5"x20 5"x20 10"x10 10"x10 10"x10       TA+fall out x10 x20 x20          TA+march  x20 x20 x20 2x20 2x20 2x20 2x20     TA+SLR  2x10 2x10 2x10 2x15 2x15 2x15 2x20     TA+bridge  5"x20           Bird dog             palloff press  25# x20 ea 25# x20 ea 25# x20 ea 35# x20 ea 35# x20 ea Cable anti-rot walk out  25# x10 ea 25# x20 ea 25# x20 ea 35# x20 ea 35# x20 ea       Quad hip hike   airex 5"x15 ea airex 5"x15 ea airex 5"x15 ea    +contra UE raise x15 ea airex +contra UE raise x15 ea airex +contra UE raise x20 ea airex +contra UE raise x20 ea     march   airex 2x20 airex x20    airex +10# suitcase hold x10 ea +KB suitcase hold    20# x20 ea  30# x20 ea +KB suitcase hold    30# 2x20 ea +KB suitcase hold    35# 2x20 ea +KB suitcase hold    35# 2x20 ea                               Ther Ex             bike  7' 7' 7' 7' 7' 7' 7'     Lumbar flexion AROM x10 x15 x15 x15 x15    LF x15 x15    LF x15 x20 x20    LF, rot x20     Sciatic  x10 x15 x15 x15         glute stretch     x20 ea x20 ea x20 x20     Hip flexor stretch      x20 ea x20 x20     Hip opener        Foot on chair x20     Quad rock backs       x10      squat     To chair x20                                                                                      Ther Activity                                       Gait Training                                       Modalities

## 2021-06-30 ENCOUNTER — OFFICE VISIT (OUTPATIENT)
Dept: PHYSICAL THERAPY | Facility: CLINIC | Age: 56
End: 2021-06-30
Payer: COMMERCIAL

## 2021-06-30 ENCOUNTER — TELEPHONE (OUTPATIENT)
Dept: RADIOLOGY | Facility: CLINIC | Age: 56
End: 2021-06-30

## 2021-06-30 DIAGNOSIS — G89.29 CHRONIC BILATERAL LOW BACK PAIN WITHOUT SCIATICA: ICD-10-CM

## 2021-06-30 DIAGNOSIS — M54.16 LUMBAR RADICULOPATHY: ICD-10-CM

## 2021-06-30 DIAGNOSIS — M54.50 CHRONIC BILATERAL LOW BACK PAIN WITHOUT SCIATICA: ICD-10-CM

## 2021-06-30 DIAGNOSIS — M51.26 LUMBAR DISC HERNIATION: Primary | ICD-10-CM

## 2021-06-30 DIAGNOSIS — M48.062 SPINAL STENOSIS OF LUMBAR REGION WITH NEUROGENIC CLAUDICATION: ICD-10-CM

## 2021-06-30 PROCEDURE — 97112 NEUROMUSCULAR REEDUCATION: CPT | Performed by: PHYSICAL THERAPIST

## 2021-06-30 PROCEDURE — 97110 THERAPEUTIC EXERCISES: CPT | Performed by: PHYSICAL THERAPIST

## 2021-06-30 NOTE — TELEPHONE ENCOUNTER
PER OP INSTRUCTIONS:    Reviewed instructions: Instructed pt to arrive at appt time, no earlier  Pt to wear a mask  Pt will need a  but they must wait in the car, NPO 1 hour prior, loose-fitting/comfortable clothes, no buttons/zippers and no ear rings/neckless, if ill/fever/infx/abx to call and reschedule  No immunizations or vaccinations 2w prior/after steroid injections  Hold medication x_ full days prior, last dose on    Patient stated verbal understanding  Aware that nursing will call her to review hold dates as well  COVID DISCLAIMER:    COVID disclaimer/ screening completed  Pt states they have NOT had COVID/no COVID vaccine and are aware no vaccines two weeks before and two weeks after procedure  Because of possible immunosuppression due to steroid, pt is aware that they should practice more strict social distancing, masking, handwashing for 2-3 weeks following procedure  Reviewed check in process with pt- will enter building no earlier than arrival time given, agreed to wear mask for duration of appt,  will wait in car

## 2021-06-30 NOTE — PROGRESS NOTES
Daily Note     Today's date: 2021  Patient name: Elijah Villanueva  : 1965  MRN: [id number]  Referring provider: Franc Parnell MD  Dx:   Encounter Diagnosis     ICD-10-CM    1  Lumbar disc herniation  M51 26    2  Chronic bilateral low back pain without sciatica  M54 5     G89 29    3  Spinal stenosis of lumbar region with neurogenic claudication  M48 062    4  Lumbar radiculopathy  M54 16        Start Time: 1213          Subjective: Doing well  The mobility work is really making a difference in the stiffness of his hips, low back  Objective: See treatment diary below      Assessment: Continues to improve in hip mobility and core strength  Able to tolerate progression in core work without c/o pain  Added standing lumbar flexion arom  Tolerated treatment well  Patient demonstrated fatigue post treatment and would benefit from continued PT      Plan: Continue per plan of care  Assess response to standing lumbar flexion AROM and increase reps as able  Precautions: DM, HTN    HEP:  Access Code: [email or web address]  URL: [email or web address]  Date: 2021  Prepared by: Amrit Cruz    Exercises  Piriformis Mobilization with [place] Began - 3-5 min hold  Quadruped Rockback Posterior Hip Capsule Stretch - 20 reps  Seated Piriformis Stretch with Trunk Bend - 20 reps  Standing Hip Flexor Stretch - 20 reps  Standing Forward Trunk Flexion - 20 reps  Supine Transversus Abdominis Bracing - Hands on Stomach - 10 reps - 10 hold  Supine March - 2 sets - 20 reps  Supine Active Straight Leg Raise - 2 sets - 15-20 reps  Quadruped Alternating Shoulder Flexion - 20 reps       6/ 6/7 6/    Manuals             R hip belt lateral glide       CARLOZ      R glute STM       CARLOZ                                Neuro Re-Ed             TA activation 5"x10 5"x20 5"x20 10"x10 10"x10 10"x10       TA+fall out x10 x20 x20          TA+march  x2 x20 x20 2x20 2x20 2x20 2x20     TA+SLR  2x10 2x10 2x10 2x15 2x15 2x15 2x20     TA+scissor kick         seated 2x20    TA+bridge  5"x20           Bird dog             palloff press  25# x20 ea 25# x20 ea 25# x20 ea 35# x20 ea 35# x20 ea   Half kneel 35# 2x20 ea    SS KB carry    Cable anti-rot walk out  25# x10 ea 25# x20 ea 25# x20 ea 35# x20 ea 35# x20 ea       Quad hip hike   airex 5"x15 ea airex 5"x15 ea airex 5"x15 ea    +contra UE raise x15 ea airex +contra UE raise x15 ea airex +contra UE raise x20 ea airex +contra UE raise x20 ea     march   airex 2x20 airex x20    airex +10# suitcase hold x10 ea +KB suitcase hold    20# x20 ea  30# x20 ea +KB suitcase hold    30# 2x20 ea +KB suitcase hold    35# 2x20 ea +KB suitcase hold    35# 2x20 ea     Suitcase carry         35# x4 laps ea                 Ther Ex             bike  7' 7' 7' 7' 7' 7' 7' 7'    Lumbar flexion AROM x10 x15 x15 x15 x15    LF x15 x15    LF x15 x20 x20    LF, rot x20 x20    LF, rot x20  Ext x10    Sciatic  x10 x15 x15 x15         glute stretch     x20 ea x20 ea x20 x20 x20    Hip flexor stretch      x20 ea x20 x20 x20    Hip opener        Foot on chair x20 Foot on chair x20    Quad rock backs       x10      squat     To chair x20                                                                                      Ther Activity                                       Gait Training                                       Modalities

## 2021-07-07 ENCOUNTER — OFFICE VISIT (OUTPATIENT)
Dept: PHYSICAL THERAPY | Facility: CLINIC | Age: 56
End: 2021-07-07
Payer: COMMERCIAL

## 2021-07-07 DIAGNOSIS — M54.16 LUMBAR RADICULOPATHY: ICD-10-CM

## 2021-07-07 DIAGNOSIS — M51.26 LUMBAR DISC HERNIATION: Primary | ICD-10-CM

## 2021-07-07 DIAGNOSIS — G89.29 CHRONIC BILATERAL LOW BACK PAIN WITHOUT SCIATICA: ICD-10-CM

## 2021-07-07 DIAGNOSIS — M48.062 SPINAL STENOSIS OF LUMBAR REGION WITH NEUROGENIC CLAUDICATION: ICD-10-CM

## 2021-07-07 DIAGNOSIS — M54.50 CHRONIC BILATERAL LOW BACK PAIN WITHOUT SCIATICA: ICD-10-CM

## 2021-07-07 PROCEDURE — 97110 THERAPEUTIC EXERCISES: CPT | Performed by: PHYSICAL THERAPIST

## 2021-07-07 PROCEDURE — 97112 NEUROMUSCULAR REEDUCATION: CPT | Performed by: PHYSICAL THERAPIST

## 2021-07-07 NOTE — PROGRESS NOTES
Daily Note     Today's date: 2021  Patient name: Gonsalo Chacon  : 1965  MRN: [id number]  Referring provider: Silvia Olson MD  Dx:   Encounter Diagnosis     ICD-10-CM    1  Lumbar disc herniation  M51 26    2  Chronic bilateral low back pain without sciatica  M54 5     G89 29    3  Spinal stenosis of lumbar region with neurogenic claudication  M48 062    4  Lumbar radiculopathy  M54 16        Start Time: 1740          Subjective: Feeling really good the past few days  More mobile and less pain/stiffness  Went to the chiropractor earlier this week and was able to be adjusted at levels he normally wouldn't feel movement  Objective: See treatment diary below      Assessment: Completed lumbar AROM all planes without issue  Hip mobility greatly improving with opener stretch on chair  Added isometric crunch hold with TA activation and bird dogs for advanced core work  Tolerated treatment well  Patient demonstrated fatigue post treatment and would benefit from continued PT      Plan: Continue per plan of care  Assess response to progression in core stability program and add squats nv as appropriate  Precautions: DM, HTN    HEP:  Access Code: [email or web address]  URL: [email or web address]  Date: 2021  Prepared by: Kody Monae    Exercises  Piriformis Mobilization with [place] - 3-5 min hold  Quadruped Rockback Posterior Hip Capsule Stretch - 20 reps  Seated Piriformis Stretch with Trunk Bend - 20 reps  Standing Hip Flexor Stretch - 20 reps  Standing Forward Trunk Flexion - 20 reps  Supine Transversus Abdominis Bracing - Hands on Stomach - 10 reps - 10 hold  Supine March - 2 sets - 20 reps  Supine Active Straight Leg Raise - 2 sets - 15-20 reps  Quadruped Alternating Shoulder Flexion - 20 reps          Manuals             R hip belt lateral glide       CARLOZ      R glute STM       CARLOZ                                Neuro Re-Ed TA activation 5"x10 5"x20 5"x20 10"x10 10"x10 10"x10    +crunch 5"x20   TA+fall out x10 x20 x20          TA+march  x20 x20 x20 2x20 2x20 2x20 2x20     TA+SLR  2x10 2x10 2x10 2x15 2x15 2x15 2x20     TA+scissor kick         seated 2x20    TA+bridge  5"x20           Bird dog          x20   palloff press  25# x20 ea 25# x20 ea 25# x20 ea 35# x20 ea 35# x20 ea   Half kneel 35# 2x20 ea    SS KB carry Half kneel 35# x20 ea   Cable anti-rot walk out  25# x10 ea 25# x20 ea 25# x20 ea 35# x20 ea 35# x20 ea    35# x20 elbow ext   Quad hip hike   airex 5"x15 ea airex 5"x15 ea airex 5"x15 ea    +contra UE raise x15 ea airex +contra UE raise x15 ea airex +contra UE raise x20 ea airex +contra UE raise x20 ea     march   airex 2x20 airex x20    airex +10# suitcase hold x10 ea +KB suitcase hold    20# x20 ea  30# x20 ea +KB suitcase hold    30# 2x20 ea +KB suitcase hold    35# 2x20 ea +KB suitcase hold    35# 2x20 ea  35# x20 +mini side crunch x20   Suitcase carry         35# x4 laps ea                 Ther Ex             bike  7' 7' 7' 7' 7' 7' 7' 7' 7'   Lumbar flexion AROM x10 x15 x15 x15 x15    LF x15 x15    LF x15 x20 x20    LF, rot x20 x20    LF, rot x20  Ext x10 x15 ea   Sciatic  x10 x15 x15 x15         glute stretch     x20 ea x20 ea x20 x20 x20 x15   Hip flexor stretch      x20 ea x20 x20 x20 x15   Hip opener        Foot on chair x20 Foot on chair x20 Foot on chair x15   Quad rock backs       x10      squat     To chair x20                                                                                      Ther Activity                                       Gait Training                                       Modalities

## 2021-07-09 ENCOUNTER — OFFICE VISIT (OUTPATIENT)
Dept: PHYSICAL THERAPY | Facility: CLINIC | Age: 56
End: 2021-07-09
Payer: COMMERCIAL

## 2021-07-09 DIAGNOSIS — M54.16 LUMBAR RADICULOPATHY: ICD-10-CM

## 2021-07-09 DIAGNOSIS — M48.062 SPINAL STENOSIS OF LUMBAR REGION WITH NEUROGENIC CLAUDICATION: ICD-10-CM

## 2021-07-09 DIAGNOSIS — G89.29 CHRONIC BILATERAL LOW BACK PAIN WITHOUT SCIATICA: ICD-10-CM

## 2021-07-09 DIAGNOSIS — M54.50 CHRONIC BILATERAL LOW BACK PAIN WITHOUT SCIATICA: ICD-10-CM

## 2021-07-09 DIAGNOSIS — M51.26 LUMBAR DISC HERNIATION: Primary | ICD-10-CM

## 2021-07-09 PROCEDURE — 97110 THERAPEUTIC EXERCISES: CPT | Performed by: PHYSICAL THERAPIST

## 2021-07-09 PROCEDURE — 97112 NEUROMUSCULAR REEDUCATION: CPT | Performed by: PHYSICAL THERAPIST

## 2021-07-09 NOTE — PROGRESS NOTES
Daily Note     Today's date: 2021  Patient name: Mira Justice  : 1965  MRN: [id number]  Referring provider: Terri Nash MD  Dx:   Encounter Diagnosis     ICD-10-CM    1  Lumbar disc herniation  M51 26    2  Chronic bilateral low back pain without sciatica  M54 5     G89 29    3  Spinal stenosis of lumbar region with neurogenic claudication  M48 062    4  Lumbar radiculopathy  M54 16        Start Time: 1220          Subjective: Felt really good after previous session  Did 10 squats yesterday with program and felt good  Objective: See treatment diary below      Assessment: Re-introduced squats with good response  Added alternating single leg hyperextensions for lumbar extensor strengthening  Some R anterior hip discomfort towards end of session  Tolerated treatment well  Patient demonstrated fatigue post treatment and would benefit from continued PT      Plan: Continue per plan of care  RE nv  Add hip hinge  Precautions: DM, HTN    HEP:  Access Code: [email or web address]  URL: [email or web address]  Date: 2021  Prepared by: Krys De Los Santos    Exercises  Piriformis Mobilization with Kailyn Altes - 3-5 min hold  Quadruped Rockback Posterior Hip Capsule Stretch - 20 reps  Seated Piriformis Stretch with Trunk Bend - 20 reps  Standing Hip Flexor Stretch - 20 reps  Standing Forward Trunk Flexion - 20 reps  Supine Transversus Abdominis Bracing - Hands on Stomach - 10 reps - 10 hold  Supine March - 2 sets - 20 reps  Supine Active Straight Leg Raise - 2 sets - 15-20 reps  Quadruped Alternating Shoulder Flexion - 20 reps        7   Manuals             RE                          Neuro Re-Ed             TA activation          +crunch 5"x20   TA+march        2x20     TA+SLR +crunch x20 ea       2x20     TA+scissor kick         seated 2x20    Bird dog x20         x20   palloff press         Half kneel 35# 2x20 ea    SS KB carry Half kneel 35# x20 ea   Cable anti-rot walk out          35# x20 elbow ext   Quad hip hike        airex +contra UE raise x20 ea     march        +KB suitcase hold    35# 2x20 ea  35# x20 +mini side crunch x20   Suitcase carry         35# x4 laps ea                 Ther Ex             bike 7'       7' 7' 7'   Lumbar flexion AROM x20 ea       x20    LF, rot x20 x20    LF, rot x20  Ext x10 x15 ea   glute stretch        x20 x20 x15   Hip flexor stretch x15       x20 x20 x15   Hip opener Foot on chair x20       Foot on chair x20 Foot on chair x20 Foot on chair x15   squat x20            Reverse hyper-ext SL alt x20 ea                                                                             Ther Activity                                       Gait Training                                       Modalities

## 2021-07-12 ENCOUNTER — TELEPHONE (OUTPATIENT)
Dept: PAIN MEDICINE | Facility: CLINIC | Age: 56
End: 2021-07-12

## 2021-07-12 ENCOUNTER — HOSPITAL ENCOUNTER (OUTPATIENT)
Dept: RADIOLOGY | Facility: CLINIC | Age: 56
Discharge: HOME | End: 2021-07-12
Attending: ANESTHESIOLOGY | Admitting: ANESTHESIOLOGY
Payer: COMMERCIAL

## 2021-07-12 ENCOUNTER — APPOINTMENT (OUTPATIENT)
Dept: PHYSICAL THERAPY | Facility: CLINIC | Age: 56
End: 2021-07-12
Payer: COMMERCIAL

## 2021-07-12 VITALS
OXYGEN SATURATION: 96 % | RESPIRATION RATE: 20 BRPM | SYSTOLIC BLOOD PRESSURE: 126 MMHG | HEART RATE: 69 BPM | DIASTOLIC BLOOD PRESSURE: 84 MMHG | TEMPERATURE: 98.3 F

## 2021-07-12 DIAGNOSIS — M47.816 LUMBAR SPONDYLOSIS: ICD-10-CM

## 2021-07-12 PROCEDURE — 64636 DESTROY L/S FACET JNT ADDL: CPT | Performed by: ANESTHESIOLOGY

## 2021-07-12 PROCEDURE — 64635 DESTROY LUMB/SAC FACET JNT: CPT | Performed by: ANESTHESIOLOGY

## 2021-07-12 RX ADMIN — LIDOCAINE HYDROCHLORIDE 3 ML: 20 INJECTION, SOLUTION EPIDURAL; INFILTRATION; INTRACAUDAL at 11:52

## 2021-07-12 NOTE — DISCHARGE INSTRUCTIONS

## 2021-07-12 NOTE — H&P
History of Present Illness: The patient is a 63 y o  male who presents with complaints of low back pain      Patient Active Problem List   Diagnosis    Type 2 diabetes mellitus without complication, without long-term current use of insulin (HCC)    Benign essential hypertension    Hyperlipidemia associated with type 2 diabetes mellitus (HCC)    Peptic reflux esophagitis    Morbid obesity ([address] )    Spinal stenosis of lumbar region with neurogenic claudication    Lumbar disc herniation    Chronic bilateral low back pain without sciatica    Chronic pain syndrome    Lumbar radiculopathy    Lumbar spondylosis       Past Medical History:   Diagnosis Date    [facility]     Hypertension     Low back pain        Past Surgical History:   Procedure Laterality Date    NO PAST SURGERIES           Current Outpatient Medications:     atorvastatin (LIPITOR) 10 mg tablet, TAKE 1 TABLET (10 MG TOTAL) BY MOUTH DAILY AS DIRECTED, Disp: 90 tablet, Rfl: 1    gabapentin (NEURONTIN) 300 mg capsule, Take 1 PO QID, Disp: 120 capsule, Rfl: 2    hydrochlorothiazide (HYDRODIURIL) 25 mg tablet, Take 1 tablet (25 mg total) by mouth daily, Disp: 90 tablet, Rfl: 1    metFORMIN (GLUCOPHAGE) 850 mg tablet, Take 1 tablet (850 mg total) by mouth 2 (two) times a day, Disp: 180 tablet, Rfl: 1    Misc Natural Products (OSTEO BI-FLEX ADV TRIPLE ST) TABS, Take 1 tablet by mouth daily , Disp: , Rfl:     omeprazole (PriLOSEC) 20 mg delayed release capsule, Take 1 capsule (20 mg total) by mouth daily, Disp: 90 capsule, Rfl: 1    quinapril (ACCUPRIL) 40 MG tablet, TAKE 1 TABLET BY MOUTH EVERY DAY, Disp: 90 tablet, Rfl: 1    Current Facility-Administered Medications:     lidocaine (PF) (XYLOCAINE-MPF) 2 % injection 5 mL, 5 mL, Perineural, Once, Calvin Low DO    No Known Allergies    Physical Exam:   General: Awake, Alert, Oriented x 3, Mood and affect appropriate  Respiratory: Respirations even and unlabored  Cardiovascular: Peripheral pulses intact; no edema  Musculoskeletal Exam:  Pain with lumbar extension    ASA Score: II         Assessment:   1   Lumbar spondylosis        Plan: Right L3,4,5 RFA

## 2021-07-13 NOTE — TELEPHONE ENCOUNTER
S/w pt  Stated that he is doing well after his rfa of yesterday  Per pt, + relief of his chronic pain symptoms  C/o some pain s/t procedure  Advised pt to allow 2-6 weeks for the full effect  Cb if s/s infection present: redness, drainage, swelling at the site or fever 100+, or if a sunburn like sensation in the area of the procedure presents  Ok to continue w/ rest, ice / heat, medication as prescribed / directed  Confirmed 7/26/2021 L sided procedure  Pt verbalized understanding and appreciation  Will cb if questions / concerns arise

## 2021-07-14 ENCOUNTER — EVALUATION (OUTPATIENT)
Dept: PHYSICAL THERAPY | Facility: CLINIC | Age: 56
End: 2021-07-14
Payer: COMMERCIAL

## 2021-07-14 DIAGNOSIS — G89.29 CHRONIC BILATERAL LOW BACK PAIN WITHOUT SCIATICA: ICD-10-CM

## 2021-07-14 DIAGNOSIS — M48.062 SPINAL STENOSIS OF LUMBAR REGION WITH NEUROGENIC CLAUDICATION: ICD-10-CM

## 2021-07-14 DIAGNOSIS — M51.26 LUMBAR DISC HERNIATION: Primary | ICD-10-CM

## 2021-07-14 DIAGNOSIS — M54.16 LUMBAR RADICULOPATHY: ICD-10-CM

## 2021-07-14 DIAGNOSIS — M54.50 CHRONIC BILATERAL LOW BACK PAIN WITHOUT SCIATICA: ICD-10-CM

## 2021-07-14 PROCEDURE — 97110 THERAPEUTIC EXERCISES: CPT | Performed by: PHYSICAL THERAPIST

## 2021-07-14 PROCEDURE — 97140 MANUAL THERAPY 1/> REGIONS: CPT | Performed by: PHYSICAL THERAPIST

## 2021-07-14 NOTE — PROGRESS NOTES
PT Re-Evaluation  and PT Discharge    Today's date: 2021  Patient name: Thang Willams  : 1965  MRN: [id number]  Referring provider: Erik Mauro MD  Dx:   Encounter Diagnosis     ICD-10-CM    1  Lumbar disc herniation  M51 26    2  Chronic bilateral low back pain without sciatica  M54 5     G89 29    3  Spinal stenosis of lumbar region with neurogenic claudication  M48 062    4  Lumbar radiculopathy  M54 16                   Assessment  Assessment details: Thang Willams is a 63 y o  male presenting to outpatient physical therapy at [place] [address] with complaints of chronic low back pain   They present with decreased range of motion, decreased strength, limited flexibility, poor postural awareness, poor body mechanics, poor balance, decreased tolerance to activity and decreased functional mobility due to Chronic bilateral low back pain without sciatica  Lumbar disc herniation  Spinal stenosis of lumbar region with neurogenic claudication  Lumbar radiculopathy  [facility] would benefit from skilled physical therapy to address noted impairments in order to allow for full functional return to work and ADL-related activities  Thank you for the referral!    RE: 21  Thang Willams has attended physical therapy for 12 visits  In this time, they have made significant improvements in symptoms and function, as noted by subjective report, improved balance, ROM, strength, flexibility and activity tolerance  They have made positive goal progress with FOTO score improvement from 59 at initial evaluation to 73 currently  Recommend d/c to HEP at this time      Impairments: abnormal gait, abnormal muscle firing, abnormal or restricted ROM, activity intolerance, impaired balance, impaired physical strength, lacks appropriate home exercise program, pain with function, poor posture  and poor body mechanics  Barriers to therapy: n/a  Understanding of Dx/Px/POC: excellent  Goals  ST   Independent with HEP in 2 weeks - met  2  Decrease pain by 50% in 3 wks - met      LT  Achieve FOTO score of 66/100 in 6 weeks - met (73/100)  2   Able to sit to stand x10 without pain in 6 weeks - met  3  Negative loaded fwd raise test in 6 weeks  - met      Plan  Plan details: D/c to HEP  Patient would benefit from: skilled physical therapy  Planned modality interventions: cryotherapy, electrical stimulation/Russian stimulation and thermotherapy: hydrocollator packs  Planned therapy interventions: abdominal trunk stabilization, manual therapy, neuromuscular re-education, therapeutic activities, therapeutic exercise, body mechanics training and home exercise program  Treatment plan discussed with: patient        Subjective Evaluation    History of Present Illness  Mechanism of injury: Pt c/o chronic low back pain for 10+ years which he managed with chiropractic  2020, chiropractic was no longer helping his symptoms so he began seeking care from orthopedics/pain mgmt with Dr Jolanta Barraza  Recent MR imaging: "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 where central protrusion type disc herniation is noted along with facet hypertrophy which combine to result in mild central stenosis   There is moderate right greater than left bilateral foraminal   narrowing noted  Moderate bilateral foraminal narrowing noted L4-5   Degenerative changes elsewhere as described "    He received 2 epidural steroid injections in 2021; few days of relief with the first, no relief after the second  Was prescribed gabapentin at f/u and referred to surgery with Dr Collin Stoner  Pt reports relief of sx with gabapentin  6/3/21 pt is scheduled for "diagnostic lumbar medial branch blockade using a double block paradigm  If the patient receives significant pain relief of appropriate duration with bupivicaine 0 25%, we will confirm with bupivicaine 0 75%     If the patient demonstrates appropriate response to medial branch blockade we will schedule for radiofrequency ablation of the blocked nerves to provide long-term pain relief "    Pain is located bilateral lateral hip, anterior R hip, central and bilateral lumbar region  Occasional HS tightness  At rest pain is 0/10, when aggravated pain is 7/10  Agg with standing, reaching, walking on a decline, sit to stand  Eased with gabapentin, advil  Denies h/o falls, radiating pain, saddle anesthesia, bowel/bladder changes, recent infection, h/o prostate cancer, h/o renal stones  Occasional numbness to lateral R thigh  Noted numbness to penis within 10 minutes of sitting on a hard surface, which would resolve within few minutes with standing and walking  Pt has worked as a brick layer, 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  He lives alone in a single floor ranch home  Independent with all ADLs  Cannot do heavy lifting secondary to pain  In his free time he is primarily sedentary  He enjoys bike riding and walking though it is limited by pain  PMH of DB type 2, HTN, both controlled with medication  RE: 7/14/21  Pt reports significant improvements in his symptoms and function, nearly 85% improved  As noted by improved hip mobility, decreased pain (1-2/10 on avg and some days no pain), decreased blood sugar, decreased blood pressure, and increased tolerance to exercise  He's gotten a recumbent bike for home which he's been using frequently in addition to the HEP  He does feel hesitant to do things such as swing a golf club  Pt underwent R L3,4,5 radio ablation by Dr Samy Mayo on 7/12  L side is planned for 2 weeks  He feels confident in d/c from therapy to HEP at this time  F/u with Dr Franky Reece in the fall and hopes to wean off gabapentin    Patient Goals  Patient goals for therapy: decreased pain, increased motion, return to work, return to [employer] activities, independence with ADLs/IADLs and increased strength          Objective     Postural Observations    Additional Postural Observation Details  NL spinal curves maintained  Iliac crest, PSIS symmetrical     Tenderness     Additional Tenderness Details  No TTP noted  (-) slump test  (+) directional preference: lumbar flexion  (-) heel drop test  (-) loaded fwd raise test  (-) active SLR  (-) ely's  (-) severo's  (-) S/L lorie    ASLR to 90 deg bilaterally, very mild HS tightness  Neurological Testing     Sensation     Lumbar   Left   Intact: light touch    Right   Intact: light touch    Reflexes   Left   Patellar (L4): absent (0)  Achilles (S1): trace (1+)    Right   Patellar (L4): absent (0)  Achilles (S1): trace (1+)    Active Range of Motion     Lumbar   Flexion:  WFL  Extension:  WFL and with pain  Left lateral flexion:  WFL  Right lateral flexion:  WFL  Left rotation:  [facility]  Right rotation:  [facility]    Additional Active Range of Motion Details  1/10 pain with ext    Joint Play   Joints within functional limits: T8, T9, T10, T11, T12, L1, L2, L3, L4, L5 and S1   Mechanical Assessment    Cervical      Thoracic      Lumbar    Standing flexion: repeated movements   Pain intensity: better  Standing extension: repeated movements  Pain level: produced    Strength/Myotome Testing     Left Hip   Planes of Motion   Flexion: 5  Extension: 5  Abduction: 5  Adduction: 5  External rotation: 5  Internal rotation: 5    Right Hip   Planes of Motion   Flexion: 5  Extension: 5  Abduction: 5  Adduction: 5  External rotation: 5  Internal rotation: 5    Left Knee   Flexion: 5  Extension: 5    Right Knee   Flexion: 5  Extension: 5    Left Ankle/Foot   Dorsiflexion: 5  Plantar flexion: 5  Inversion: 5  Eversion: 5    Right Ankle/Foot   Dorsiflexion: 5  Plantar flexion: 5  Inversion: 5  Eversion: 5    Muscle Activation     Additional Muscle Activation Details  TA activation good  TA endurance good      Functional Assessment        Comments  Gait: NL  Heel walk: NL  Toe walk: NL    Squat: mechanics fair, no pain             Precautions: DM, HTN    HEP:  Access Code: [email or web address]  URL: [email or web address]  Date: 07/14/2021  Prepared by: Seven Costa    Exercises  Hip opener stretch with foot on chair - 15 reps  Standing Hip Flexor Stretch - 15 reps  Standing Forward Trunk Flexion - 10 reps  Standing Lumbar Extension - 10 reps  Standing Sidebends - 10 reps  Standing Lumbar Rotation Stretch - 10 reps  Neutral Curl Up with Straight Leg - 1-2 sets - 5-10 reps - 10 sec hold  Bird Dog - 1-2 sets - 20 reps  Standing March - 1-2 sets - 20 reps  Prone Hip Extension on Table - 1-2 sets - 20 reps  Squat with Chair Touch - 1-2 sets - 20 reps  Anti-Rotation Sidestepping with Resistance - 1-2 sets - 20 reps       7/9 7/14 6/28 6/30 7/7   Manuals             RE  CARLOZ                        Neuro Re-Ed             TA activation          +crunch 5"x20   TA+march        2x20     TA+SLR +crunch x20 ea +crunch 10"x5 ea      2x20     TA+scissor kick         seated 2x20    Bird dog x20 x20        x20   palloff press         Half kneel 35# 2x20 ea    SS KB carry Half kneel 35# x20 ea   Cable anti-rot walk out          35# x20 elbow ext   Quad hip hike        airex +contra UE raise x20 ea     march        +KB suitcase hold    35# 2x20 ea  35# x20 +mini side crunch x20   Suitcase carry         35# x4 laps ea                 Ther Ex             bike 7' 7'      7' 7' 7'   Lumbar flexion AROM x20 ea       x20    LF, rot x20 x20    LF, rot x20  Ext x10 x15 ea   glute stretch        x20 x20 x15   Hip flexor stretch x15       x20 x20 x15   Hip opener Foot on chair x20       Foot on chair x20 Foot on chair x20 Foot on chair x15   squat x20            Reverse hyper-ext SL alt x20 ea                                                                             Ther Activity                                       Gait Training                                       Modalities

## 2021-07-26 ENCOUNTER — TELEPHONE (OUTPATIENT)
Dept: PAIN MEDICINE | Facility: CLINIC | Age: 56
End: 2021-07-26

## 2021-07-26 ENCOUNTER — HOSPITAL ENCOUNTER (OUTPATIENT)
Dept: RADIOLOGY | Facility: CLINIC | Age: 56
Discharge: HOME | End: 2021-07-26
Attending: ANESTHESIOLOGY | Admitting: ANESTHESIOLOGY
Payer: COMMERCIAL

## 2021-07-26 VITALS
HEART RATE: 75 BPM | RESPIRATION RATE: 20 BRPM | OXYGEN SATURATION: 93 % | TEMPERATURE: 97.3 F | SYSTOLIC BLOOD PRESSURE: 128 MMHG | DIASTOLIC BLOOD PRESSURE: 82 MMHG

## 2021-07-26 DIAGNOSIS — M47.816 LUMBAR SPONDYLOSIS: ICD-10-CM

## 2021-07-26 PROCEDURE — 64635 DESTROY LUMB/SAC FACET JNT: CPT | Performed by: ANESTHESIOLOGY

## 2021-07-26 PROCEDURE — 64636 DESTROY L/S FACET JNT ADDL: CPT | Performed by: ANESTHESIOLOGY

## 2021-07-26 RX ADMIN — LIDOCAINE HYDROCHLORIDE 3 ML: 20 INJECTION, SOLUTION EPIDURAL; INFILTRATION; INTRACAUDAL at 11:43

## 2021-07-26 NOTE — H&P
History of Present Illness: The patient is a 61 y o  male who presents with complaints of low back pain      Patient Active Problem List   Diagnosis    Type 2 diabetes mellitus without complication, without long-term current use of insulin (HCC)    Benign essential hypertension    Hyperlipidemia associated with type 2 diabetes mellitus (HCC)    Peptic reflux esophagitis    Morbid obesity ([address] )    Spinal stenosis of lumbar region with neurogenic claudication    Lumbar disc herniation    Chronic bilateral low back pain without sciatica    Chronic pain syndrome    Lumbar radiculopathy    Lumbar spondylosis       Past Medical History:   Diagnosis Date    [address] [facility] [address] [address]     Hypertension     Low back pain        Past Surgical History:   Procedure Laterality Date    NO PAST SURGERIES           Current Outpatient Medications:     atorvastatin (LIPITOR) 10 mg tablet, TAKE 1 TABLET (10 MG TOTAL) BY MOUTH DAILY AS DIRECTED, Disp: 90 tablet, Rfl: 1    gabapentin (NEURONTIN) 300 mg capsule, Take 1 PO QID, Disp: 120 capsule, Rfl: 2    hydrochlorothiazide (HYDRODIURIL) 25 mg tablet, Take 1 tablet (25 mg total) by mouth daily, Disp: 90 tablet, Rfl: 1    metFORMIN (GLUCOPHAGE) 850 mg tablet, Take 1 tablet (850 mg total) by mouth 2 (two) times a day, Disp: 180 tablet, Rfl: 1    Misc Natural Products (OSTEO BI-FLEX ADV TRIPLE ST) TABS, Take 1 tablet by mouth daily , Disp: , Rfl:     omeprazole (PriLOSEC) 20 mg delayed release capsule, Take 1 capsule (20 mg total) by mouth daily, Disp: 90 capsule, Rfl: 1    quinapril (ACCUPRIL) 40 MG tablet, TAKE 1 TABLET BY MOUTH EVERY DAY, Disp: 90 tablet, Rfl: 1    Current Facility-Administered Medications:     lidocaine (PF) (XYLOCAINE-MPF) 2 % injection 5 mL, 5 mL, Perineural, Once, Calvin Low DO    No Known Allergies    Physical Exam:   Vitals:    07/26/21 1124   BP: 138/87   Pulse: 83   Resp: 20   SpO2: 92%     General: Awake, Alert, Oriented x 3, Mood and affect appropriate  Respiratory: Respirations even and unlabored  Cardiovascular: Peripheral pulses intact; no edema  Musculoskeletal Exam:  Decreased range of motion lumbar spine    ASA Score: II    Patient/Chart Verification  Patient ID Verified: Verbal  ID Band Applied: No  Consents Confirmed: Procedural  H&P( within 30 days) Verified: To be obtained in the Pre-Procedure area  Interval H&P(within 24 hr) Complete (required for Outpatients and Surgery Admit only): To be obtained in the Pre-Procedure area  Allergies Reviewed: Yes  Anticoag/NSAID held?: No  Currently on antibiotics?: No  Pre-op Lab/Test Results Available: N/A    Assessment:   1   Lumbar spondylosis        Plan: LEFT L3,4,5 RFA

## 2021-07-26 NOTE — TELEPHONE ENCOUNTER
Patient is s/p a Left L3, L4,L5 RFA c/ SL on 7/26/21  OVS is scheduled for 9/10/21 for F/U  Please call on 7/27/21 post RFA    Thanks

## 2021-07-26 NOTE — DISCHARGE INSTRUCTIONS

## 2021-07-27 NOTE — TELEPHONE ENCOUNTER
S/w pt, stated that he has a little increase in his usual pain  Advised pt to allow 2-6 weeks for the full effect  Cb if s/s infection present: redness, drainage, swelling at the site or fever 100+, or if a sunburn like sensation in the area of the procedure presents  Ok to continue w/ rest, ice / heat, medication as prescribed / directed  Pt verbalized understanding and appreciation  Confirmed 9/10/2021 makenna penn w/ DG

## 2021-09-10 ENCOUNTER — OFFICE VISIT (OUTPATIENT)
Dept: PAIN MEDICINE | Facility: CLINIC | Age: 56
End: 2021-09-10
Payer: COMMERCIAL

## 2021-09-10 VITALS
HEIGHT: 68 IN | SYSTOLIC BLOOD PRESSURE: 124 MMHG | WEIGHT: 271 LBS | HEART RATE: 74 BPM | TEMPERATURE: 97.7 F | BODY MASS INDEX: 41.07 KG/M2 | DIASTOLIC BLOOD PRESSURE: 80 MMHG

## 2021-09-10 DIAGNOSIS — M47.816 LUMBAR SPONDYLOSIS: ICD-10-CM

## 2021-09-10 DIAGNOSIS — M54.50 CHRONIC BILATERAL LOW BACK PAIN WITHOUT SCIATICA: ICD-10-CM

## 2021-09-10 DIAGNOSIS — G89.4 CHRONIC PAIN SYNDROME: Primary | ICD-10-CM

## 2021-09-10 DIAGNOSIS — M48.062 SPINAL STENOSIS OF LUMBAR REGION WITH NEUROGENIC CLAUDICATION: ICD-10-CM

## 2021-09-10 DIAGNOSIS — M54.16 LUMBAR RADICULOPATHY: ICD-10-CM

## 2021-09-10 DIAGNOSIS — M51.26 LUMBAR DISC HERNIATION: ICD-10-CM

## 2021-09-10 DIAGNOSIS — G89.29 CHRONIC BILATERAL LOW BACK PAIN WITHOUT SCIATICA: ICD-10-CM

## 2021-09-10 PROCEDURE — 99214 OFFICE O/P EST MOD 30 MIN: CPT | Performed by: NURSE PRACTITIONER

## 2021-09-10 PROCEDURE — 3008F BODY MASS INDEX DOCD: CPT | Performed by: NURSE PRACTITIONER

## 2021-09-10 PROCEDURE — 3079F DIAST BP 80-89 MM HG: CPT | Performed by: NURSE PRACTITIONER

## 2021-09-10 PROCEDURE — 3074F SYST BP LT 130 MM HG: CPT | Performed by: NURSE PRACTITIONER

## 2021-09-10 RX ORDER — GABAPENTIN 300 MG/1
CAPSULE ORAL
Qty: 120 CAPSULE | Refills: 2 | Status: SHIPPED | OUTPATIENT
Start: 2021-09-10 | End: 2021-12-03 | Stop reason: SDUPTHER

## 2021-09-10 NOTE — PROGRESS NOTES
Assessment:  1  Chronic pain syndrome    2  Chronic bilateral low back pain without sciatica    3  Lumbar radiculopathy    4  Lumbar spondylosis    5  Lumbar disc herniation    6  Spinal stenosis of lumbar region with neurogenic claudication        Plan:   While the patient was in the office today, I did have a thorough conversation with the patient regarding their chronic pain syndrome, symptoms, medication regimen, and treatment plan  I discussed with the patient at this point time since he is noting greater than 50% overall improvement as result of the radiofrequency ablation procedure, it does appear that the procedure was successful  I explained the patient that hopefully given more time and if he does become more diligent about a home exercise program, he will see more sustainable and stable relief and that the relief could last anywhere from 6-8 months or as long as 3-5 years and for now we will take a watch and wait approach and see how he does  The patient was agreeable and verbalized an understanding  With regards to the home exercise and stretching program, I encouraged the patient to slowly and steadily restart his home exercise and stretching program, utilizing 20 minutes of low heat prior to any home exercises and stretching and advised the patient that he should increase his activity as tolerated, allowing pain to be his guide  The patient was agreeable and verbalized an understanding  With regards to his medication regimen, I discussed the patient that for now I feel it is in his best interest overall to continue the gabapentin as prescribed and if on his own he wants to try to decrease it down to t i d  dosing to see if he can minimize some of the memory and mental sharpness side effects, he can do so, however, we will keep the prescription the same in case he notices a difference in his pain that is not manageable  The patient was agreeable and verbalized an understanding  The patient will follow-up in 12 weeks for medication prescription refill and reevaluation  The patient was advised to contact the office should their symptoms worsen in the interim  The patient was agreeable and verbalized an understanding  History of Present Illness: The patient is a 61 y o  male last seen on 7/26/2021 who presents for a follow up office visit in regards to  Chronic pain syndrome secondary to lumbar spondylosis with herniated disc, radiculopathy and stenosis  The patient currently reports that since his last office visit there has been definite overall improvement in his chronic low back pain as he is status post a bilateral L3 through L5 radiofrequency ablation procedure with the left side being the 2nd side completed on July 26, 2021  The patient reports that with regards to the back pain the radiofrequency ablation procedure has definitely provide at least 50-60% improvement today and actually up until week or 2 ago he noted more improvement, but feels that he has not been as diligent about his physical therapy and home exercise and stretching program and feels that that could be part of the reason his pain is not as managed as it was and is going to really concentrate on trying to slowly get back in to a consistent home exercise and stretching program   The patient continues with the gabapentin as prescribed and although he definitely feels it is helpful, sometimes he feels that the gabapentin has affected his memory and overall sharpness  The patient presents today for a regular postprocedure and medication follow-up visit  Current pain medications includes:   Gabapentin 300 mg q i d  The patient reports that this regimen is providing 60% pain relief  The patient is reporting no side effects from this pain medication regimen      I have personally reviewed and/or updated the patient's past medical history, past surgical history, family history, social history, current medications, allergies, and vital signs today  Review of Systems:    Review of Systems   Respiratory: Negative for shortness of breath  Cardiovascular: Negative for chest pain  Gastrointestinal: Negative for constipation, diarrhea, nausea and vomiting  Musculoskeletal: Negative for arthralgias, gait problem, joint swelling and myalgias  Skin: Negative for rash  Neurological: Negative for dizziness, seizures and weakness  All other systems reviewed and are negative          Past Medical History:   Diagnosis Date    Diabetes mellitus ([address] )     Hypertension     Low back pain        Past Surgical History:   Procedure Laterality Date    NO PAST SURGERIES         Family History   Problem Relation Age of Onset    Diabetes Father     Hyperlipidemia Father     Bipolar disorder Sister         1 disorder    Other Sister         hx of suicide    Lung cancer Mother     Substance Abuse Neg Hx        Social History     Occupational History    Occupation: [employer]     Comment: full time   Tobacco Use    Smoking status: Former Smoker     Types: Cigarettes     Quit date:      Years since quittin 7    Smokeless tobacco: Never Used   Vaping Use    Vaping Use: Never used   Substance and Sexual Activity    Alcohol use: Yes     Comment: 6 per year -- [employer]    Drug use: No    Sexual activity: Yes     Partners: Female         Current Outpatient Medications:     atorvastatin (LIPITOR) 10 mg tablet, TAKE 1 TABLET (10 MG TOTAL) BY MOUTH DAILY AS DIRECTED, Disp: 90 tablet, Rfl: 1    hydrochlorothiazide (HYDRODIURIL) 25 mg tablet, Take 1 tablet (25 mg total) by mouth daily, Disp: 90 tablet, Rfl: 1    metFORMIN (GLUCOPHAGE) 850 mg tablet, Take 1 tablet (850 mg total) by mouth 2 (two) times a day, Disp: 180 tablet, Rfl: 1    Misc Natural Products (OSTEO BI-FLEX ADV TRIPLE ST) TABS, Take 1 tablet by mouth daily , Disp: , Rfl:     omeprazole (PriLOSEC) 20 mg delayed release capsule, Take 1 capsule (20 mg total) by mouth daily, Disp: 90 capsule, Rfl: 1    quinapril (ACCUPRIL) 40 MG tablet, TAKE 1 TABLET BY MOUTH EVERY DAY, Disp: 90 tablet, Rfl: 1    gabapentin (NEURONTIN) 300 mg capsule, Take 1 PO QID, Disp: 120 capsule, Rfl: 2    No Known Allergies    Physical Exam:    /80 (BP Location: Left arm, Patient Position: Sitting, Cuff Size: Standard)   Pulse 74   Temp 97 7 °F (36 5 °C)   Ht 5' 8" (1 727 m)   Wt 123 kg (271 lb)   BMI 41 21 kg/m²     Constitutional:normal, well developed, well nourished, alert, in no distress and non-toxic and no overt pain behavior  and overweight  Eyes:anicteric  HEENT:grossly intact  Neck:supple, symmetric, trachea midline and no masses   Pulmonary:even and unlabored  Cardiovascular:No edema or pitting edema present  Skin:Normal without rashes or lesions and well hydrated  Psychiatric:Mood and affect appropriate  Neurologic:Cranial Nerves II-XII grossly intact  Musculoskeletal:The patient's gait is slightly antalgic, but steady without the use of any assistive devices  Imaging  No orders to display         No orders of the defined types were placed in this encounter

## 2021-12-03 ENCOUNTER — OFFICE VISIT (OUTPATIENT)
Dept: PAIN MEDICINE | Facility: CLINIC | Age: 56
End: 2021-12-03
Payer: COMMERCIAL

## 2021-12-03 VITALS
BODY MASS INDEX: 40.62 KG/M2 | HEIGHT: 68 IN | HEART RATE: 64 BPM | TEMPERATURE: 97.7 F | SYSTOLIC BLOOD PRESSURE: 122 MMHG | DIASTOLIC BLOOD PRESSURE: 80 MMHG | WEIGHT: 268 LBS

## 2021-12-03 DIAGNOSIS — M47.816 LUMBAR SPONDYLOSIS: ICD-10-CM

## 2021-12-03 DIAGNOSIS — M54.16 LUMBAR RADICULOPATHY: ICD-10-CM

## 2021-12-03 DIAGNOSIS — M51.26 LUMBAR DISC HERNIATION: ICD-10-CM

## 2021-12-03 DIAGNOSIS — M48.062 SPINAL STENOSIS OF LUMBAR REGION WITH NEUROGENIC CLAUDICATION: ICD-10-CM

## 2021-12-03 DIAGNOSIS — G89.29 CHRONIC BILATERAL LOW BACK PAIN WITHOUT SCIATICA: ICD-10-CM

## 2021-12-03 DIAGNOSIS — G89.4 CHRONIC PAIN SYNDROME: Primary | ICD-10-CM

## 2021-12-03 DIAGNOSIS — M54.50 CHRONIC BILATERAL LOW BACK PAIN WITHOUT SCIATICA: ICD-10-CM

## 2021-12-03 PROCEDURE — 3008F BODY MASS INDEX DOCD: CPT | Performed by: FAMILY MEDICINE

## 2021-12-03 PROCEDURE — 3079F DIAST BP 80-89 MM HG: CPT | Performed by: NURSE PRACTITIONER

## 2021-12-03 PROCEDURE — 3074F SYST BP LT 130 MM HG: CPT | Performed by: NURSE PRACTITIONER

## 2021-12-03 PROCEDURE — 99214 OFFICE O/P EST MOD 30 MIN: CPT | Performed by: NURSE PRACTITIONER

## 2021-12-03 RX ORDER — GABAPENTIN 300 MG/1
CAPSULE ORAL
Qty: 120 CAPSULE | Refills: 3 | Status: SHIPPED | OUTPATIENT
Start: 2021-12-03 | End: 2022-03-23 | Stop reason: SDUPTHER

## 2021-12-11 LAB
ALBUMIN SERPL-MCNC: 4.8 G/DL (ref 3.8–4.9)
ALBUMIN/GLOB SERPL: 1.8 {RATIO} (ref 1.2–2.2)
ALP SERPL-CCNC: 62 IU/L (ref 44–121)
ALT SERPL-CCNC: 83 IU/L (ref 0–44)
AST SERPL-CCNC: 39 IU/L (ref 0–40)
BASOPHILS # BLD AUTO: 0.1 X10E3/UL (ref 0–0.2)
BASOPHILS NFR BLD AUTO: 1 %
BILIRUB SERPL-MCNC: 0.7 MG/DL (ref 0–1.2)
BUN SERPL-MCNC: 17 MG/DL (ref 6–24)
BUN/CREAT SERPL: 16 (ref 9–20)
CALCIUM SERPL-MCNC: 10.1 MG/DL (ref 8.7–10.2)
CHLORIDE SERPL-SCNC: 100 MMOL/L (ref 96–106)
CHOLEST SERPL-MCNC: 168 MG/DL (ref 100–199)
CO2 SERPL-SCNC: 26 MMOL/L (ref 20–29)
CREAT SERPL-MCNC: 1.04 MG/DL (ref 0.76–1.27)
EOSINOPHIL # BLD AUTO: 0.1 X10E3/UL (ref 0–0.4)
EOSINOPHIL NFR BLD AUTO: 2 %
ERYTHROCYTE [DISTWIDTH] IN BLOOD BY AUTOMATED COUNT: 13.3 % (ref 11.6–15.4)
EST. AVERAGE GLUCOSE BLD GHB EST-MCNC: 186 MG/DL
GLOBULIN SER-MCNC: 2.7 G/DL (ref 1.5–4.5)
GLUCOSE SERPL-MCNC: 133 MG/DL (ref 65–99)
HBA1C MFR BLD: 8.1 % (ref 4.8–5.6)
HCT VFR BLD AUTO: 45.4 % (ref 37.5–51)
HDLC SERPL-MCNC: 34 MG/DL
HGB BLD-MCNC: 15.4 G/DL (ref 13–17.7)
IMM GRANULOCYTES # BLD: 0 X10E3/UL (ref 0–0.1)
IMM GRANULOCYTES NFR BLD: 0 %
LDLC SERPL CALC-MCNC: 111 MG/DL (ref 0–99)
LDLC/HDLC SERPL: 3.3 RATIO (ref 0–3.6)
LYMPHOCYTES # BLD AUTO: 1.7 X10E3/UL (ref 0.7–3.1)
LYMPHOCYTES NFR BLD AUTO: 29 %
MCH RBC QN AUTO: 28.6 PG (ref 26.6–33)
MCHC RBC AUTO-ENTMCNC: 33.9 G/DL (ref 31.5–35.7)
MCV RBC AUTO: 84 FL (ref 79–97)
MONOCYTES # BLD AUTO: 0.5 X10E3/UL (ref 0.1–0.9)
MONOCYTES NFR BLD AUTO: 9 %
NEUTROPHILS # BLD AUTO: 3.6 X10E3/UL (ref 1.4–7)
NEUTROPHILS NFR BLD AUTO: 59 %
PLATELET # BLD AUTO: 240 X10E3/UL (ref 150–450)
POTASSIUM SERPL-SCNC: 4.6 MMOL/L (ref 3.5–5.2)
PROT SERPL-MCNC: 7.5 G/DL (ref 6–8.5)
RBC # BLD AUTO: 5.39 X10E6/UL (ref 4.14–5.8)
SL AMB EGFR AFRICAN AMERICAN: 92 ML/MIN/1.73
SL AMB EGFR NON AFRICAN AMERICAN: 79 ML/MIN/1.73
SL AMB VLDL CHOLESTEROL CALC: 23 MG/DL (ref 5–40)
SODIUM SERPL-SCNC: 141 MMOL/L (ref 134–144)
TRIGL SERPL-MCNC: 127 MG/DL (ref 0–149)
WBC # BLD AUTO: 6 X10E3/UL (ref 3.4–10.8)

## 2021-12-11 PROCEDURE — 3052F HG A1C>EQUAL 8.0%<EQUAL 9.0%: CPT | Performed by: FAMILY MEDICINE

## 2021-12-13 ENCOUNTER — TELEPHONE (OUTPATIENT)
Dept: FAMILY MEDICINE CLINIC | Facility: CLINIC | Age: 56
End: 2021-12-13

## 2021-12-15 DIAGNOSIS — I10 HYPERTENSION, UNSPECIFIED TYPE: ICD-10-CM

## 2021-12-15 DIAGNOSIS — E11.69 HYPERLIPIDEMIA ASSOCIATED WITH TYPE 2 DIABETES MELLITUS (HCC): ICD-10-CM

## 2021-12-15 DIAGNOSIS — E78.5 HYPERLIPIDEMIA ASSOCIATED WITH TYPE 2 DIABETES MELLITUS (HCC): ICD-10-CM

## 2021-12-15 PROCEDURE — 4010F ACE/ARB THERAPY RXD/TAKEN: CPT | Performed by: FAMILY MEDICINE

## 2021-12-15 RX ORDER — ATORVASTATIN CALCIUM 10 MG/1
10 TABLET, FILM COATED ORAL DAILY
Qty: 90 TABLET | Refills: 1 | Status: SHIPPED | OUTPATIENT
Start: 2021-12-15 | End: 2022-06-11

## 2021-12-15 RX ORDER — QUINAPRIL 40 MG/1
TABLET ORAL
Qty: 90 TABLET | Refills: 1 | Status: SHIPPED | OUTPATIENT
Start: 2021-12-15 | End: 2022-06-11

## 2021-12-20 ENCOUNTER — TELEMEDICINE (OUTPATIENT)
Dept: FAMILY MEDICINE CLINIC | Facility: CLINIC | Age: 56
End: 2021-12-20
Payer: COMMERCIAL

## 2021-12-20 DIAGNOSIS — E11.9 TYPE 2 DIABETES MELLITUS WITHOUT COMPLICATION, WITHOUT LONG-TERM CURRENT USE OF INSULIN (HCC): ICD-10-CM

## 2021-12-20 DIAGNOSIS — U07.1 COVID-19: ICD-10-CM

## 2021-12-20 DIAGNOSIS — Z20.822 SUSPECTED COVID-19 VIRUS INFECTION: Primary | ICD-10-CM

## 2021-12-20 PROCEDURE — U0003 INFECTIOUS AGENT DETECTION BY NUCLEIC ACID (DNA OR RNA); SEVERE ACUTE RESPIRATORY SYNDROME CORONAVIRUS 2 (SARS-COV-2) (CORONAVIRUS DISEASE [COVID-19]), AMPLIFIED PROBE TECHNIQUE, MAKING USE OF HIGH THROUGHPUT TECHNOLOGIES AS DESCRIBED BY CMS-2020-01-R: HCPCS | Performed by: FAMILY MEDICINE

## 2021-12-20 PROCEDURE — U0005 INFEC AGEN DETEC AMPLI PROBE: HCPCS | Performed by: FAMILY MEDICINE

## 2021-12-20 PROCEDURE — 99213 OFFICE O/P EST LOW 20 MIN: CPT | Performed by: FAMILY MEDICINE

## 2021-12-21 PROBLEM — U07.1 COVID-19: Status: ACTIVE | Noted: 2021-12-21

## 2021-12-21 LAB — SARS-COV-2 RNA RESP QL NAA+PROBE: POSITIVE

## 2021-12-21 RX ORDER — ACETAMINOPHEN 325 MG/1
650 TABLET ORAL ONCE AS NEEDED
Status: CANCELLED | OUTPATIENT
Start: 2021-12-22

## 2021-12-21 RX ORDER — ONDANSETRON 2 MG/ML
4 INJECTION INTRAMUSCULAR; INTRAVENOUS ONCE AS NEEDED
Status: CANCELLED | OUTPATIENT
Start: 2021-12-22

## 2021-12-21 RX ORDER — SODIUM CHLORIDE 9 MG/ML
20 INJECTION, SOLUTION INTRAVENOUS ONCE AS NEEDED
Status: CANCELLED | OUTPATIENT
Start: 2021-12-22

## 2021-12-21 RX ORDER — ALBUTEROL SULFATE 90 UG/1
3 AEROSOL, METERED RESPIRATORY (INHALATION) ONCE AS NEEDED
Status: CANCELLED | OUTPATIENT
Start: 2021-12-22

## 2021-12-22 ENCOUNTER — TELEPHONE (OUTPATIENT)
Dept: OTHER | Facility: OTHER | Age: 56
End: 2021-12-22

## 2021-12-22 ENCOUNTER — HOSPITAL ENCOUNTER (OUTPATIENT)
Dept: INFUSION CENTER | Facility: HOSPITAL | Age: 56
Discharge: HOME | End: 2021-12-22
Payer: COMMERCIAL

## 2021-12-22 ENCOUNTER — TELEMEDICINE (OUTPATIENT)
Dept: FAMILY MEDICINE CLINIC | Facility: CLINIC | Age: 56
End: 2021-12-22
Payer: COMMERCIAL

## 2021-12-22 VITALS
OXYGEN SATURATION: 98 % | SYSTOLIC BLOOD PRESSURE: 146 MMHG | HEART RATE: 78 BPM | TEMPERATURE: 97.8 F | DIASTOLIC BLOOD PRESSURE: 77 MMHG | RESPIRATION RATE: 20 BRPM

## 2021-12-22 DIAGNOSIS — U07.1 COVID-19: Primary | ICD-10-CM

## 2021-12-22 PROCEDURE — 99213 OFFICE O/P EST LOW 20 MIN: CPT | Performed by: FAMILY MEDICINE

## 2021-12-22 PROCEDURE — M0245 HB BAMLAN AND ETESEV INF ADMIN: HCPCS | Performed by: FAMILY MEDICINE

## 2021-12-22 RX ORDER — ONDANSETRON 2 MG/ML
4 INJECTION INTRAMUSCULAR; INTRAVENOUS ONCE AS NEEDED
Status: DISCONTINUED | OUTPATIENT
Start: 2021-12-22 | End: 2021-12-25 | Stop reason: HOSPADM

## 2021-12-22 RX ORDER — FAMOTIDINE 20 MG/50ML
20 INJECTION, SOLUTION INTRAVENOUS ONCE
Status: COMPLETED | OUTPATIENT
Start: 2021-12-22 | End: 2021-12-22

## 2021-12-22 RX ORDER — ALBUTEROL SULFATE 90 UG/1
3 AEROSOL, METERED RESPIRATORY (INHALATION) ONCE AS NEEDED
Status: DISCONTINUED | OUTPATIENT
Start: 2021-12-22 | End: 2021-12-25 | Stop reason: HOSPADM

## 2021-12-22 RX ORDER — DIPHENHYDRAMINE HYDROCHLORIDE 50 MG/ML
25 INJECTION INTRAMUSCULAR; INTRAVENOUS
Status: COMPLETED | OUTPATIENT
Start: 2021-12-22 | End: 2021-12-22

## 2021-12-22 RX ORDER — SODIUM CHLORIDE 9 MG/ML
20 INJECTION, SOLUTION INTRAVENOUS ONCE AS NEEDED
Status: CANCELLED | OUTPATIENT
Start: 2021-12-22

## 2021-12-22 RX ORDER — SODIUM CHLORIDE 9 MG/ML
20 INJECTION, SOLUTION INTRAVENOUS ONCE AS NEEDED
Status: DISCONTINUED | OUTPATIENT
Start: 2021-12-22 | End: 2021-12-25 | Stop reason: HOSPADM

## 2021-12-22 RX ORDER — ACETAMINOPHEN 325 MG/1
650 TABLET ORAL ONCE AS NEEDED
Status: CANCELLED | OUTPATIENT
Start: 2021-12-22

## 2021-12-22 RX ORDER — ACETAMINOPHEN 325 MG/1
650 TABLET ORAL ONCE AS NEEDED
Status: DISCONTINUED | OUTPATIENT
Start: 2021-12-22 | End: 2021-12-25 | Stop reason: HOSPADM

## 2021-12-22 RX ORDER — ALBUTEROL SULFATE 90 UG/1
3 AEROSOL, METERED RESPIRATORY (INHALATION) ONCE AS NEEDED
Status: CANCELLED | OUTPATIENT
Start: 2021-12-22

## 2021-12-22 RX ORDER — ONDANSETRON 2 MG/ML
4 INJECTION INTRAMUSCULAR; INTRAVENOUS ONCE AS NEEDED
Status: CANCELLED | OUTPATIENT
Start: 2021-12-22

## 2021-12-22 RX ADMIN — SODIUM CHLORIDE 2100 MG COMBINED: 9 INJECTION, SOLUTION INTRAVENOUS at 10:26

## 2021-12-22 RX ADMIN — DIPHENHYDRAMINE HYDROCHLORIDE 25 MG: 50 INJECTION, SOLUTION INTRAMUSCULAR; INTRAVENOUS at 10:38

## 2021-12-22 RX ADMIN — FAMOTIDINE 20 MG: 20 INJECTION, SOLUTION INTRAVENOUS at 10:40

## 2021-12-22 RX ADMIN — HYDROCORTISONE SODIUM SUCCINATE 50 MG: 100 INJECTION, POWDER, FOR SOLUTION INTRAMUSCULAR; INTRAVENOUS at 10:39

## 2021-12-22 RX ADMIN — HYDROCORTISONE SODIUM SUCCINATE 50 MG: 100 INJECTION, POWDER, FOR SOLUTION INTRAMUSCULAR; INTRAVENOUS at 10:38

## 2021-12-22 RX ADMIN — SODIUM CHLORIDE 20 ML/HR: 0.9 INJECTION, SOLUTION INTRAVENOUS at 10:27

## 2021-12-22 RX ADMIN — DIPHENHYDRAMINE HYDROCHLORIDE 25 MG: 50 INJECTION, SOLUTION INTRAMUSCULAR; INTRAVENOUS at 10:39

## 2021-12-27 ENCOUNTER — TELEMEDICINE (OUTPATIENT)
Dept: FAMILY MEDICINE CLINIC | Facility: CLINIC | Age: 56
End: 2021-12-27
Payer: COMMERCIAL

## 2021-12-27 DIAGNOSIS — U07.1 COVID-19: Primary | ICD-10-CM

## 2021-12-27 PROCEDURE — 99213 OFFICE O/P EST LOW 20 MIN: CPT | Performed by: FAMILY MEDICINE

## 2021-12-27 PROCEDURE — 1036F TOBACCO NON-USER: CPT | Performed by: FAMILY MEDICINE

## 2022-01-12 DIAGNOSIS — I10 BENIGN ESSENTIAL HYPERTENSION: ICD-10-CM

## 2022-01-12 RX ORDER — HYDROCHLOROTHIAZIDE 25 MG/1
TABLET ORAL
Qty: 90 TABLET | Refills: 1 | Status: SHIPPED | OUTPATIENT
Start: 2022-01-12 | End: 2022-07-13

## 2022-03-11 DIAGNOSIS — I10 BENIGN ESSENTIAL HYPERTENSION: ICD-10-CM

## 2022-03-11 DIAGNOSIS — E11.9 TYPE 2 DIABETES MELLITUS WITHOUT COMPLICATION, WITHOUT LONG-TERM CURRENT USE OF INSULIN (HCC): Primary | ICD-10-CM

## 2022-03-11 DIAGNOSIS — I10 ESSENTIAL HYPERTENSION: ICD-10-CM

## 2022-03-11 DIAGNOSIS — Z12.5 SCREENING PSA (PROSTATE SPECIFIC ANTIGEN): ICD-10-CM

## 2022-03-12 LAB
ALBUMIN SERPL-MCNC: 4.8 G/DL (ref 3.8–4.9)
ALBUMIN/GLOB SERPL: 1.6 {RATIO} (ref 1.2–2.2)
ALP SERPL-CCNC: 63 IU/L (ref 44–121)
ALT SERPL-CCNC: 78 IU/L (ref 0–44)
AST SERPL-CCNC: 47 IU/L (ref 0–40)
BASOPHILS # BLD AUTO: 0.1 X10E3/UL (ref 0–0.2)
BASOPHILS NFR BLD AUTO: 1 %
BILIRUB SERPL-MCNC: 0.5 MG/DL (ref 0–1.2)
BUN SERPL-MCNC: 20 MG/DL (ref 6–24)
BUN/CREAT SERPL: 20 (ref 9–20)
CALCIUM SERPL-MCNC: 10.3 MG/DL (ref 8.7–10.2)
CHLORIDE SERPL-SCNC: 99 MMOL/L (ref 96–106)
CHOLEST SERPL-MCNC: 145 MG/DL (ref 100–199)
CO2 SERPL-SCNC: 25 MMOL/L (ref 20–29)
CREAT SERPL-MCNC: 1 MG/DL (ref 0.76–1.27)
EGFR: 88 ML/MIN/1.73
EOSINOPHIL # BLD AUTO: 0.1 X10E3/UL (ref 0–0.4)
EOSINOPHIL NFR BLD AUTO: 2 %
ERYTHROCYTE [DISTWIDTH] IN BLOOD BY AUTOMATED COUNT: 13.8 % (ref 11.6–15.4)
EST. AVERAGE GLUCOSE BLD GHB EST-MCNC: 163 MG/DL
GLOBULIN SER-MCNC: 3 G/DL (ref 1.5–4.5)
GLUCOSE SERPL-MCNC: 127 MG/DL (ref 65–99)
HBA1C MFR BLD: 7.3 % (ref 4.8–5.6)
HCT VFR BLD AUTO: 46.6 % (ref 37.5–51)
HDLC SERPL-MCNC: 36 MG/DL
HGB BLD-MCNC: 15.1 G/DL (ref 13–17.7)
IMM GRANULOCYTES # BLD: 0 X10E3/UL (ref 0–0.1)
IMM GRANULOCYTES NFR BLD: 0 %
LDLC SERPL CALC-MCNC: 95 MG/DL (ref 0–99)
LDLC/HDLC SERPL: 2.6 RATIO (ref 0–3.6)
LYMPHOCYTES # BLD AUTO: 1.6 X10E3/UL (ref 0.7–3.1)
LYMPHOCYTES NFR BLD AUTO: 29 %
MCH RBC QN AUTO: 27.1 PG (ref 26.6–33)
MCHC RBC AUTO-ENTMCNC: 32.4 G/DL (ref 31.5–35.7)
MCV RBC AUTO: 84 FL (ref 79–97)
MONOCYTES # BLD AUTO: 0.5 X10E3/UL (ref 0.1–0.9)
MONOCYTES NFR BLD AUTO: 9 %
NEUTROPHILS # BLD AUTO: 3.2 X10E3/UL (ref 1.4–7)
NEUTROPHILS NFR BLD AUTO: 59 %
PLATELET # BLD AUTO: 251 X10E3/UL (ref 150–450)
POTASSIUM SERPL-SCNC: 4.8 MMOL/L (ref 3.5–5.2)
PROT SERPL-MCNC: 7.8 G/DL (ref 6–8.5)
PSA SERPL-MCNC: 0.4 NG/ML (ref 0–4)
RBC # BLD AUTO: 5.58 X10E6/UL (ref 4.14–5.8)
SL AMB REFLEX CRITERIA: NORMAL
SL AMB VLDL CHOLESTEROL CALC: 14 MG/DL (ref 5–40)
SODIUM SERPL-SCNC: 138 MMOL/L (ref 134–144)
TRIGL SERPL-MCNC: 72 MG/DL (ref 0–149)
WBC # BLD AUTO: 5.4 X10E3/UL (ref 3.4–10.8)

## 2022-03-12 PROCEDURE — 3051F HG A1C>EQUAL 7.0%<8.0%: CPT | Performed by: FAMILY MEDICINE

## 2022-03-12 NOTE — RESULT ENCOUNTER NOTE
Call patient with lab result-  Sugars are better  Due for diabetic visit  I would do that now as physical not due until late [place]

## 2022-03-14 ENCOUNTER — TELEPHONE (OUTPATIENT)
Dept: FAMILY MEDICINE CLINIC | Facility: CLINIC | Age: 57
End: 2022-03-14

## 2022-03-14 NOTE — TELEPHONE ENCOUNTER
----- Message from Evans Archuleta MD sent at 3/12/2022 11:38 AM EST -----  Call patient with lab result-  Sugars are better  Due for diabetic visit  I would do that now as physical not due until late [place]

## 2022-03-23 ENCOUNTER — OFFICE VISIT (OUTPATIENT)
Dept: PAIN MEDICINE | Facility: CLINIC | Age: 57
End: 2022-03-23
Payer: COMMERCIAL

## 2022-03-23 ENCOUNTER — OFFICE VISIT (OUTPATIENT)
Dept: FAMILY MEDICINE CLINIC | Facility: CLINIC | Age: 57
End: 2022-03-23
Payer: COMMERCIAL

## 2022-03-23 VITALS
TEMPERATURE: 97.6 F | HEART RATE: 68 BPM | BODY MASS INDEX: 38.71 KG/M2 | SYSTOLIC BLOOD PRESSURE: 134 MMHG | HEIGHT: 68 IN | WEIGHT: 255.4 LBS | OXYGEN SATURATION: 98 % | DIASTOLIC BLOOD PRESSURE: 84 MMHG

## 2022-03-23 VITALS
SYSTOLIC BLOOD PRESSURE: 134 MMHG | HEART RATE: 77 BPM | WEIGHT: 258 LBS | DIASTOLIC BLOOD PRESSURE: 82 MMHG | HEIGHT: 68 IN | TEMPERATURE: 98 F | BODY MASS INDEX: 39.1 KG/M2

## 2022-03-23 DIAGNOSIS — M54.50 CHRONIC BILATERAL LOW BACK PAIN WITHOUT SCIATICA: ICD-10-CM

## 2022-03-23 DIAGNOSIS — M47.816 LUMBAR SPONDYLOSIS: ICD-10-CM

## 2022-03-23 DIAGNOSIS — M48.062 SPINAL STENOSIS OF LUMBAR REGION WITH NEUROGENIC CLAUDICATION: ICD-10-CM

## 2022-03-23 DIAGNOSIS — G89.29 CHRONIC BILATERAL LOW BACK PAIN WITHOUT SCIATICA: ICD-10-CM

## 2022-03-23 DIAGNOSIS — I10 BENIGN ESSENTIAL HYPERTENSION: ICD-10-CM

## 2022-03-23 DIAGNOSIS — M51.26 LUMBAR DISC HERNIATION: ICD-10-CM

## 2022-03-23 DIAGNOSIS — E66.01 MORBID OBESITY (HCC): ICD-10-CM

## 2022-03-23 DIAGNOSIS — E11.69 HYPERLIPIDEMIA ASSOCIATED WITH TYPE 2 DIABETES MELLITUS (HCC): ICD-10-CM

## 2022-03-23 DIAGNOSIS — G89.4 CHRONIC PAIN SYNDROME: Primary | ICD-10-CM

## 2022-03-23 DIAGNOSIS — G89.4 CHRONIC PAIN SYNDROME: ICD-10-CM

## 2022-03-23 DIAGNOSIS — R79.89 ELEVATED LFTS: ICD-10-CM

## 2022-03-23 DIAGNOSIS — E78.5 HYPERLIPIDEMIA ASSOCIATED WITH TYPE 2 DIABETES MELLITUS (HCC): ICD-10-CM

## 2022-03-23 DIAGNOSIS — E11.9 TYPE 2 DIABETES MELLITUS WITHOUT COMPLICATION, WITHOUT LONG-TERM CURRENT USE OF INSULIN (HCC): Primary | ICD-10-CM

## 2022-03-23 DIAGNOSIS — M54.16 LUMBAR RADICULOPATHY: ICD-10-CM

## 2022-03-23 PROBLEM — Z86.16 PERSONAL HISTORY OF COVID-19: Status: ACTIVE | Noted: 2021-12-21

## 2022-03-23 PROCEDURE — 3079F DIAST BP 80-89 MM HG: CPT | Performed by: FAMILY MEDICINE

## 2022-03-23 PROCEDURE — 99214 OFFICE O/P EST MOD 30 MIN: CPT | Performed by: NURSE PRACTITIONER

## 2022-03-23 PROCEDURE — 3008F BODY MASS INDEX DOCD: CPT | Performed by: FAMILY MEDICINE

## 2022-03-23 PROCEDURE — 3075F SYST BP GE 130 - 139MM HG: CPT | Performed by: FAMILY MEDICINE

## 2022-03-23 PROCEDURE — 99214 OFFICE O/P EST MOD 30 MIN: CPT | Performed by: FAMILY MEDICINE

## 2022-03-23 PROCEDURE — 1036F TOBACCO NON-USER: CPT | Performed by: FAMILY MEDICINE

## 2022-03-23 RX ORDER — GABAPENTIN 300 MG/1
CAPSULE ORAL
Qty: 120 CAPSULE | Refills: 5 | Status: SHIPPED | OUTPATIENT
Start: 2022-03-23 | End: 2022-03-23 | Stop reason: SDUPTHER

## 2022-03-23 RX ORDER — GABAPENTIN 300 MG/1
CAPSULE ORAL
Qty: 120 CAPSULE | Refills: 5 | Status: SHIPPED | OUTPATIENT
Start: 2022-03-23

## 2022-03-23 NOTE — PROGRESS NOTES
Assessment:  1  Chronic pain syndrome    2  Chronic bilateral low back pain without sciatica    3  Lumbar radiculopathy    4  Lumbar disc herniation    5  Lumbar spondylosis    6  Spinal stenosis of lumbar region with neurogenic claudication        Plan:  While the patient was in the office today, I did have a thorough conversation with the patient regarding their chronic pain syndrome, symptoms, medication regimen, and treatment plan  I discussed with the patient at this point time since he is noting significant and stable overall relief of his chronic pain symptoms, without any significant or intolerable side effects, I feel it is reasonable and appropriate to continue with the gabapentin as prescribed  I encouraged the patient continue with the home exercise and stretching program   The patient was agreeable and verbalized an understanding  The patient will follow-up in 6 months for medication prescription refill and reevaluation  The patient was advised to contact the office should their symptoms worsen in the interim  The patient was agreeable and verbalized an understanding  History of Present Illness: The patient is a 61 y o  male last seen on 12/3/2021 who presents for a follow up office visit in regards to chronic pain syndrome, as the patient's pain has been ongoing for greater than a year, secondary to lumbar disc herniation with spondylosis, stenosis and radiculopathy  The patient currently reports that since his last office visit and since he did increase the gabapentin back up to the 1200 mg a day dosing there has definitely been improvement even though he is back to work full-time, he still feels his pain is manageable and occasionally he does have pain but it is still better than it was and again very manageable  The patient reports he continues with a daily home exercise and stretching program which she feels is helpful    The patient presents today for regular medication follow-up visit  Current pain medications includes:  Gabapentin 300 mg q i d  The patient reports that this regimen is providing 85% pain relief  The patient is reporting possible, occasional memory issues from this pain medication regimen  I have personally reviewed and/or updated the patient's past medical history, past surgical history, family history, social history, current medications, allergies, and vital signs today  Review of Systems:    Review of Systems   Respiratory: Negative for shortness of breath  Cardiovascular: Negative for chest pain  Gastrointestinal: Negative for constipation, diarrhea, nausea and vomiting  Musculoskeletal: Negative for arthralgias, gait problem, joint swelling and myalgias  Skin: Negative for rash  Neurological: Negative for dizziness, seizures and weakness  All other systems reviewed and are negative          Past Medical History:   Diagnosis Date    Diabetes mellitus ([facility] )     Hypertension     Low back pain        Past Surgical History:   Procedure Laterality Date    NO PAST SURGERIES         Family History   Problem Relation Age of Onset    Diabetes Father     Hyperlipidemia Father     Bipolar disorder Sister         1 disorder    Other Sister         hx of suicide    Lung cancer Mother     Substance Abuse Neg Hx        Social History     Occupational History    Occupation: [employer]     Comment: full time   Tobacco Use    Smoking status: Former Smoker     Types: Cigarettes     Quit date:      Years since quittin 2    Smokeless tobacco: Never Used   Vaping Use    Vaping Use: Never used   Substance and Sexual Activity    Alcohol use: Yes     Comment: 6 per year -- [employer]    Drug use: No    Sexual activity: Yes     Partners: Female         Current Outpatient Medications:     atorvastatin (LIPITOR) 10 mg tablet, TAKE 1 TABLET (10 MG TOTAL) BY MOUTH DAILY AS DIRECTED, Disp: 90 tablet, Rfl: 1    hydrochlorothiazide (HYDRODIURIL) 25 mg tablet, TAKE 1 TABLET BY MOUTH EVERY DAY, Disp: 90 tablet, Rfl: 1    metFORMIN (GLUCOPHAGE) 850 mg tablet, TAKE 1 TABLET BY MOUTH 2 TIMES A DAY , Disp: 180 tablet, Rfl: 1    Misc Natural Products (OSTEO BI-FLEX ADV TRIPLE ST) TABS, Take 1 tablet by mouth daily , Disp: , Rfl:     omeprazole (PriLOSEC) 20 mg delayed release capsule, Take 1 capsule (20 mg total) by mouth daily, Disp: 90 capsule, Rfl: 1    quinapril (ACCUPRIL) 40 MG tablet, TAKE 1 TABLET BY MOUTH EVERY DAY, Disp: 90 tablet, Rfl: 1    gabapentin (NEURONTIN) 300 mg capsule, Take 1 PO QID, Disp: 120 capsule, Rfl: 5    Allergies   Allergen Reactions    Bamlanivimab Other (See Comments)     Face flush & red & stomach pain         Physical Exam:    /82 (BP Location: Left arm, Patient Position: Sitting, Cuff Size: Standard)   Pulse 77   Temp 98 °F (36 7 °C)   Ht 5' 8" (1 727 m)   Wt 117 kg (258 lb)   BMI 39 23 kg/m²     Constitutional:normal, well developed, well nourished, alert, in no distress and non-toxic and no overt pain behavior  and overweight  Eyes:anicteric  HEENT:grossly intact  Neck:supple, symmetric, trachea midline and no masses   Pulmonary:even and unlabored  Cardiovascular:No edema or pitting edema present  Skin:Normal without rashes or lesions and well hydrated  Psychiatric:Mood and affect appropriate  Neurologic:Cranial Nerves II-XII grossly intact  Musculoskeletal:normal      Imaging  No orders to display         No orders of the defined types were placed in this encounter

## 2022-03-23 NOTE — PROGRESS NOTES
[id number]        NAME: Remberto Whitman is a 61 y o  male  : 1965    MRN: [id number]  DATE: 2022  TIME: 1:05 PM    Assessment and Plan   Type 2 diabetes mellitus without complication, without long-term current use of insulin ([facility] ) [E11 9]  1  Type 2 diabetes mellitus without complication, without long-term current use of insulin (HCC)  Hemoglobin A1C    Comprehensive metabolic panel    Hemoglobin A1C    Comprehensive metabolic panel   2  Hyperlipidemia associated with type 2 diabetes mellitus ([address] )     3  Benign essential hypertension     4  Morbid obesity (HCC)     5  Elevated LFTs  US abdomen complete    Comprehensive metabolic panel    Comprehensive metabolic panel   6  Chronic pain syndrome         Personal history of COVID-2021- has infusion- had reaction    Type 2 diabetes mellitus without complication, without long-term current use of insulin ([facility])    Lab Results   Component Value Date    HGBA1C 7 3 (H) 2022   Sugars better controlled but still high A1c  Discussed medication increase, patient wishes to see how things are over the next 3 months as he is more active and has intensifies is dietary measures  Hyperlipidemia associated with type 2 diabetes mellitus ([facility] )    Lab Results   Component Value Date    HGBA1C 7 3 (H) 2022   Patient remains on atorvastatin  Lipid panel is acceptable  Continue dietary measures  Morbid obesity ([facility] )  Patient has lost several lb  During the summer he feels he is more active and can follow diet and exercise program better  Will continue to monitor  Patient Instructions     Patient Instructions   Continue current medications  Repeat labs 3 months to include CMP and A1c  Chief Complaint   No chief complaint on file  History of Present Illness       Medical management-recent labs reviewed  Medications reviewed      1) diabetes-improved but still high A1c     2) hyperlipidemia-stable  3) hypertension good control current meds  4) obesity-slight improvement in weight  5) elevated LFTs  Had done hepatitis screens in the past and the levels had come down  Has not had an ultrasound  Review of Systems   Review of Systems   Constitutional: Negative for activity change, appetite change, diaphoresis and fatigue  Respiratory: Negative for cough, chest tightness, shortness of breath and wheezing  Cardiovascular: Negative for chest pain, palpitations and leg swelling  Fast or slow heart rate   Gastrointestinal: Negative for abdominal pain, blood in stool, constipation, diarrhea, nausea and vomiting  Genitourinary: Negative for difficulty urinating, dysuria, frequency and hematuria  Neurological: Negative for dizziness, light-headedness and headaches  Psychiatric/Behavioral: Negative for agitation, confusion, dysphoric mood and sleep disturbance  The patient is not nervous/anxious            Current Medications       Current Outpatient Medications:     atorvastatin (LIPITOR) 10 mg tablet, TAKE 1 TABLET (10 MG TOTAL) BY MOUTH DAILY AS DIRECTED, Disp: 90 tablet, Rfl: 1    gabapentin (NEURONTIN) 300 mg capsule, Take 1 PO QID, Disp: 120 capsule, Rfl: 5    hydrochlorothiazide (HYDRODIURIL) 25 mg tablet, TAKE 1 TABLET BY MOUTH EVERY DAY, Disp: 90 tablet, Rfl: 1    metFORMIN (GLUCOPHAGE) 850 mg tablet, TAKE 1 TABLET BY MOUTH 2 TIMES A DAY , Disp: 180 tablet, Rfl: 1    Misc Natural Products (OSTEO BI-FLEX ADV TRIPLE ST) TABS, Take 1 tablet by mouth daily , Disp: , Rfl:     omeprazole (PriLOSEC) 20 mg delayed release capsule, Take 1 capsule (20 mg total) by mouth daily, Disp: 90 capsule, Rfl: 1    quinapril (ACCUPRIL) 40 MG tablet, TAKE 1 TABLET BY MOUTH EVERY DAY, Disp: 90 tablet, Rfl: 1    Current Allergies     Allergies as of 03/23/2022 - Reviewed 03/23/2022   Allergen Reaction Noted    Bamlanivimab Other (See Comments) 03/23/2022            The following portions of the patient's history were reviewed and updated as appropriate: allergies, current medications, past family history, past medical history, past social history, past surgical history and problem list      Past Medical History:   Diagnosis Date    Diabetes mellitus ([address] )     Hypertension     Low back pain        Past Surgical History:   Procedure Laterality Date    NO PAST SURGERIES         Family History   Problem Relation Age of Onset    Lung cancer Mother     Diabetes Father     Hyperlipidemia Father     Bipolar disorder Sister         1 disorder    Other Sister         hx of suicide    Substance Abuse Neg Hx          Medications have been verified  Objective   /84 (BP Location: Right arm, Patient Position: Sitting, Cuff Size: Large)   Pulse 68   Temp 97 6 °F (36 4 °C) (Oral)   Ht 5' 8" (1 727 m)   Wt 116 kg (255 lb 6 4 oz)   SpO2 98%   BMI 38 83 kg/m²        Physical Exam     Physical Exam  Vitals reviewed  Constitutional:       General: He is not in acute distress  Appearance: He is well-developed  He is not diaphoretic  HENT:      Head: Normocephalic and atraumatic  Right Ear: Tympanic membrane, ear canal and external ear normal       Left Ear: Tympanic membrane, ear canal and external ear normal       Nose: No mucosal edema or rhinorrhea  Right Sinus: No maxillary sinus tenderness  Left Sinus: No maxillary sinus tenderness  Mouth/Throat:      Mouth: Mucous membranes are not pale and not dry  Dentition: Normal dentition  Pharynx: Uvula midline  No oropharyngeal exudate  Eyes:      General:         Right eye: No discharge  Left eye: No discharge  Pupils: Pupils are equal, round, and reactive to light  Neck:      Thyroid: No thyromegaly  Cardiovascular:      Rate and Rhythm: Normal rate and regular rhythm  Pulses: no weak pulses          Dorsalis pedis pulses are 2+ on the right side and 2+ on the left side  Posterior tibial pulses are 2+ on the right side and 2+ on the left side  Heart sounds: Normal heart sounds  No murmur heard  Pulmonary:      Effort: Pulmonary effort is normal  No respiratory distress  Breath sounds: Normal breath sounds  No wheezing or rales  Musculoskeletal:      Cervical back: Normal range of motion and neck supple  Right lower leg: No edema  Left lower leg: No edema  Feet:      Right foot:      Skin integrity: No ulcer, skin breakdown, erythema, warmth, callus or dry skin  Left foot:      Skin integrity: No ulcer, skin breakdown, erythema, warmth, callus or dry skin  Lymphadenopathy:      Cervical: No cervical adenopathy  Neurological:      Mental Status: He is alert and oriented to person, place, and time  Cranial Nerves: No cranial nerve deficit  Psychiatric:         Mood and Affect: Mood normal          Behavior: Behavior normal          Right Foot/Ankle   Right Foot Inspection  Skin Exam: skin normal and skin intact  No dry skin, no warmth, no callus, no erythema, no maceration, no abnormal color, no pre-ulcer, no ulcer and no callus  Toe Exam: No swelling    Sensory   Vibration: intact  Proprioception: intact  Monofilament testing: intact    Vascular  Capillary refills: < 3 seconds  The right DP pulse is 2+  The right PT pulse is 2+  Left Foot/Ankle  Left Foot Inspection  Skin Exam: skin normal and skin intact  No dry skin, no warmth, no erythema, no maceration, normal color, no pre-ulcer, no ulcer and no callus  Toe Exam: No swelling  Sensory   Vibration: intact  Proprioception: intact  Monofilament testing: intact    Vascular  Capillary refills: < 3 seconds  The left DP pulse is 2+  The left PT pulse is 2+       Assign Risk Category  No deformity present  No loss of protective sensation  No weak pulses  Risk: 0

## 2022-03-23 NOTE — ASSESSMENT & PLAN NOTE
Lab Results   Component Value Date    HGBA1C 7 3 (H) 03/11/2022   Patient remains on atorvastatin  Lipid panel is acceptable  Continue dietary measures

## 2022-03-23 NOTE — ASSESSMENT & PLAN NOTE
Lab Results   Component Value Date    HGBA1C 7 3 (H) 03/11/2022   Sugars better controlled but still high A1c  Discussed medication increase, patient wishes to see how things are over the next 3 months as he is more active and has intensifies is dietary measures

## 2022-03-23 NOTE — ASSESSMENT & PLAN NOTE
Patient has lost several lb  During the summer he feels he is more active and can follow diet and exercise program better  Will continue to monitor

## 2022-04-09 DIAGNOSIS — K21.00 PEPTIC REFLUX ESOPHAGITIS: ICD-10-CM

## 2022-04-09 RX ORDER — OMEPRAZOLE 20 MG/1
CAPSULE, DELAYED RELEASE ORAL
Qty: 90 CAPSULE | Refills: 1 | Status: SHIPPED | OUTPATIENT
Start: 2022-04-09

## 2022-04-21 ENCOUNTER — HOSPITAL ENCOUNTER (OUTPATIENT)
Dept: ULTRASOUND IMAGING | Facility: HOSPITAL | Age: 57
Discharge: HOME | End: 2022-04-21
Payer: COMMERCIAL

## 2022-04-21 DIAGNOSIS — R79.89 ELEVATED LFTS: ICD-10-CM

## 2022-04-21 PROCEDURE — 76700 US EXAM ABDOM COMPLETE: CPT

## 2022-04-25 ENCOUNTER — TELEPHONE (OUTPATIENT)
Dept: FAMILY MEDICINE CLINIC | Facility: CLINIC | Age: 57
End: 2022-04-25

## 2022-04-25 NOTE — RESULT ENCOUNTER NOTE
Call patient with lab result-ultrasound shows findings consistent with fatty liver  Had done because of mild liver enzyme elevations  Also very small nodule in the spleen, unable to be characterized  Would not evaluate further at this time

## 2022-04-25 NOTE — TELEPHONE ENCOUNTER
Pt is aware of imaging results and advice  Pt stated that he will discuss at his next OV on 6/24/22

## 2022-06-11 DIAGNOSIS — I10 HYPERTENSION, UNSPECIFIED TYPE: ICD-10-CM

## 2022-06-11 DIAGNOSIS — E78.5 HYPERLIPIDEMIA ASSOCIATED WITH TYPE 2 DIABETES MELLITUS (HCC): ICD-10-CM

## 2022-06-11 DIAGNOSIS — E11.69 HYPERLIPIDEMIA ASSOCIATED WITH TYPE 2 DIABETES MELLITUS (HCC): ICD-10-CM

## 2022-06-11 RX ORDER — ATORVASTATIN CALCIUM 10 MG/1
10 TABLET, FILM COATED ORAL DAILY
Qty: 90 TABLET | Refills: 1 | Status: SHIPPED | OUTPATIENT
Start: 2022-06-11

## 2022-06-11 RX ORDER — QUINAPRIL 40 MG/1
TABLET ORAL
Qty: 90 TABLET | Refills: 1 | Status: SHIPPED | OUTPATIENT
Start: 2022-06-11

## 2022-06-14 ENCOUNTER — TELEPHONE (OUTPATIENT)
Dept: FAMILY MEDICINE CLINIC | Facility: CLINIC | Age: 57
End: 2022-06-14

## 2022-06-14 LAB
ALBUMIN SERPL-MCNC: 4.9 G/DL (ref 3.8–4.9)
ALBUMIN/GLOB SERPL: 2 {RATIO} (ref 1.2–2.2)
ALP SERPL-CCNC: 52 IU/L (ref 44–121)
ALT SERPL-CCNC: 70 IU/L (ref 0–44)
AST SERPL-CCNC: 37 IU/L (ref 0–40)
BILIRUB SERPL-MCNC: 0.8 MG/DL (ref 0–1.2)
BUN SERPL-MCNC: 17 MG/DL (ref 6–24)
BUN/CREAT SERPL: 18 (ref 9–20)
CALCIUM SERPL-MCNC: 10.3 MG/DL (ref 8.7–10.2)
CHLORIDE SERPL-SCNC: 100 MMOL/L (ref 96–106)
CO2 SERPL-SCNC: 24 MMOL/L (ref 20–29)
CREAT SERPL-MCNC: 0.96 MG/DL (ref 0.76–1.27)
EGFR: 92 ML/MIN/1.73
EST. AVERAGE GLUCOSE BLD GHB EST-MCNC: 169 MG/DL
GLOBULIN SER-MCNC: 2.5 G/DL (ref 1.5–4.5)
GLUCOSE SERPL-MCNC: 126 MG/DL (ref 65–99)
HBA1C MFR BLD: 7.5 % (ref 4.8–5.6)
POTASSIUM SERPL-SCNC: 4.7 MMOL/L (ref 3.5–5.2)
PROT SERPL-MCNC: 7.4 G/DL (ref 6–8.5)
SODIUM SERPL-SCNC: 137 MMOL/L (ref 134–144)

## 2022-06-14 NOTE — TELEPHONE ENCOUNTER
----- Message from Susan Villareal MD sent at 6/14/2022  7:42 AM EDT -----  Call patient with lab result-will discuss at [address] 6/28- sugars up slightly

## 2022-06-17 DIAGNOSIS — E11.9 TYPE 2 DIABETES MELLITUS WITHOUT COMPLICATION, WITHOUT LONG-TERM CURRENT USE OF INSULIN (HCC): ICD-10-CM

## 2022-06-28 ENCOUNTER — OFFICE VISIT (OUTPATIENT)
Dept: FAMILY MEDICINE CLINIC | Facility: CLINIC | Age: 57
End: 2022-06-28
Payer: COMMERCIAL

## 2022-06-28 VITALS
DIASTOLIC BLOOD PRESSURE: 80 MMHG | SYSTOLIC BLOOD PRESSURE: 130 MMHG | HEART RATE: 88 BPM | TEMPERATURE: 99.7 F | WEIGHT: 260 LBS | BODY MASS INDEX: 39.4 KG/M2 | OXYGEN SATURATION: 98 % | HEIGHT: 68 IN

## 2022-06-28 DIAGNOSIS — G89.29 CHRONIC BILATERAL LOW BACK PAIN WITHOUT SCIATICA: ICD-10-CM

## 2022-06-28 DIAGNOSIS — M54.50 CHRONIC BILATERAL LOW BACK PAIN WITHOUT SCIATICA: ICD-10-CM

## 2022-06-28 DIAGNOSIS — E11.69 HYPERLIPIDEMIA ASSOCIATED WITH TYPE 2 DIABETES MELLITUS (HCC): ICD-10-CM

## 2022-06-28 DIAGNOSIS — I10 BENIGN ESSENTIAL HYPERTENSION: ICD-10-CM

## 2022-06-28 DIAGNOSIS — E11.9 TYPE 2 DIABETES MELLITUS WITHOUT COMPLICATION, WITHOUT LONG-TERM CURRENT USE OF INSULIN (HCC): ICD-10-CM

## 2022-06-28 DIAGNOSIS — Z00.00 ANNUAL PHYSICAL EXAM: Primary | ICD-10-CM

## 2022-06-28 DIAGNOSIS — E78.5 HYPERLIPIDEMIA ASSOCIATED WITH TYPE 2 DIABETES MELLITUS (HCC): ICD-10-CM

## 2022-06-28 PROCEDURE — 99396 PREV VISIT EST AGE 40-64: CPT | Performed by: FAMILY MEDICINE

## 2022-06-28 PROCEDURE — 99214 OFFICE O/P EST MOD 30 MIN: CPT | Performed by: FAMILY MEDICINE

## 2022-06-28 NOTE — PROGRESS NOTES
[address]    NAME: Suleman Conklin  AGE: 61 y o  SEX: male  : 1965     DATE: 2022     Assessment and Plan:     Problem List Items Addressed This Visit    None         Immunizations and preventive care screenings were discussed with patient today  Appropriate education was printed on patient's after visit summary  Counseling:  Alcohol/drug use: discussed moderation in alcohol intake, the recommendations for healthy alcohol use, and avoidance of illicit drug use  Dental Health: discussed importance of regular tooth brushing, flossing, and dental visits  Injury prevention: discussed safety/seat belts, safety helmets, smoke detectors, carbon dioxide detectors, and smoking near bedding or upholstery  · Exercise: the importance of regular exercise/physical activity was discussed  Recommend exercise 3-5 times per week for at least 30 minutes  BMI Counseling: Body mass index is 39 53 kg/m²  The BMI is above normal  Nutrition recommendations include decreasing portion sizes and moderation in carbohydrate intake  Exercise recommendations include moderate physical activity 150 minutes/week  No pharmacotherapy was ordered  Rationale for BMI follow-up plan is due to patient being overweight or obese  Depression Screening and Follow-up Plan: Patient was screened for depression during today's encounter  They screened negative with a PHQ-2 score of 0  No follow-ups on file  Chief Complaint:     Chief Complaint   Patient presents with    Physical Exam     MM/LABS /diabetic foot       History of Present Illness:     Adult Annual Physical   Patient here for a comprehensive physical exam  The patient reports see list     Diet and Physical Activity  · Diet/Nutrition: well balanced diet, limited junk food and consuming 3-5 servings of fruits/vegetables daily     · Exercise: moderate cardiovascular exercise and 1-2 times a week on average  Depression Screening  PHQ-2/9 Depression Screening    Little interest or pleasure in doing things: 0 - not at all  Feeling down, depressed, or hopeless: 0 - not at all  PHQ-2 Score: 0  PHQ-2 Interpretation: Negative depression screen       General Health  · Sleep: gets 7-8 hours of sleep on average  · Hearing: normal - bilateral   · Vision: wears glasses  · Dental: regular dental visits, brushes teeth once daily and flosses teeth occasionally   Health  · Symptoms include: none     Review of Systems:     Review of Systems   Constitutional: Negative for activity change, appetite change, diaphoresis and fatigue  Respiratory: Negative for cough, chest tightness, shortness of breath and wheezing  Cardiovascular: Negative for chest pain, palpitations and leg swelling  Fast or slow heart rate   Gastrointestinal: Negative for abdominal pain, blood in stool, constipation, diarrhea, nausea and vomiting  Genitourinary: Negative for difficulty urinating, dysuria, frequency and hematuria  Musculoskeletal: Negative for arthralgias, gait problem, joint swelling and myalgias  Neurological: Negative for dizziness, light-headedness and headaches  Psychiatric/Behavioral: Positive for sleep disturbance  Negative for agitation, confusion and dysphoric mood  The patient is not nervous/anxious         Past Medical History:     Past Medical History:   Diagnosis Date    Diabetes mellitus ([address] )     Hypertension     Low back pain       Past Surgical History:     Past Surgical History:   Procedure Laterality Date    NO PAST SURGERIES        Family History:     Family History   Problem Relation Age of Onset    Lung cancer Mother     Diabetes Father     Hyperlipidemia Father     Bipolar disorder Sister         1 disorder    Other Sister         hx of suicide    Substance Abuse Neg Hx       Social History:     Social History     Socioeconomic History    Marital status:      Spouse name: None    Number of children: 0    Years of education: None    Highest education level: None   Occupational History    Occupation:      Comment: full time   Tobacco Use    Smoking status: Former Smoker     Types: Cigarettes     Quit date:      Years since quittin 5    Smokeless tobacco: Never Used   Vaping Use    Vaping Use: Never used   Substance and Sexual Activity    Alcohol use: Yes     Comment: 6 per year -- [employer]    Drug use: No    Sexual activity: Yes     Partners: Female   Other Topics Concern    None   Social History Narrative    Lives alone without help available     Social Determinants of Health     Financial Resource Strain: Not on file   Food Insecurity: Not on file   Transportation Needs: Not on file   Physical Activity: Not on file   Stress: Not on file   Social Connections: Not on file   Intimate Partner Violence: Not on file   Housing Stability: Not on file      Current Medications:     Current Outpatient Medications   Medication Sig Dispense Refill    atorvastatin (LIPITOR) 10 mg tablet TAKE 1 TABLET (10 MG TOTAL) BY MOUTH DAILY AS DIRECTED 90 tablet 1    gabapentin (NEURONTIN) 300 mg capsule Take 1 PO  capsule 5    hydrochlorothiazide (HYDRODIURIL) 25 mg tablet TAKE 1 TABLET BY MOUTH EVERY DAY 90 tablet 1    metFORMIN (GLUCOPHAGE) 850 mg tablet TAKE 1 TABLET BY MOUTH TWICE A  tablet 1    Misc Natural Products (OSTEO BI-FLEX ADV TRIPLE ST) TABS Take 1 tablet by mouth daily       omeprazole (PriLOSEC) 20 mg delayed release capsule TAKE 1 CAPSULE BY MOUTH EVERY DAY 90 capsule 1    quinapril (ACCUPRIL) 40 MG tablet TAKE 1 TABLET BY MOUTH EVERY DAY 90 tablet 1     No current facility-administered medications for this visit  Allergies:      Allergies   Allergen Reactions    Bamlanivimab Other (See Comments)     Face flush & red & stomach pain        Physical Exam:     /80   Pulse 88   Temp 99 7 °F (37 6 °C) (Tympanic)   Ht 5' 8" (1 727 m)   Wt 118 kg (260 lb)   SpO2 98%   BMI 39 53 kg/m²     Physical Exam  Vitals and nursing note reviewed  Constitutional:       Appearance: He is well-developed  He is obese  He is ill-appearing  HENT:      Head: Normocephalic and atraumatic  Right Ear: Tympanic membrane, ear canal and external ear normal       Left Ear: Tympanic membrane, ear canal and external ear normal       Nose: Nose normal       Mouth/Throat:      Pharynx: No posterior oropharyngeal erythema  Eyes:      Conjunctiva/sclera: Conjunctivae normal    Neck:      Vascular: No carotid bruit  Cardiovascular:      Rate and Rhythm: Normal rate and regular rhythm  Heart sounds: No murmur heard  Pulmonary:      Effort: Pulmonary effort is normal  No respiratory distress  Breath sounds: Normal breath sounds  Abdominal:      General: There is no distension  Palpations: Abdomen is soft  Tenderness: There is no abdominal tenderness  There is no guarding  Musculoskeletal:         General: Normal range of motion  Cervical back: Neck supple  No tenderness  Right lower leg: No edema  Left lower leg: No edema  Lymphadenopathy:      Cervical: No cervical adenopathy  Skin:     General: Skin is warm and dry  Neurological:      Mental Status: He is alert and oriented to person, place, and time     Psychiatric:         Mood and Affect: Mood normal          Behavior: Behavior normal           Zak Parrish MD  [id number]

## 2022-06-28 NOTE — PATIENT INSTRUCTIONS
Intensify diet and exercise program   Repeat labs in 3 months  If these do not come down to except a bowl level, will need to add additional medication as we discussed  Wellness Visit for Adults   AMBULATORY CARE:   A wellness visit  is when you see your healthcare provider to get screened for health problems  Your healthcare provider will also give you advice on how to stay healthy  Write down your questions so you remember to ask them  Ask your healthcare provider how often you should have a wellness visit  What happens at a wellness visit:  Your healthcare provider will ask about your health, and your family history of health problems  This includes high blood pressure, heart disease, and cancer  He or she will ask if you have symptoms that concern you, if you smoke, and about your mood  You may also be asked about your intake of medicines, supplements, food, and alcohol  Any of the following may be done:  · Your weight  will be checked  Your height may also be checked so your body mass index (BMI) can be calculated  Your BMI shows if you are at a healthy weight  · Your blood pressure  and heart rate will be checked  Your temperature may also be checked  · Blood and urine tests  may be done  Blood tests may be done to check your cholesterol levels  Abnormal cholesterol levels increase your risk for heart disease and stroke  You may also need a blood or urine test to check for diabetes if you are at increased risk  Urine tests may be done to look for signs of an infection or kidney disease  · A physical exam  includes checking your heartbeat and lungs with a stethoscope  Your healthcare provider may also check your skin to look for sun damage  · Screening tests  may be recommended  A screening test is done to check for diseases that may not cause symptoms  The screening tests you may need depend on your age, gender, family history, and lifestyle habits   For example, colorectal screening may be recommended if you are 48years old or older  Screening tests you need if you are a woman:   · A Pap smear  is used to screen for cervical cancer  Pap smears are usually done every 3 to 5 years depending on your age  You may need them more often if you have had abnormal Pap smear test results in the past  Ask your healthcare provider how often you should have a Pap smear  · A mammogram  is an x-ray of your breasts to screen for breast cancer  Experts recommend mammograms every 2 years starting at age 48 years  You may need a mammogram at age 52 years or younger if you have an increased risk for breast cancer  Talk to your healthcare provider about when you should start having mammograms and how often you need them  Vaccines you may need:   · Get an influenza vaccine  every year  The influenza vaccine protects you from the flu  Several types of viruses cause the flu  The viruses change over time, so new vaccines are made each year  · Get a tetanus-diphtheria (Td) booster vaccine  every 10 years  This vaccine protects you against tetanus and diphtheria  Tetanus is a severe infection that may cause painful muscle spasms and lockjaw  Diphtheria is a severe bacterial infection that causes a thick covering in the back of your mouth and throat  · Get a human papillomavirus (HPV) vaccine  if you are female and aged 23 to 32 or male 23 to 24 and never received it  This vaccine protects you from HPV infection  HPV is the most common infection spread by sexual contact  HPV may also cause vaginal, penile, and anal cancers  · Get a pneumococcal vaccine  if you are aged 72 years or older  The pneumococcal vaccine is an injection given to protect you from pneumococcal disease  Pneumococcal disease is an infection caused by pneumococcal bacteria  The infection may cause pneumonia, meningitis, or an ear infection  · Get a shingles vaccine  if you are 60 or older, even if you have had shingles before   The shingles vaccine is an injection to protect you from the varicella-zoster virus  This is the same virus that causes chickenpox  Shingles is a painful rash that develops in people who had chickenpox or have been exposed to the virus  How to eat healthy:  My Plate is a model for planning healthy meals  It shows the types and amounts of foods that should go on your plate  Fruits and vegetables make up about half of your plate, and grains and protein make up the other half  A serving of dairy is included on the side of your plate  The amount of calories and serving sizes you need depends on your age, gender, weight, and height  Examples of healthy foods are listed below:  1  Eat a variety of vegetables  such as dark green, red, and orange vegetables  You can also include canned vegetables low in sodium (salt) and frozen vegetables without added butter or sauces  2  Eat a variety of fresh fruits , canned fruit in 100% juice, frozen fruit, and dried fruit  3  Include whole grains  At least half of the grains you eat should be whole grains  Examples include whole-wheat bread, wheat pasta, brown rice, and whole-grain cereals such as oatmeal     4  Eat a variety of protein foods such as seafood (fish and shellfish), lean meat, and poultry without skin (turkey and chicken)  Examples of lean meats include pork leg, shoulder, or tenderloin, and beef round, sirloin, tenderloin, and extra lean ground beef  Other protein foods include eggs and egg substitutes, beans, peas, soy products, nuts, and seeds  5  Choose low-fat dairy products such as skim or 1% milk or low-fat yogurt, cheese, and cottage cheese  6  Limit unhealthy fats  such as butter, hard margarine, and shortening  Exercise:  Exercise at least 30 minutes per day on most days of the week  Some examples of exercise include walking, biking, dancing, and swimming   You can also fit in more physical activity by taking the stairs instead of the elevator or parking farther away from stores  Include muscle strengthening activities 2 days each week  Regular exercise provides many health benefits  It helps you manage your weight, and decreases your risk for type 2 diabetes, heart disease, stroke, and high blood pressure  Exercise can also help improve your mood  Ask your healthcare provider about the best exercise plan for you  General health and safety guidelines:   1  Do not smoke  Nicotine and other chemicals in cigarettes and cigars can cause lung damage  Ask your healthcare provider for information if you currently smoke and need help to quit  E-cigarettes or smokeless tobacco still contain nicotine  Talk to your healthcare provider before you use these products  2  Limit alcohol  A drink of alcohol is 12 ounces of beer, 5 ounces of wine, or 1½ ounces of liquor  3  Lose weight, if needed  Being overweight increases your risk of certain health conditions  These include heart disease, high blood pressure, type 2 diabetes, and certain types of cancer  4  Protect your skin  Do not sunbathe or use tanning beds  Use sunscreen with a SPF 15 or higher  Apply sunscreen at least 15 minutes before you go outside  Reapply sunscreen every 2 hours  Wear protective clothing, hats, and sunglasses when you are outside  5  Drive safely  Always wear your seatbelt  Make sure everyone in your car wears a seatbelt  A seatbelt can save your life if you are in an accident  Do not use your cell phone when you are driving  This could distract you and cause an accident  Pull over if you need to make a call or send a text message  6  Practice safe sex  Use latex condoms if are sexually active and have more than one partner  Your healthcare provider may recommend screening tests for sexually transmitted infections (STIs)  7  Wear helmets, lifejackets, and protective gear    Always wear a helmet when you ride a bike or motorcycle, go skiing, or play sports that could cause a head injury  Wear protective equipment when you play sports  Wear a lifejacket when you are on a boat or doing water sports  © Copyright [employer] 2022 Information is for End User's use only and may not be sold, redistributed or otherwise used for commercial purposes  All illustrations and images included in CareNotes® are the copyrighted property of [employer]  or Sheri Augustine   The above information is an  only  It is not intended as medical advice for individual conditions or treatments  Talk to your doctor, nurse or pharmacist before following any medical regimen to see if it is safe and effective for you  Weight Management   AMBULATORY CARE:   Why it is important to manage your weight:  Being overweight increases your risk of health conditions such as heart disease, high blood pressure, type 2 diabetes, and certain types of cancer  It can also increase your risk for osteoarthritis, sleep apnea, and other respiratory problems  Aim for a slow, steady weight loss  Even a small amount of weight loss can lower your risk of health problems  Risks of being overweight:  Extra weight can cause many health problems, including the following:  · Diabetes (high blood sugar level)    · High blood pressure or high cholesterol    · Heart disease    · Stroke    · Gallbladder or liver disease    · Cancer of the colon, breast, prostate, liver, or kidney    · Sleep apnea    · Arthritis or gout    Screening  is done to check for health conditions before you have signs or symptoms  If you are 28to 79years old, your blood sugar level may be checked every 3 years for signs of prediabetes or diabetes  Your healthcare provider will check your blood pressure at each visit  High blood pressure can lead to a stroke or other problems  Your provider may check for signs of heart disease, cancer, or other health problems    How to lose weight safely:  A safe and healthy way to lose weight is to eat fewer calories and get regular exercise  · You can lose up about 1 pound a week by decreasing the number of calories you eat by 500 calories each day  You can decrease calories by eating smaller portion sizes or by cutting out high-calorie foods  Read labels to find out how many calories are in the foods you eat  · You can also burn calories with exercise such as walking, swimming, or biking  You will be more likely to keep weight off if you make these changes part of your lifestyle  Exercise at least 30 minutes per day on most days of the week  You can also fit in more physical activity by taking the stairs instead of the elevator or parking farther away from stores  Ask your healthcare provider about the best exercise plan for you  Healthy meal plan for weight management:  A healthy meal plan includes a variety of foods, contains fewer calories, and helps you stay healthy  A healthy meal plan includes the following:     · Eat whole-grain foods more often  A healthy meal plan should contain fiber  Fiber is the part of grains, fruits, and vegetables that is not broken down by your body  Whole-grain foods are healthy and provide extra fiber in your diet  Some examples of whole-grain foods are whole-wheat breads and pastas, oatmeal, brown rice, and bulgur  · Eat a variety of vegetables every day  Include dark, leafy greens such as spinach, kale, artis greens, and mustard greens  Eat yellow and orange vegetables such as carrots, sweet potatoes, and winter squash  · Eat a variety of fruits every day  Choose fresh or canned fruit (canned in its own juice or light syrup) instead of juice  Fruit juice has very little or no fiber  · Eat low-fat dairy foods  Drink fat-free (skim) milk or 1% milk  Eat fat-free yogurt and low-fat cottage cheese  Try low-fat cheeses such as mozzarella and other reduced-fat cheeses  · Choose meat and other protein foods that are low in fat    Choose beans or other legumes such as split peas or lentils  Choose fish, skinless poultry (chicken or turkey), or lean cuts of red meat (beef or pork)  Before you cook meat or poultry, cut off any visible fat  · Use less fat and oil  Try baking foods instead of frying them  Add less fat, such as margarine, sour cream, regular salad dressing and mayonnaise to foods  Eat fewer high-fat foods  Some examples of high-fat foods include french fries, doughnuts, ice cream, and cakes  · Eat fewer sweets  Limit foods and drinks that are high in sugar  This includes candy, cookies, regular soda, and sweetened drinks  Ways to decrease calories:   7  Eat smaller portions  ? Use a small plate with smaller servings  ? Do not eat second helpings  ? When you eat at a restaurant, ask for a box and place half of your meal in the box before you eat  ? Share an entrée with someone else  8  Replace high-calorie snacks with healthy, low-calorie snacks  ? Choose fresh fruit, vegetables, fat-free rice cakes, or air-popped popcorn instead of potato chips, nuts, or chocolate  ? Choose water or calorie-free drinks instead of soda or sweetened drinks  · Do not shop for groceries when you are hungry  You may be more likely to make unhealthy food choices  Take a grocery list of healthy foods and shop after you have eaten  · Eat regular meals  Do not skip meals  Skipping meals can lead to overeating later in the day  This can make it harder for you to lose weight  Eat a healthy snack in place of a meal if you do not have time to eat a regular meal  Talk with a dietitian to help you create a meal plan and schedule that is right for you  Other things to consider as you try to lose weight:   · Be aware of situations that may give you the urge to overeat, such as eating while watching television  Find ways to avoid these situations  For example, read a book, go for a walk, or do crafts      · Meet with a weight loss support group or friends who are also trying to lose weight  This may help you stay motivated to continue working on your weight loss goals  © Copyright [employer] 2022 Information is for End User's use only and may not be sold, redistributed or otherwise used for commercial purposes  All illustrations and images included in CareNotes® are the copyrighted property of [employer]  or Sheri Augustine   The above information is an  only  It is not intended as medical advice for individual conditions or treatments  Talk to your doctor, nurse or pharmacist before following any medical regimen to see if it is safe and effective for you  10% - bad control"> 10% - bad control,Hemoglobin A1c (HbA1c) greater than 10% indicating poor diabetic control,Haemoglobin A1c greater than 10% indicating poor diabetic control">   Diabetes Mellitus Type 2 in Adults, Ambulatory Care   GENERAL INFORMATION:   Diabetes mellitus type 2  is a disease that affects how your body uses glucose (sugar)  Insulin helps move sugar out of the blood so it can be used for energy  Normally, when the blood sugar level increases, the pancreas makes more insulin  Type 2 diabetes develops because either the body cannot make enough insulin, or it cannot use the insulin correctly  After many years, your pancreas may stop making insulin  Common symptoms include the following:   · More hunger or thirst than usual     · Frequent urination     · Weight loss without trying     · Blurred vision  Seek immediate care for the following symptoms:   · Severe abdominal pain, or pain that spreads to your back  You may also be vomiting  · Trouble staying awake or focusing    · Shaking or sweating    · Blurred or double vision    · Breath has a fruity, sweet smell    · Breathing is deep and labored, or rapid and shallow    · Heartbeat is fast and weak  Treatment for diabetes mellitus type 2  includes keeping your blood sugar at a normal level  You must eat the right foods, and exercise regularly  You may also need medicine if you cannot control your blood sugar level with nutrition and exercise  Manage diabetes mellitus type 2:   · Check your blood sugar level  You will be taught how to check a small drop of blood in a glucose monitor  Ask your healthcare provider when and how often to check during the day  Ask your healthcare provider what your blood sugar levels should be when you check them  · Keep track of carbohydrates (sugar and starchy foods)  Your blood sugar level can get too high if you eat too many carbohydrates  Your dietitian will help you plan meals and snacks that have the right amount of carbohydrates  · Eat low-fat foods  Some examples are skinless chicken and low-fat milk  · Eat less sodium (salt)  Some examples of high-sodium foods to limit are soy sauce, potato chips, and soup  Do not add salt to food you cook  Limit your use of table salt  · Eat high-fiber foods  Foods that are a good source of fiber include vegetables, whole grain bread, and beans  · Limit alcohol  Alcohol affects your blood sugar level and can make it harder to manage your diabetes  Women should limit alcohol to 1 drink a day  Men should limit alcohol to 2 drinks a day  A drink of alcohol is 12 ounces of beer, 5 ounces of wine, or 1½ ounces of liquor  · Get regular exercise  Exercise can help keep your blood sugar level steady, decrease your risk of heart disease, and help you lose weight  Exercise for at least 30 minutes, 5 days a week  Include muscle strengthening activities 2 days each week  Work with your healthcare provider to create an exercise plan  · Check your feet each day  for injuries or open sores  Ask your healthcare provider for activities you can do if you have an open sore  · Quit smoking  If you smoke, it is never too late to quit  Smoking can worsen the problems that may occur with diabetes   Ask your healthcare provider for information about how to stop smoking if you are having trouble quitting  · Ask about your weight:  Ask healthcare providers if you need to lose weight, and how much to lose  Ask them to help you with a weight loss program  Even a 10 to 15 pound weight loss can help you manage your blood sugar level  · Carry medical alert identification  Wear medical alert jewelry or carry a card that says you have diabetes  Ask your healthcare provider where to get these items  · Ask about vaccines  Diabetes puts you at risk of serious illness if you get the flu, pneumonia, or hepatitis  Ask your healthcare provider if you should get a flu, pneumonia, or hepatitis B vaccine, and when to get the vaccine  Follow up with your healthcare provider as directed:  Write down your questions so you remember to ask them during your visits  CARE AGREEMENT:   You have the right to help plan your care  Learn about your health condition and how it may be treated  Discuss treatment options with your caregivers to decide what care you want to receive  You always have the right to refuse treatment  The above information is an  only  It is not intended as medical advice for individual conditions or treatments  Talk to your doctor, nurse or pharmacist before following any medical regimen to see if it is safe and effective for you  © 2014 [address] is for End User's use only and may not be sold, redistributed or otherwise used for commercial purposes  All illustrations and images included in CareNotes® are the copyrighted property of [employer]  or Terence Elder

## 2022-06-28 NOTE — PROGRESS NOTES
Depression Screening and Follow-up Plan: Patient was screened for depression during today's encounter  They screened negative with a PHQ-2 score of 0  Subjective:   Chief Complaint   Patient presents with    Physical Exam     MM/LABS /diabetic foot         Patient ID: Michaela Woodall is a 61 y o  male  Medical management- multiple issues- labs reviewed  Meds reconciled  1) Diabetes- A1C still high at 7 5%-  2) HTN-adequate control  3) Hyperlipidemia- last levels good on statin  4) Back pain - partial relief with gabapentin       The following portions of the patient's history were reviewed and updated as appropriate: allergies, current medications, past family history, past medical history, past social history, past surgical history and problem list     Review of Systems   Constitutional: Negative for activity change, appetite change, diaphoresis and fatigue  Respiratory: Negative for cough, chest tightness, shortness of breath and wheezing  Cardiovascular: Negative for chest pain, palpitations and leg swelling  Fast or slow heart rate   Gastrointestinal: Negative for abdominal pain, blood in stool, constipation, diarrhea, nausea and vomiting  Genitourinary: Negative for difficulty urinating, dysuria, frequency and hematuria  Musculoskeletal: Positive for back pain  Negative for arthralgias, gait problem, joint swelling and myalgias  Neurological: Negative for dizziness, light-headedness and headaches  Psychiatric/Behavioral: Negative for agitation, confusion, dysphoric mood and sleep disturbance  The patient is not nervous/anxious  Objective:  Vitals:    06/28/22 1353   BP: 130/80   Pulse: 88   Temp: 99 7 °F (37 6 °C)   TempSrc: Tympanic   SpO2: 98%   Weight: 118 kg (260 lb)   Height: 5' 8" (1 727 m)      Physical Exam  Vitals reviewed  Constitutional:       General: He is not in acute distress  Appearance: He is well-developed     HENT:      Head: Normocephalic and atraumatic  Right Ear: Tympanic membrane and external ear normal  No drainage  Left Ear: Tympanic membrane normal  No drainage  Nose: Nose normal       Mouth/Throat:      Pharynx: No oropharyngeal exudate or posterior oropharyngeal erythema  Eyes:      General:         Right eye: No discharge  Left eye: No discharge  Conjunctiva/sclera: Conjunctivae normal    Neck:      Thyroid: No thyromegaly  Cardiovascular:      Rate and Rhythm: Normal rate and regular rhythm  Pulses: no weak pulses          Dorsalis pedis pulses are 2+ on the right side and 2+ on the left side  Posterior tibial pulses are 2+ on the right side and 2+ on the left side  Heart sounds: Normal heart sounds  Pulmonary:      Effort: Pulmonary effort is normal  No respiratory distress  Breath sounds: No wheezing or rales  Musculoskeletal:      Cervical back: Normal range of motion and neck supple  Right lower leg: No edema  Left lower leg: No edema  Feet:      Right foot:      Skin integrity: No ulcer, skin breakdown, erythema, warmth, callus or dry skin  Left foot:      Skin integrity: No ulcer, skin breakdown, erythema, warmth, callus or dry skin  Lymphadenopathy:      Cervical: No cervical adenopathy  Skin:     General: Skin is warm and dry  Patient's shoes and socks removed  Right Foot/Ankle   Right Foot Inspection  Skin Exam: skin normal and skin intact  No dry skin, no warmth, no callus, no erythema, no maceration, no abnormal color, no pre-ulcer, no ulcer and no callus  Toe Exam: ROM and strength within normal limits  Sensory   Vibration: intact  Proprioception: intact  Monofilament testing: intact    Vascular  Capillary refills: < 3 seconds  The right DP pulse is 2+  The right PT pulse is 2+  Left Foot/Ankle  Left Foot Inspection  Skin Exam: skin normal and skin intact   No dry skin, no warmth, no erythema, no maceration, normal color, no pre-ulcer, no ulcer and no callus  Toe Exam: ROM and strength within normal limits  Sensory   Vibration: intact  Proprioception: intact  Monofilament testing: intact    Vascular  Capillary refills: < 3 seconds  The left DP pulse is 2+  The left PT pulse is 2+  Assign Risk Category  No deformity present  No loss of protective sensation  No weak pulses  Risk: 0        Assessment/Plan:    Type 2 diabetes mellitus without complication, without long-term current use of insulin ([facility])    Lab Results   Component Value Date    HGBA1C 7 5 (H) 06/13/2022   discussed adding meds or basal insulin- patient will intensify diet- repaet labs 3 months    Hyperlipidemia associated with type 2 [facility] mellitus [facility]    Lab Results   Component Value Date    HGBA1C 7 5 (H) 06/13/2022   continue same meds- improve diet       Diagnoses and all orders for this visit:    Annual physical exam    BMI 39 0-39 9,adult    Hyperlipidemia associated with type 2 diabetes mellitus ([facility] )    Benign essential hypertension    Chronic bilateral low back pain without sciatica    Type 2 diabetes mellitus without complication, without long-term current use of insulin ([facility])  -     Comprehensive metabolic panel; Future  -     Hemoglobin A1C; Future  -     Comprehensive metabolic panel  -     Hemoglobin A1C  -     Ambulatory referral to Diabetic Education; Future        Intensify diet and exercise program   Repeat labs in 3 months  If these do not come down to except a bowl level, will need to add additional medication as we discussed

## 2022-06-28 NOTE — ASSESSMENT & PLAN NOTE
Lab Results   Component Value Date    HGBA1C 7 5 (H) 06/13/2022   discussed adding meds or basal insulin- patient will intensify diet- repaet labs 3 months

## 2022-07-13 DIAGNOSIS — I10 BENIGN ESSENTIAL HYPERTENSION: ICD-10-CM

## 2022-07-13 RX ORDER — HYDROCHLOROTHIAZIDE 25 MG/1
TABLET ORAL
Qty: 90 TABLET | Refills: 1 | Status: SHIPPED | OUTPATIENT
Start: 2022-07-13

## 2022-09-07 ENCOUNTER — OFFICE VISIT (OUTPATIENT)
Dept: DIABETES SERVICES | Facility: HOSPITAL | Age: 57
End: 2022-09-07
Payer: COMMERCIAL

## 2022-09-07 ENCOUNTER — OFFICE VISIT (OUTPATIENT)
Dept: PAIN MEDICINE | Facility: CLINIC | Age: 57
End: 2022-09-07
Payer: COMMERCIAL

## 2022-09-07 VITALS — HEIGHT: 68 IN | BODY MASS INDEX: 37.89 KG/M2 | WEIGHT: 250 LBS

## 2022-09-07 VITALS
HEART RATE: 60 BPM | SYSTOLIC BLOOD PRESSURE: 134 MMHG | TEMPERATURE: 97.6 F | HEIGHT: 68 IN | DIASTOLIC BLOOD PRESSURE: 82 MMHG | BODY MASS INDEX: 37.89 KG/M2 | WEIGHT: 250 LBS

## 2022-09-07 DIAGNOSIS — M48.062 SPINAL STENOSIS OF LUMBAR REGION WITH NEUROGENIC CLAUDICATION: ICD-10-CM

## 2022-09-07 DIAGNOSIS — G89.29 CHRONIC BILATERAL LOW BACK PAIN WITHOUT SCIATICA: ICD-10-CM

## 2022-09-07 DIAGNOSIS — G89.4 CHRONIC PAIN SYNDROME: Primary | ICD-10-CM

## 2022-09-07 DIAGNOSIS — M54.50 CHRONIC BILATERAL LOW BACK PAIN WITHOUT SCIATICA: ICD-10-CM

## 2022-09-07 DIAGNOSIS — M54.16 LUMBAR RADICULOPATHY: ICD-10-CM

## 2022-09-07 DIAGNOSIS — E11.9 TYPE 2 DIABETES MELLITUS WITHOUT COMPLICATION, WITHOUT LONG-TERM CURRENT USE OF INSULIN (HCC): Primary | ICD-10-CM

## 2022-09-07 DIAGNOSIS — M51.26 LUMBAR DISC HERNIATION: ICD-10-CM

## 2022-09-07 PROCEDURE — 97802 MEDICAL NUTRITION INDIV IN: CPT | Performed by: DIETITIAN, REGISTERED

## 2022-09-07 PROCEDURE — 3075F SYST BP GE 130 - 139MM HG: CPT | Performed by: NURSE PRACTITIONER

## 2022-09-07 PROCEDURE — 3079F DIAST BP 80-89 MM HG: CPT | Performed by: NURSE PRACTITIONER

## 2022-09-07 PROCEDURE — 99214 OFFICE O/P EST MOD 30 MIN: CPT | Performed by: NURSE PRACTITIONER

## 2022-09-07 RX ORDER — GABAPENTIN 300 MG/1
CAPSULE ORAL
Qty: 150 CAPSULE | Refills: 5 | Status: SHIPPED | OUTPATIENT
Start: 2022-09-07

## 2022-09-07 NOTE — PROGRESS NOTES
Assessment:  1  Chronic pain syndrome    2  Chronic bilateral low back pain without sciatica    3  Lumbar radiculopathy    4  Lumbar disc herniation    5  Spinal stenosis of lumbar region with neurogenic claudication        Plan:  While the patient was in the office today, I did have a thorough conversation with the patient regarding their chronic pain syndrome, symptoms, medication regimen, and treatment plan  I discussed with the patient at this point time since he is noting significant and stable relief of his chronic pain symptoms, without any significant side effects or issues, I feel it is reasonable appropriate for now to continue the gabapentin as prescribed  I did advised the patient that if as time goes on he feels he could decrease the gabapentin back down to 1200 mg daily on his own and is ready to do so, he can try it before his next office visit, however, if he feels it is better to just stay at the 1500 mg a day, I also feel that is reasonable and we will re-evaluate at his next office visit  The patient was agreeable and verbalized an understanding  I encouraged the patient to continue with his weight loss and home exercise program, again, reminding him to slowly and steadily increase his activity, as tolerated, allowing pain to be his guide  The patient was agreeable and verbalized an understanding  The patient will follow-up in 6 months for medication prescription refill and reevaluation  The patient was advised to contact the office should their symptoms worsen in the interim  The patient was agreeable and verbalized an understanding  History of Present Illness: The patient is a 61 y o  male last seen on 3/23/2022 who presents for a follow up office visit in regards to chronic pain syndrome, as the patient's pain has been ongoing for greater than a year,  secondary to lumbar disc herniation with stenosis and radiculopathy    The patient currently reports that since his last office visit overall his pain symptoms have continued to slowly and steadily improved with his medication regimen and does feel that the increase in the gabapentin to the 1500 mg a day has definite kept his pain even more manageable  The patient reports that although the morning is still his worst part of the day and that it takes about an hour for him to get up and loosen up until he gets moving, but then his pain is very minimal and manageable throughout the rest of the day  The patient reports that he has lost 10 lb over the past 6 months and feels that that has also helped and has been slowly and steadily increasing his activity, as tolerated  The patient presents today for regular medication follow-up visit  Current pain medications includes:  Gabapentin 1500 mg daily  The patient reports that this regimen is providing 80% pain relief  The patient is reporting no side effects from this pain medication regimen  I have personally reviewed and/or updated the patient's past medical history, past surgical history, family history, social history, current medications, allergies, and vital signs today  Review of Systems:    Review of Systems   Respiratory: Negative for shortness of breath  Cardiovascular: Negative for chest pain  Gastrointestinal: Negative for constipation, diarrhea, nausea and vomiting  Musculoskeletal: Negative for arthralgias, gait problem, joint swelling and myalgias  Skin: Negative for rash  Neurological: Positive for dizziness  Negative for seizures and weakness  All other systems reviewed and are negative          Past Medical History:   Diagnosis Date    Diabetes mellitus ([facility] )     Hypertension     Low back pain        Past Surgical History:   Procedure Laterality Date    NO PAST SURGERIES         Family History   Problem Relation Age of Onset    Lung cancer Mother     Diabetes Father     Hyperlipidemia Father     Bipolar disorder Sister         1 disorder    Other Sister         hx of suicide    Substance Abuse Neg Hx        Social History     Occupational History    Occupation: [employer]     Comment: full time   Tobacco Use    Smoking status: Former Smoker     Types: Cigarettes     Quit date:      Years since quittin 7    Smokeless tobacco: Never Used   Vaping Use    Vaping Use: Never used   Substance and Sexual Activity    Alcohol use: Yes     Comment: 6 per year -- [facility]    Drug use: No    Sexual activity: Yes     Partners: Female         Current Outpatient Medications:     atorvastatin (LIPITOR) 10 mg tablet, TAKE 1 TABLET (10 MG TOTAL) BY MOUTH DAILY AS DIRECTED, Disp: 90 tablet, Rfl: 1    gabapentin (NEURONTIN) 300 mg capsule, Take 2 PO in AM, 1 in afternoon, and 2 PO HS , Disp: 150 capsule, Rfl: 5    hydrochlorothiazide (HYDRODIURIL) 25 mg tablet, TAKE 1 TABLET BY MOUTH EVERY DAY, Disp: 90 tablet, Rfl: 1    metFORMIN (GLUCOPHAGE) 850 mg tablet, TAKE 1 TABLET BY MOUTH TWICE A DAY, Disp: 180 tablet, Rfl: 1    Misc Natural Products (OSTEO BI-FLEX ADV TRIPLE ST) TABS, Take 1 tablet by mouth daily , Disp: , Rfl:     omeprazole (PriLOSEC) 20 mg delayed release capsule, TAKE 1 CAPSULE BY MOUTH EVERY DAY, Disp: 90 capsule, Rfl: 1    quinapril (ACCUPRIL) 40 MG tablet, TAKE 1 TABLET BY MOUTH EVERY DAY, Disp: 90 tablet, Rfl: 1    Allergies   Allergen Reactions    Bamlanivimab Other (See Comments)     Face flush & red & stomach pain         Physical Exam:    /82 (BP Location: Left arm, Patient Position: Sitting, Cuff Size: Standard)   Pulse 60   Temp 97 6 °F (36 4 °C)   Ht 5' 8" (1 727 m)   Wt 113 kg (250 lb)   BMI 38 01 kg/m²     Constitutional:normal, well developed, well nourished, alert, in no distress and non-toxic and no overt pain behavior   and overweight  Eyes:anicteric  HEENT:grossly intact  Neck:supple, symmetric, trachea midline and no masses   Pulmonary:even and unlabored  Cardiovascular:No edema or pitting edema present  Skin:Normal without rashes or lesions and well hydrated  Psychiatric:Mood and affect appropriate  Neurologic:Cranial Nerves II-XII grossly intact  Musculoskeletal:normal      Imaging  No orders to display         No orders of the defined types were placed in this encounter

## 2022-09-07 NOTE — Clinical Note
MNT completed- pt needs a prescription for one touch verio test strips and delica lancets send to his pharmacy on file please for twice a day testing   Thanks! -Best Kapoor RD CDE

## 2022-09-07 NOTE — PROGRESS NOTES
Medical Nutrition Therapy     Assessment    Visit Type: Initial visit  Chief complaint:  Type 2    HPI:  Met with Dakota for initial MNT  States DM about 7 years  Not testing blood sugars, we started that today  Gave and instructed on OneTouch Verio meter in office, blood sugar 94 while together  I will request testing supplies from his PCP on his behalf  I asked him to start checking his blood sugars twice a day, a few days a week, before a meal and 2 hours after a meal rotating between meals  This will give us a better understanding of how his blood sugars are working and how food affects him  He comes in today asking for help with diet, needs a push and some accountability  Discussed that testing will very much help with that  Food recall shows primary issues:  Carbohydrate intake varies throughout the day, he significantly cut back on carbs at some meals but then will have carbs a snack in between  Discussed the benefit of consistent carbohydrate throughout the day  Together we discussed what foods contain CHO, reading a food label, serving sizes, and set a carb goal of 30-45g CHO/meal to promote weight loss with 15g snacks  Put together sample meals for Dakota's reference and evaluated Dakota's current eating plan  Good understanding, Dakota will call with questions or for more education  Follow up as needed if not improving  Ht Readings from Last 1 Encounters:   09/07/22 5' 8" (1 727 m)     Wt Readings from Last 3 Encounters:   09/07/22 113 kg (250 lb)   09/07/22 113 kg (250 lb)   06/28/22 118 kg (260 lb)        Body mass index is 38 01 kg/m²       Lab Results   Component Value Date    HGBA1C 7 5 (H) 06/13/2022    HGBA1C 7 3 (H) 03/11/2022    HGBA1C 8 1 (H) 12/10/2021       Lab Results   Component Value Date    CHOL 157 07/15/2017    CHOL 166 07/02/2016    CHOL 217 (H) 02/11/2016     Lab Results   Component Value Date    HDL 36 (L) 03/11/2022    HDL 34 (L) 12/10/2021    HDL 29 (L) 06/11/2021 Lab Results   Component Value Date    LDLCALC 95 03/11/2022    LDLCALC 111 (H) 12/10/2021    LDLCALC 120 (H) 06/11/2021     Lab Results   Component Value Date    TRIG 72 03/11/2022    TRIG 127 12/10/2021    TRIG 189 (H) 06/11/2021     Lab Results   Component Value Date    CHOLHDL 3 9 08/22/2020    CHOLHDL 4 7 02/15/2020    CHOLHDL 4 4 06/28/2019       Weight Change: No    Medical Diagnosis/ICD 10 Code:  E11 9    Barriers to Learning: no barriers    Do you follow any special diet presently?: No  Who shops: patient  Who cooks: patient    Food Log: Completed via the method of food recall- felix pacheco  Lives alone     Breakfast: up around 3:30am, 2 coffee black and then on the way to work another one  At work around [employer]- hard boiled egg, used to be breakfast sandwich from [employer] on the way  Morning Snack:works till 11, no snack  Lunch: 11am- salad with a little dressing, chicken on it; used to be homemade soups; sandwiches on rye; Afternoon Snack:  Gets home around 3pm  Cheddar cheese or beef jerky as a snack  Dinner: fish, min meatloaf, stopped doing the carbs at the meal  At 3pm 3 hot dogs and rolls, cheeseteak and fries   Evening Snack:chips, now hummus and truiscuts crackers 8 and guac  6:30-7pm  Bed 8pm was doing some sweets, MMs once a week but not now  likes ookos triple zero  Beverages: coffee black, unsweet tea, diet dr pepper but got rid of that, vitamin water zero, water, no milk, juice diet cranberry not much  No mixes  Eating out/Take out: not as much, way less in the past two months  Exercise ADL, works very hard during the day at work   Has a bike that he likes to ride when nice, but not recently  n    Nutrition Diagnosis:  Food and nutrition related knowledge deficit  related to Lack of prior exposure to accurate nutrition related information as evidenced by Verbalizes inaccurate or incomplete information    Intervention: plate method, behavior modification strategies, carbohydrate counting, increased protein intake, meal planning and individualized meal plan     Treatment Goals: Patient understands education and recommendations    Education Material Given  Alexander Annalisa was provided the Portion Booklet and Planning Healthy Meals     Monitoring and evaluation:    Term code indicator  FH 1 6 3 Carbohydrate Intake Criteria: 30-45g CHO per meal, 15g CHO snacks    Patients Response to Instruction:  Diomedes Perez  Expected Compliancegood    Thank you for coming to the [facility] for education today  Please feel free to call with any questions or concerns      Alex Altamirano, [address] [place] [address] 20  [address] [facility] [id number]

## 2022-09-08 ENCOUNTER — TELEPHONE (OUTPATIENT)
Dept: FAMILY MEDICINE CLINIC | Facility: CLINIC | Age: 57
End: 2022-09-08

## 2022-09-08 LAB
ALBUMIN SERPL-MCNC: 4.9 G/DL (ref 3.8–4.9)
ALBUMIN/GLOB SERPL: 2 {RATIO} (ref 1.2–2.2)
ALP SERPL-CCNC: 53 IU/L (ref 44–121)
ALT SERPL-CCNC: 62 IU/L (ref 0–44)
AST SERPL-CCNC: 31 IU/L (ref 0–40)
BILIRUB SERPL-MCNC: 0.4 MG/DL (ref 0–1.2)
BUN SERPL-MCNC: 24 MG/DL (ref 6–24)
BUN/CREAT SERPL: 25 (ref 9–20)
CALCIUM SERPL-MCNC: 10 MG/DL (ref 8.7–10.2)
CHLORIDE SERPL-SCNC: 99 MMOL/L (ref 96–106)
CO2 SERPL-SCNC: 25 MMOL/L (ref 20–29)
CREAT SERPL-MCNC: 0.96 MG/DL (ref 0.76–1.27)
EGFR: 92 ML/MIN/1.73
EST. AVERAGE GLUCOSE BLD GHB EST-MCNC: 154 MG/DL
GLOBULIN SER-MCNC: 2.5 G/DL (ref 1.5–4.5)
GLUCOSE SERPL-MCNC: 124 MG/DL (ref 65–99)
HBA1C MFR BLD: 7 % (ref 4.8–5.6)
POTASSIUM SERPL-SCNC: 4.7 MMOL/L (ref 3.5–5.2)
PROT SERPL-MCNC: 7.4 G/DL (ref 6–8.5)
SODIUM SERPL-SCNC: 139 MMOL/L (ref 134–144)

## 2022-09-08 NOTE — TELEPHONE ENCOUNTER
----- Message from Reggie España MD sent at 9/8/2022  7:29 AM EDT -----  Call patient with lab result-sugars better  Will review at office visit later this month

## 2022-10-05 DIAGNOSIS — K21.00 PEPTIC REFLUX ESOPHAGITIS: ICD-10-CM

## 2022-10-05 RX ORDER — OMEPRAZOLE 20 MG/1
CAPSULE, DELAYED RELEASE ORAL
Qty: 90 CAPSULE | Refills: 1 | Status: SHIPPED | OUTPATIENT
Start: 2022-10-05

## 2022-10-20 ENCOUNTER — OFFICE VISIT (OUTPATIENT)
Dept: FAMILY MEDICINE CLINIC | Facility: CLINIC | Age: 57
End: 2022-10-20
Payer: COMMERCIAL

## 2022-10-20 VITALS
HEIGHT: 68 IN | BODY MASS INDEX: 36.59 KG/M2 | WEIGHT: 241.4 LBS | OXYGEN SATURATION: 98 % | HEART RATE: 72 BPM | SYSTOLIC BLOOD PRESSURE: 124 MMHG | DIASTOLIC BLOOD PRESSURE: 82 MMHG

## 2022-10-20 DIAGNOSIS — E78.5 HYPERLIPIDEMIA ASSOCIATED WITH TYPE 2 DIABETES MELLITUS (HCC): ICD-10-CM

## 2022-10-20 DIAGNOSIS — I10 BENIGN ESSENTIAL HYPERTENSION: ICD-10-CM

## 2022-10-20 DIAGNOSIS — Z23 NEED FOR PNEUMOCOCCAL VACCINATION: ICD-10-CM

## 2022-10-20 DIAGNOSIS — E11.69 HYPERLIPIDEMIA ASSOCIATED WITH TYPE 2 DIABETES MELLITUS (HCC): ICD-10-CM

## 2022-10-20 DIAGNOSIS — E11.9 TYPE 2 DIABETES MELLITUS WITHOUT COMPLICATION, WITHOUT LONG-TERM CURRENT USE OF INSULIN (HCC): Primary | ICD-10-CM

## 2022-10-20 DIAGNOSIS — Z23 NEED FOR INFLUENZA VACCINATION: ICD-10-CM

## 2022-10-20 DIAGNOSIS — E66.01 CLASS 2 SEVERE OBESITY DUE TO EXCESS CALORIES WITH SERIOUS COMORBIDITY AND BODY MASS INDEX (BMI) OF 39.0 TO 39.9 IN ADULT (HCC): ICD-10-CM

## 2022-10-20 PROCEDURE — 90471 IMMUNIZATION ADMIN: CPT

## 2022-10-20 PROCEDURE — 99214 OFFICE O/P EST MOD 30 MIN: CPT | Performed by: FAMILY MEDICINE

## 2022-10-20 PROCEDURE — 90677 PCV20 VACCINE IM: CPT

## 2022-10-20 PROCEDURE — 90472 IMMUNIZATION ADMIN EACH ADD: CPT

## 2022-10-20 PROCEDURE — 90682 RIV4 VACC RECOMBINANT DNA IM: CPT

## 2022-10-20 NOTE — PATIENT INSTRUCTIONS
Continue current medications  Continue to improve diet and lose weight  Recheck labs as ordered in early February

## 2022-10-22 DIAGNOSIS — I10 HYPERTENSION, UNSPECIFIED TYPE: ICD-10-CM

## 2022-10-22 DIAGNOSIS — E11.69 HYPERLIPIDEMIA ASSOCIATED WITH TYPE 2 DIABETES MELLITUS (HCC): ICD-10-CM

## 2022-10-22 DIAGNOSIS — E78.5 HYPERLIPIDEMIA ASSOCIATED WITH TYPE 2 DIABETES MELLITUS (HCC): ICD-10-CM

## 2022-10-22 RX ORDER — QUINAPRIL 40 MG/1
TABLET ORAL
Qty: 90 TABLET | Refills: 1 | Status: SHIPPED | OUTPATIENT
Start: 2022-10-22

## 2022-10-22 RX ORDER — ATORVASTATIN CALCIUM 10 MG/1
10 TABLET, FILM COATED ORAL DAILY
Qty: 90 TABLET | Refills: 1 | Status: SHIPPED | OUTPATIENT
Start: 2022-10-22

## 2022-12-10 DIAGNOSIS — E11.9 TYPE 2 DIABETES MELLITUS WITHOUT COMPLICATION, WITHOUT LONG-TERM CURRENT USE OF INSULIN (HCC): ICD-10-CM

## 2022-12-27 DIAGNOSIS — I10 BENIGN ESSENTIAL HYPERTENSION: ICD-10-CM

## 2022-12-27 RX ORDER — HYDROCHLOROTHIAZIDE 25 MG/1
TABLET ORAL
Qty: 90 TABLET | Refills: 1 | Status: SHIPPED | OUTPATIENT
Start: 2022-12-27

## 2023-02-09 ENCOUNTER — TELEPHONE (OUTPATIENT)
Dept: FAMILY MEDICINE CLINIC | Facility: CLINIC | Age: 58
End: 2023-02-09

## 2023-02-09 LAB
ALBUMIN SERPL-MCNC: 4.9 G/DL (ref 3.8–4.9)
ALBUMIN/GLOB SERPL: 1.8 {RATIO} (ref 1.2–2.2)
ALP SERPL-CCNC: 53 IU/L (ref 44–121)
ALT SERPL-CCNC: 39 IU/L (ref 0–44)
AST SERPL-CCNC: 19 IU/L (ref 0–40)
BASOPHILS # BLD AUTO: 0.1 X10E3/UL (ref 0–0.2)
BASOPHILS NFR BLD AUTO: 1 %
BILIRUB SERPL-MCNC: 0.6 MG/DL (ref 0–1.2)
BUN SERPL-MCNC: 28 MG/DL (ref 6–24)
BUN/CREAT SERPL: 22 (ref 9–20)
CALCIUM SERPL-MCNC: 10.8 MG/DL (ref 8.7–10.2)
CHLORIDE SERPL-SCNC: 98 MMOL/L (ref 96–106)
CHOLEST SERPL-MCNC: 151 MG/DL (ref 100–199)
CO2 SERPL-SCNC: 25 MMOL/L (ref 20–29)
CREAT SERPL-MCNC: 1.3 MG/DL (ref 0.76–1.27)
EGFR: 64 ML/MIN/1.73
EOSINOPHIL # BLD AUTO: 0.2 X10E3/UL (ref 0–0.4)
EOSINOPHIL NFR BLD AUTO: 3 %
ERYTHROCYTE [DISTWIDTH] IN BLOOD BY AUTOMATED COUNT: 13.2 % (ref 11.6–15.4)
EST. AVERAGE GLUCOSE BLD GHB EST-MCNC: 140 MG/DL
GLOBULIN SER-MCNC: 2.7 G/DL (ref 1.5–4.5)
GLUCOSE SERPL-MCNC: 121 MG/DL (ref 70–99)
HBA1C MFR BLD: 6.5 % (ref 4.8–5.6)
HCT VFR BLD AUTO: 46.5 % (ref 37.5–51)
HDLC SERPL-MCNC: 31 MG/DL
HGB BLD-MCNC: 15.8 G/DL (ref 13–17.7)
IMM GRANULOCYTES # BLD: 0 X10E3/UL (ref 0–0.1)
IMM GRANULOCYTES NFR BLD: 0 %
LDLC SERPL CALC-MCNC: 90 MG/DL (ref 0–99)
LDLC/HDLC SERPL: 2.9 RATIO (ref 0–3.6)
LYMPHOCYTES # BLD AUTO: 2.4 X10E3/UL (ref 0.7–3.1)
LYMPHOCYTES NFR BLD AUTO: 36 %
MCH RBC QN AUTO: 28.7 PG (ref 26.6–33)
MCHC RBC AUTO-ENTMCNC: 34 G/DL (ref 31.5–35.7)
MCV RBC AUTO: 85 FL (ref 79–97)
MONOCYTES # BLD AUTO: 0.6 X10E3/UL (ref 0.1–0.9)
MONOCYTES NFR BLD AUTO: 8 %
NEUTROPHILS # BLD AUTO: 3.5 X10E3/UL (ref 1.4–7)
NEUTROPHILS NFR BLD AUTO: 52 %
PLATELET # BLD AUTO: 249 X10E3/UL (ref 150–450)
POTASSIUM SERPL-SCNC: 4.5 MMOL/L (ref 3.5–5.2)
PROT SERPL-MCNC: 7.6 G/DL (ref 6–8.5)
RBC # BLD AUTO: 5.5 X10E6/UL (ref 4.14–5.8)
SL AMB VLDL CHOLESTEROL CALC: 30 MG/DL (ref 5–40)
SODIUM SERPL-SCNC: 140 MMOL/L (ref 134–144)
TRIGL SERPL-MCNC: 174 MG/DL (ref 0–149)
WBC # BLD AUTO: 6.8 X10E3/UL (ref 3.4–10.8)

## 2023-02-09 NOTE — TELEPHONE ENCOUNTER
----- Message from Kendra Nava MD sent at 2/9/2023  1:50 PM EST -----  Call patient with lab result-Labs look better  Should have 6-month diabetic follow-up medical management visit

## 2023-02-22 ENCOUNTER — OFFICE VISIT (OUTPATIENT)
Dept: PAIN MEDICINE | Facility: CLINIC | Age: 58
End: 2023-02-22

## 2023-02-22 VITALS
SYSTOLIC BLOOD PRESSURE: 128 MMHG | HEART RATE: 68 BPM | WEIGHT: 252 LBS | DIASTOLIC BLOOD PRESSURE: 78 MMHG | TEMPERATURE: 98 F | HEIGHT: 68 IN | BODY MASS INDEX: 38.19 KG/M2

## 2023-02-22 DIAGNOSIS — M51.26 LUMBAR DISC HERNIATION: ICD-10-CM

## 2023-02-22 DIAGNOSIS — G89.4 CHRONIC PAIN SYNDROME: Primary | ICD-10-CM

## 2023-02-22 DIAGNOSIS — G89.29 CHRONIC BILATERAL LOW BACK PAIN WITHOUT SCIATICA: ICD-10-CM

## 2023-02-22 DIAGNOSIS — M54.50 CHRONIC BILATERAL LOW BACK PAIN WITHOUT SCIATICA: ICD-10-CM

## 2023-02-22 DIAGNOSIS — M48.062 SPINAL STENOSIS OF LUMBAR REGION WITH NEUROGENIC CLAUDICATION: ICD-10-CM

## 2023-02-22 DIAGNOSIS — M54.16 LUMBAR RADICULOPATHY: ICD-10-CM

## 2023-02-22 RX ORDER — GABAPENTIN 300 MG/1
CAPSULE ORAL
Qty: 120 CAPSULE | Refills: 2 | Status: SHIPPED | OUTPATIENT
Start: 2023-02-22

## 2023-02-22 NOTE — PROGRESS NOTES
Assessment:  1  Chronic pain syndrome    2  Chronic bilateral low back pain without sciatica    3  Lumbar radiculopathy    4  Lumbar disc herniation    5  Spinal stenosis of lumbar region with neurogenic claudication        Plan:  While the patient was in the office today, I did have a thorough conversation with the patient regarding their chronic pain syndrome, symptoms, and medication regimen/treatment plan  I discussed with the patient that at this point time I would decrease his current gabapentin prescription down to 1200 mg a day or 4 times daily dosing and send a 30-day prescription with 2 refills and then in 3 to 4 weeks depending on how he feels and if he is ready he could decrease the gabapentin down to 3 times daily dosing or 900 mg a day until his next office visit and see how he does  I advised the patient that if he notices a definite increase in pain that is not tolerable and he would like to he could always go back up to the 1200 mg a day dosing  I discussed with the patient that they should not drive or operate machinery until they see how the medication changes affects them and if they have any side effects or issues, they are to call our office and make us aware so we can take the appropriate action and provide changes to the medications or treatment plan  The patient was agreeable and verbalized an understanding  I encouraged the patient to continue to increase his activity, as tolerated, allowing pain to be his guide and stressed the importance of continuing the home exercise and stretching program   The patient was agreeable and verbalized an understanding  The patient will follow-up in 12 weeks for medication prescription refill and reevaluation  The patient was advised to contact the office should their symptoms worsen in the interim  The patient was agreeable and verbalized an understanding  History of Present Illness:     The patient is a 61 y o  male last seen on 9/7/2022 who presents for a follow up office visit in regards to chronic pain syndrome, as the patient's pain has been ongoing for greater than a year, secondary to lumbar disc herniation with stenosis and radiculopathy  The patient currently reports that since his last office visit overall he feels his pain symptoms have continued to slowly and steadily improve over time and with the use of the gabapentin and since his last office visit he was able to decrease the gabapentin from 1500 mg a day down to 1200 mg a day and made that decrease approximately 3 weeks ago  The patient reports he has not noticed any significant change or increase in his pain since decreasing the gabapentin and would like to continue on the 1200 mg a day for another 3 to 4 weeks and then possibly see if he could titrated down again to 900 mg a day as he would like to continue to very slowly over time and see what is the least amount of gabapentin he can take or even if he can eventually get off of it, but he would like to do it slowly so we can continue the management and healing process of his pain symptoms  Patient presents today for regular medication follow-up visit  Current pain medications includes: Gabapentin 1200 mg daily  The patient reports that this regimen is providing 90% pain relief  The patient is reporting no side effects from this pain medication regimen  I have personally reviewed and/or updated the patient's past medical history, past surgical history, family history, social history, current medications, allergies, and vital signs today  Review of Systems:    Review of Systems   Respiratory: Negative for shortness of breath  Cardiovascular: Negative for chest pain  Gastrointestinal: Negative for constipation, diarrhea, nausea and vomiting  Musculoskeletal: Negative for arthralgias, gait problem, joint swelling and myalgias  Skin: Negative for rash  Neurological: Negative for dizziness, seizures and weakness  All other systems reviewed and are negative          Past Medical History:   Diagnosis Date   • Diabetes mellitus ([facility] )    • Hypertension    • Low back pain        Past Surgical History:   Procedure Laterality Date   • NO PAST SURGERIES         Family History   Problem Relation Age of Onset   • Lung cancer Mother    • Diabetes Father    • Hyperlipidemia Father    • Bipolar disorder Sister         1 disorder   • Other Sister         hx of suicide   • Substance Abuse Neg Hx        Social History     Occupational History   • Occupation: [employer]     Comment: full time   Tobacco Use   • Smoking status: Former     Packs/day:  00     Years:      Pack years:      Types: Cigarettes     Start date:      Quit date:      Years since quittin 1   • Smokeless tobacco: Never   Vaping Use   • Vaping Use: Never used   Substance and Sexual Activity   • Alcohol use: Yes     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   • Drug use: No   • Sexual activity: Yes     Partners: Female         Current Outpatient Medications:   •  atorvastatin (LIPITOR) 10 mg tablet, TAKE 1 TABLET (10 MG TOTAL) BY MOUTH DAILY AS DIRECTED, Disp: 90 tablet, Rfl: 1  •  gabapentin (NEURONTIN) 300 mg capsule, Take 1 PO QID, Disp: 120 capsule, Rfl: 2  •  hydrochlorothiazide (HYDRODIURIL) 25 mg tablet, TAKE 1 TABLET BY MOUTH EVERY DAY, Disp: 90 tablet, Rfl: 1  •  metFORMIN (GLUCOPHAGE) 850 mg tablet, TAKE 1 TABLET BY MOUTH TWICE A DAY, Disp: 180 tablet, Rfl: 1  •  Misc Natural Products (OSTEO BI-FLEX ADV TRIPLE ST) TABS, Take 1 tablet by mouth daily , Disp: , Rfl:   •  omeprazole (PriLOSEC) 20 mg delayed release capsule, TAKE 1 CAPSULE BY MOUTH EVERY DAY, Disp: 90 capsule, Rfl: 1  •  quinapril (ACCUPRIL) 40 MG tablet, TAKE 1 TABLET BY MOUTH EVERY DAY, Disp: 90 tablet, Rfl: 1    Allergies   Allergen Reactions   • Bamlanivimab Other (See Comments)     Face flush & red & stomach pain         Physical Exam:    /78 (BP Location: Left arm, Patient Position: Sitting, Cuff Size: Large)   Pulse 68   Temp 98 °F (36 7 °C)   Ht 5' 8" (1 727 m)   Wt 114 kg (252 lb)   BMI 38 32 kg/m²     Constitutional:normal, well developed, well nourished, alert, in no distress and non-toxic and no overt pain behavior  and overweight  Eyes:anicteric  HEENT:grossly intact  Neck:supple, symmetric, trachea midline and no masses   Pulmonary:even and unlabored  Cardiovascular:No edema or pitting edema present  Skin:Normal without rashes or lesions and well hydrated  Psychiatric:Mood and affect appropriate  Neurologic:Cranial Nerves II-XII grossly intact  Musculoskeletal:normal      Imaging  No orders to display         No orders of the defined types were placed in this encounter

## 2023-03-23 LAB
LEFT EYE DIABETIC RETINOPATHY: NORMAL
RIGHT EYE DIABETIC RETINOPATHY: NORMAL

## 2023-03-23 PROCEDURE — 2023F DILAT RTA XM W/O RTNOPTHY: CPT | Performed by: NURSE PRACTITIONER

## 2023-03-25 DIAGNOSIS — K21.00 PEPTIC REFLUX ESOPHAGITIS: ICD-10-CM

## 2023-03-25 RX ORDER — OMEPRAZOLE 20 MG/1
CAPSULE, DELAYED RELEASE ORAL
Qty: 90 CAPSULE | Refills: 1 | Status: SHIPPED | OUTPATIENT
Start: 2023-03-25

## 2023-05-05 ENCOUNTER — OFFICE VISIT (OUTPATIENT)
Dept: PAIN MEDICINE | Facility: CLINIC | Age: 58
End: 2023-05-05

## 2023-05-05 VITALS
HEART RATE: 65 BPM | DIASTOLIC BLOOD PRESSURE: 82 MMHG | TEMPERATURE: 97.5 F | HEIGHT: 68 IN | WEIGHT: 250 LBS | SYSTOLIC BLOOD PRESSURE: 130 MMHG | BODY MASS INDEX: 37.89 KG/M2

## 2023-05-05 DIAGNOSIS — G89.4 CHRONIC PAIN SYNDROME: Primary | ICD-10-CM

## 2023-05-05 DIAGNOSIS — M48.062 SPINAL STENOSIS OF LUMBAR REGION WITH NEUROGENIC CLAUDICATION: ICD-10-CM

## 2023-05-05 DIAGNOSIS — M54.16 LUMBAR RADICULOPATHY: ICD-10-CM

## 2023-05-05 DIAGNOSIS — G89.29 CHRONIC BILATERAL LOW BACK PAIN WITHOUT SCIATICA: ICD-10-CM

## 2023-05-05 DIAGNOSIS — M51.26 LUMBAR DISC HERNIATION: ICD-10-CM

## 2023-05-05 DIAGNOSIS — M54.50 CHRONIC BILATERAL LOW BACK PAIN WITHOUT SCIATICA: ICD-10-CM

## 2023-05-05 RX ORDER — GABAPENTIN 300 MG/1
CAPSULE ORAL
Qty: 90 CAPSULE | Refills: 2 | Status: SHIPPED | OUTPATIENT
Start: 2023-05-05

## 2023-05-05 NOTE — PROGRESS NOTES
Assessment:  1  Chronic pain syndrome    2  Chronic bilateral low back pain without sciatica    3  Spinal stenosis of lumbar region with neurogenic claudication    4  Lumbar disc herniation    5  Lumbar radiculopathy        Plan:  The patient is a 59-year-old male with a history of chronic pain secondary to low back pain, lumbar intervertebral disc disorder with radiculopathy, lumbar spondylosis and lumbar spinal stenosis with neurogenic claudication who presents to the office with ongoing bilateral low back pain and intermittent pain that radiates into the left hip that is unchanged since his last office visit  At this time, patient would like to continue to taper his gabapentin dosing  He will continue taking gabapentin 300 mg 1 tablet 3 times a day for the next 3 to 4 weeks, then will decrease this dosing to twice a day  He will follow back up in 12 weeks for reevaluation  Refills for gabapentin were sent to the patient's pharmacy  The patient will follow-up in 12 weeks for medication prescription refill and reevaluation  The patient was advised to contact the office should their symptoms worsen in the interim  The patient was agreeable and verbalized an understanding  History of Present Illness: The patient is a 61 y o  male with history of chronic pain secondary to low back pain, lumbar intervertebral disc disorder with radiculopathy, lumbar spondylosis and lumbar spinal stenosis with neurogenic claudication  He was last seen on 2/22/2023 where he was instructed he could taper his gabapentin 300 mg from 4 times a day dosing to 3 times a day dosing  He states he has been taking his gabapentin 3 times a day over the last 3 weeks and continues to receive relief  He presents to the office with ongoing bilateral low back pain that intermittently radiates into the left hip  He states his pain is the same since the last office visit and occasional but worse in the morning    He rates the quality of his pain as dull/aching and is currently rating his pain a 2/10 on a numeric scale  Her pain medications include gabapentin 300 mg 1 tablet 3 times a day which is providing him 90% relief of his pain without side effects  I have personally reviewed and/or updated the patient's past medical history, past surgical history, family history, social history, current medications, allergies, and vital signs today  Review of Systems:    Review of Systems   Respiratory: Negative for shortness of breath  Cardiovascular: Negative for chest pain  Gastrointestinal: Negative for constipation, diarrhea, nausea and vomiting  Musculoskeletal: Negative for arthralgias, gait problem, joint swelling and myalgias  Skin: Negative for rash  Neurological: Positive for dizziness  Negative for seizures and weakness  All other systems reviewed and are negative          Past Medical History:   Diagnosis Date    Diabetes mellitus ([facility] [address] )     Hypertension     Low back pain        Past Surgical History:   Procedure Laterality Date    NO PAST SURGERIES         Family History   Problem Relation Age of Onset    Lung cancer Mother     Diabetes Father     Hyperlipidemia Father     Bipolar disorder Sister         1 disorder    Other Sister         hx of suicide    Substance Abuse Neg Hx        Social History     Occupational History    Occupation: [employer]     Comment: full time   Tobacco Use    Smoking status: Former     Packs/day: 1 00     Years: 14 00     Pack years: 14 00     Types: Cigarettes     Start date: 35     Quit date:      Years since quittin 3    Smokeless tobacco: Never   Vaping Use    Vaping Use: Never used   Substance and Sexual Activity    Alcohol use: Yes     Comment: 6 per year -- [employer]    Drug use: No    Sexual activity: Yes     Partners: Female         Current Outpatient Medications:     atorvastatin (LIPITOR) 10 mg tablet, TAKE 1 TABLET (10 MG TOTAL) BY MOUTH DAILY AS "DIRECTED, Disp: 90 tablet, Rfl: 1    gabapentin (NEURONTIN) 300 mg capsule, Take 1 PO TID, Disp: 90 capsule, Rfl: 2    hydrochlorothiazide (HYDRODIURIL) 25 mg tablet, TAKE 1 TABLET BY MOUTH EVERY DAY, Disp: 90 tablet, Rfl: 1    lisinopril (ZESTRIL) 40 mg tablet, Take 1 tablet (40 mg total) by mouth daily, Disp: 90 tablet, Rfl: 1    metFORMIN (GLUCOPHAGE) 850 mg tablet, TAKE 1 TABLET BY MOUTH TWICE A DAY, Disp: 180 tablet, Rfl: 1    Misc Natural Products (OSTEO BI-FLEX ADV TRIPLE ST) TABS, Take 1 tablet by mouth daily , Disp: , Rfl:     omeprazole (PriLOSEC) 20 mg delayed release capsule, TAKE 1 CAPSULE BY MOUTH EVERY DAY, Disp: 90 capsule, Rfl: 1    Allergies   Allergen Reactions    Bamlanivimab Other (See Comments)     Face flush & red & stomach pain         Physical Exam:    /82 (BP Location: Left arm, Patient Position: Sitting, Cuff Size: Large)   Pulse 65   Temp 97 5 °F (36 4 °C)   Ht 5' 8\" (1 727 m)   Wt 113 kg (250 lb)   BMI 38 01 kg/m²     Constitutional:normal, well developed, well nourished, alert, in no distress and non-toxic and no overt pain behavior  Eyes:anicteric  HEENT:grossly intact  Neck:supple, symmetric, trachea midline and no masses   Pulmonary:even and unlabored  Cardiovascular:No edema or pitting edema present  Skin:Normal without rashes or lesions and well hydrated  Psychiatric:Mood and affect appropriate  Neurologic:Cranial Nerves II-XII grossly intact  Musculoskeletal:normal      Imaging  No orders to display         No orders of the defined types were placed in this encounter      "

## 2023-07-13 DIAGNOSIS — I10 BENIGN ESSENTIAL HYPERTENSION: ICD-10-CM

## 2023-07-13 RX ORDER — HYDROCHLOROTHIAZIDE 25 MG/1
TABLET ORAL
Qty: 90 TABLET | Refills: 1 | Status: SHIPPED | OUTPATIENT
Start: 2023-07-13

## 2023-07-19 DIAGNOSIS — E11.9 TYPE 2 DIABETES MELLITUS WITHOUT COMPLICATION, WITHOUT LONG-TERM CURRENT USE OF INSULIN (HCC): ICD-10-CM

## 2023-07-24 ENCOUNTER — TELEPHONE (OUTPATIENT)
Dept: FAMILY MEDICINE CLINIC | Facility: CLINIC | Age: 58
End: 2023-07-24

## 2023-07-24 ENCOUNTER — OFFICE VISIT (OUTPATIENT)
Dept: PAIN MEDICINE | Facility: CLINIC | Age: 58
End: 2023-07-24
Payer: COMMERCIAL

## 2023-07-24 VITALS
HEART RATE: 64 BPM | TEMPERATURE: 97.6 F | HEIGHT: 68 IN | SYSTOLIC BLOOD PRESSURE: 132 MMHG | DIASTOLIC BLOOD PRESSURE: 82 MMHG | BODY MASS INDEX: 38.8 KG/M2 | WEIGHT: 256 LBS

## 2023-07-24 DIAGNOSIS — M51.26 LUMBAR DISC HERNIATION: ICD-10-CM

## 2023-07-24 DIAGNOSIS — G89.29 CHRONIC BILATERAL LOW BACK PAIN WITHOUT SCIATICA: ICD-10-CM

## 2023-07-24 DIAGNOSIS — M47.816 LUMBAR SPONDYLOSIS: Primary | ICD-10-CM

## 2023-07-24 DIAGNOSIS — E11.9 TYPE 2 DIABETES MELLITUS WITHOUT COMPLICATION, WITHOUT LONG-TERM CURRENT USE OF INSULIN (HCC): Primary | ICD-10-CM

## 2023-07-24 DIAGNOSIS — M54.50 CHRONIC BILATERAL LOW BACK PAIN WITHOUT SCIATICA: ICD-10-CM

## 2023-07-24 PROCEDURE — 99213 OFFICE O/P EST LOW 20 MIN: CPT | Performed by: PHYSICIAN ASSISTANT

## 2023-07-24 NOTE — TELEPHONE ENCOUNTER
Labs ordered-please inform patient to fast and also submit a urine sample to check protein in urine from diabetes.

## 2023-07-24 NOTE — PROGRESS NOTES
Assessment:  1. Lumbar spondylosis    2. Chronic bilateral low back pain without sciatica    3. Lumbar disc herniation        Plan:  While the patient was in the office today, I did have a thorough conversation regarding their chronic pain syndrome, medication management, and treatment plan options. The patient will continue to wean off of the gabapentin as his pain has been very well controlled. He will go down to 300 mg once a day for 2 weeks and then discontinue it. He was advised to restart the medication however if he feels his pain increases. Overall, however he has done very well since the lumbar RFA procedure. He is aware that this can be repeated. Follow-up on a as needed basis if the pain changes or worsens. The patient was agreeable and verbalized an understanding. History of Present Illness: The patient is a 61 y.o. male last seen on 5/5/2023 who presents for a follow up office visit in regards to chronic pain secondary to lumbar spondylosis. The patient currently reports chronic low back pain that he presently rates a 1 out of 10 on the pain scale and at this point describes it as an occasional pressure-like pain. He denies any lower extremity radicular features. He has been working on reducing his gabapentin which she is now taking 300 mg twice daily. He is looking forward to completely discontinuing medication as his pain has been so well controlled since the lumbar RFA procedure done previously. I have personally reviewed and/or updated the patient's past medical history, past surgical history, family history, social history, current medications, allergies, and vital signs today. Review of Systems:    Review of Systems   Respiratory: Negative for shortness of breath. Cardiovascular: Negative for chest pain. Gastrointestinal: Negative for constipation, diarrhea, nausea and vomiting.    Musculoskeletal: Negative for arthralgias, gait problem, joint swelling and myalgias. Skin: Negative for rash. Neurological: Positive for dizziness. Negative for seizures and weakness. All other systems reviewed and are negative.         Past Medical History:   Diagnosis Date   • Diabetes mellitus ([address])    • Hypertension    • Low back pain        Past Surgical History:   Procedure Laterality Date   • NO PAST SURGERIES         Family History   Problem Relation Age of Onset   • Lung cancer Mother    • Diabetes Father    • Hyperlipidemia Father    • Bipolar disorder Sister         1 disorder   • Other Sister         hx of suicide   • Substance Abuse Neg Hx        Social History     Occupational History   • Occupation:      Comment: full time   Tobacco Use   • Smoking status: Former     Packs/day: 1.00     Years: 14.00     Total pack years: 14.00     Types: Cigarettes     Start date: 35     Quit date:      Years since quittin.5   • Smokeless tobacco: Never   Vaping Use   • Vaping Use: Never used   Substance and Sexual Activity   • Alcohol use: Yes     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   • Drug use: No   • Sexual activity: Yes     Partners: Female         Current Outpatient Medications:   •  atorvastatin (LIPITOR) 10 mg tablet, TAKE 1 TABLET (10 MG TOTAL) BY MOUTH DAILY AS DIRECTED, Disp: 90 tablet, Rfl: 1  •  hydrochlorothiazide (HYDRODIURIL) 25 mg tablet, TAKE 1 TABLET BY MOUTH EVERY DAY, Disp: 90 tablet, Rfl: 1  •  lisinopril (ZESTRIL) 40 mg tablet, Take 1 tablet (40 mg total) by mouth daily, Disp: 90 tablet, Rfl: 1  •  metFORMIN (GLUCOPHAGE) 850 mg tablet, TAKE 1 TABLET BY MOUTH TWICE A DAY, Disp: 180 tablet, Rfl: 1  •  Misc Natural Products (OSTEO BI-FLEX ADV TRIPLE ST) TABS, Take 1 tablet by mouth daily , Disp: , Rfl:   •  omeprazole (PriLOSEC) 20 mg delayed release capsule, TAKE 1 CAPSULE BY MOUTH EVERY DAY, Disp: 90 capsule, Rfl: 1    Allergies   Allergen Reactions   • Bamlanivimab Other (See Comments)     Face flush & red & stomach pain         Physical Exam:    BP 132/82 (BP Location: Left arm, Patient Position: Sitting, Cuff Size: Standard)   Pulse 64   Temp 97.6 °F (36.4 °C)   Ht 5' 8" (1.727 m)   Wt 116 kg (256 lb)   BMI 38.92 kg/m²     Constitutional:normal, well developed, well nourished, alert, in no distress and non-toxic and no overt pain behavior. Eyes:anicteric  HEENT:grossly intact  Neck:supple, symmetric, trachea midline and no masses   Pulmonary:even and unlabored  Cardiovascular:No edema or pitting edema present  Skin:Normal without rashes or lesions and well hydrated  Psychiatric:Mood and affect appropriate  Neurologic:Cranial Nerves II-XII grossly intact  Musculoskeletal:normal         Imaging  No orders to display         No orders of the defined types were placed in this encounter.

## 2023-07-30 LAB
ALBUMIN SERPL-MCNC: 4.9 G/DL (ref 3.8–4.9)
ALBUMIN/CREAT UR: 5 MG/G CREAT (ref 0–29)
ALBUMIN/GLOB SERPL: 2 {RATIO} (ref 1.2–2.2)
ALP SERPL-CCNC: 50 IU/L (ref 44–121)
ALT SERPL-CCNC: 57 IU/L (ref 0–44)
AST SERPL-CCNC: 31 IU/L (ref 0–40)
BILIRUB SERPL-MCNC: 0.9 MG/DL (ref 0–1.2)
BUN SERPL-MCNC: 26 MG/DL (ref 6–24)
BUN/CREAT SERPL: 23 (ref 9–20)
CALCIUM SERPL-MCNC: 10.2 MG/DL (ref 8.7–10.2)
CHLORIDE SERPL-SCNC: 99 MMOL/L (ref 96–106)
CHOLEST SERPL-MCNC: 147 MG/DL (ref 100–199)
CO2 SERPL-SCNC: 21 MMOL/L (ref 20–29)
CREAT SERPL-MCNC: 1.12 MG/DL (ref 0.76–1.27)
CREAT UR-MCNC: 69.6 MG/DL
EGFR: 76 ML/MIN/1.73
EST. AVERAGE GLUCOSE BLD GHB EST-MCNC: 151 MG/DL
GLOBULIN SER-MCNC: 2.5 G/DL (ref 1.5–4.5)
GLUCOSE SERPL-MCNC: 127 MG/DL (ref 70–99)
HBA1C MFR BLD: 6.9 % (ref 4.8–5.6)
HDLC SERPL-MCNC: 34 MG/DL
LDLC SERPL CALC-MCNC: 95 MG/DL (ref 0–99)
LDLC/HDLC SERPL: 2.8 RATIO (ref 0–3.6)
MICROALBUMIN UR-MCNC: 3.4 UG/ML
POTASSIUM SERPL-SCNC: 4.9 MMOL/L (ref 3.5–5.2)
PROT SERPL-MCNC: 7.4 G/DL (ref 6–8.5)
SL AMB VLDL CHOLESTEROL CALC: 18 MG/DL (ref 5–40)
SODIUM SERPL-SCNC: 137 MMOL/L (ref 134–144)
TRIGL SERPL-MCNC: 98 MG/DL (ref 0–149)

## 2023-08-09 ENCOUNTER — OFFICE VISIT (OUTPATIENT)
Dept: FAMILY MEDICINE CLINIC | Facility: CLINIC | Age: 58
End: 2023-08-09
Payer: COMMERCIAL

## 2023-08-09 VITALS
OXYGEN SATURATION: 99 % | DIASTOLIC BLOOD PRESSURE: 86 MMHG | BODY MASS INDEX: 38.83 KG/M2 | WEIGHT: 256.2 LBS | HEIGHT: 68 IN | SYSTOLIC BLOOD PRESSURE: 136 MMHG | HEART RATE: 66 BPM

## 2023-08-09 DIAGNOSIS — Z00.00 ANNUAL PHYSICAL EXAM: Primary | ICD-10-CM

## 2023-08-09 DIAGNOSIS — G89.29 CHRONIC BILATERAL LOW BACK PAIN WITHOUT SCIATICA: ICD-10-CM

## 2023-08-09 DIAGNOSIS — M54.50 CHRONIC BILATERAL LOW BACK PAIN WITHOUT SCIATICA: ICD-10-CM

## 2023-08-09 DIAGNOSIS — E78.5 HYPERLIPIDEMIA ASSOCIATED WITH TYPE 2 DIABETES MELLITUS (HCC): ICD-10-CM

## 2023-08-09 DIAGNOSIS — E11.9 TYPE 2 DIABETES MELLITUS WITHOUT COMPLICATION, WITHOUT LONG-TERM CURRENT USE OF INSULIN (HCC): ICD-10-CM

## 2023-08-09 DIAGNOSIS — I10 BENIGN ESSENTIAL HYPERTENSION: ICD-10-CM

## 2023-08-09 DIAGNOSIS — E11.69 HYPERLIPIDEMIA ASSOCIATED WITH TYPE 2 DIABETES MELLITUS (HCC): ICD-10-CM

## 2023-08-09 DIAGNOSIS — E66.01 CLASS 2 SEVERE OBESITY DUE TO EXCESS CALORIES WITH SERIOUS COMORBIDITY AND BODY MASS INDEX (BMI) OF 39.0 TO 39.9 IN ADULT (HCC): ICD-10-CM

## 2023-08-09 DIAGNOSIS — Z12.11 COLON CANCER SCREENING: ICD-10-CM

## 2023-08-09 PROCEDURE — 99396 PREV VISIT EST AGE 40-64: CPT | Performed by: FAMILY MEDICINE

## 2023-08-09 PROCEDURE — 99214 OFFICE O/P EST MOD 30 MIN: CPT | Performed by: FAMILY MEDICINE

## 2023-08-09 NOTE — PROGRESS NOTES
Subjective:   Chief Complaint   Patient presents with   • Physical Exam     Lab review        Patient ID: Amy Burroughs is a 60 y.o. male. Patient comes in for 6-month medical follow-up and annual wellness. Recent labs are reviewed. 1) diabetes-good control with current meds. 2) hyperlipidemia-good levels. 3) hypertension good control. 4) chronic back pain-has finished with pain management has had some improvement with nerve ablation and injection therapy. 5) obesity-continues to be an issue. The following portions of the patient's history were reviewed and updated as appropriate: allergies, current medications, past family history, past medical history, past social history, past surgical history and problem list.    Review of Systems   Constitutional: Negative for appetite change, chills, diaphoresis and fever. HENT: Negative for congestion, ear pain, rhinorrhea, sinus pressure and sore throat. Eyes: Negative for discharge, redness and itching. Respiratory: Negative for cough, shortness of breath and wheezing. Cardiovascular: Negative for chest pain and palpitations. Rapid or slow heart rate   Gastrointestinal: Negative for diarrhea, nausea and vomiting. Objective:  Vitals:    08/09/23 0844   BP: 136/86   Pulse: 66   SpO2: 99%   Weight: 116 kg (256 lb 3.2 oz)   Height: 5' 8" (1.727 m)      Physical Exam  Vitals reviewed. Constitutional:       General: He is not in acute distress. Appearance: He is well-developed. He is not ill-appearing. HENT:      Head: Normocephalic and atraumatic. Right Ear: Tympanic membrane and external ear normal. No drainage. Left Ear: Tympanic membrane normal. No drainage. Nose: No congestion. Mouth/Throat:      Pharynx: No oropharyngeal exudate or posterior oropharyngeal erythema. Eyes:      General:         Right eye: No discharge. Left eye: No discharge.       Extraocular Movements: Extraocular movements intact. Conjunctiva/sclera: Conjunctivae normal.      Pupils: Pupils are equal, round, and reactive to light. Neck:      Thyroid: No thyromegaly. Cardiovascular:      Rate and Rhythm: Normal rate and regular rhythm. Pulses: no weak pulses          Dorsalis pedis pulses are 2+ on the right side and 2+ on the left side. Posterior tibial pulses are 2+ on the right side and 2+ on the left side. Heart sounds: Normal heart sounds. Pulmonary:      Effort: Pulmonary effort is normal. No respiratory distress. Breath sounds: No wheezing or rales. Musculoskeletal:      Cervical back: Normal range of motion and neck supple. Right lower leg: No edema. Left lower leg: No edema. Feet:      Right foot:      Skin integrity: No ulcer, skin breakdown, erythema, warmth, callus or dry skin. Left foot:      Skin integrity: No ulcer, skin breakdown, erythema, warmth, callus or dry skin. Lymphadenopathy:      Cervical: No cervical adenopathy. Skin:     Findings: No rash. Neurological:      Mental Status: He is alert. Psychiatric:         Mood and Affect: Mood normal.         Behavior: Behavior normal.         Patient's shoes and socks removed. Right Foot/Ankle   Right Foot Inspection  Skin Exam: skin normal and skin intact. No dry skin, no warmth, no callus, no erythema, no maceration, no abnormal color, no pre-ulcer, no ulcer and no callus. Toe Exam: ROM and strength within normal limits. Sensory   Vibration: intact  Proprioception: intact  Monofilament testing: intact    Vascular  Capillary refills: < 3 seconds  The right DP pulse is 2+. The right PT pulse is 2+. Left Foot/Ankle  Left Foot Inspection  Skin Exam: skin normal and skin intact. No dry skin, no warmth, no erythema, no maceration, normal color, no pre-ulcer, no ulcer and no callus. Toe Exam: ROM and strength within normal limits.      Sensory   Vibration: intact  Proprioception: intact  Monofilament testing: intact    Vascular  Capillary refills: < 3 seconds  The left DP pulse is 2+. The left PT pulse is 2+. Assign Risk Category  No deformity present  No loss of protective sensation  No weak pulses  Risk: 0      Assessment/Plan:    No problem-specific Assessment & Plan notes found for this encounter. Diagnoses and all orders for this visit:    Annual physical exam    Type 2 diabetes mellitus without complication, without long-term current use of insulin ([address])    Hyperlipidemia associated with type 2 diabetes mellitus ([address])    Benign essential hypertension    Class 2 severe obesity due to excess calories with serious comorbidity and body mass index (BMI) of 39.0 to 39.9 in adult [facility])    Chronic bilateral low back pain without sciatica    Colon cancer screening  -     Cologuard    BMI 38.0-38.9,adult          Continue current medications. Continue work on diet, exercise, weight loss. Order for Cologines was sent. You should have blood work and office visit in 6 months.

## 2023-08-09 NOTE — PROGRESS NOTES
[address] [facility], [address]    NAME: Ulices Paez  AGE: 60 y.o. SEX: male  : 1965     DATE: 2023     Assessment and Plan:     Problem List Items Addressed This Visit        Endocrine    Type 2 diabetes mellitus without complication, without long-term current use of insulin ([address]) - Primary    Hyperlipidemia associated with type 2 diabetes mellitus ([address])       Cardiovascular and Mediastinum    Benign essential hypertension       Other    Class 2 severe obesity due to excess calories with serious comorbidity and body mass index (BMI) of 39.0 to 39.9 in adult [facility])    Chronic bilateral low back pain without sciatica   Other Visit Diagnoses     Colon cancer screening        Relevant Orders    Cologuard    Annual physical exam        BMI 38.0-38.9,adult              Immunizations and preventive care screenings were discussed with patient today. Appropriate education was printed on patient's after visit summary. Discussed risks and benefits of prostate cancer screening. We discussed the controversial history of PSA screening for prostate cancer in the [facility] as well as the risk of over detection and over treatment of prostate cancer by way of PSA screening. The patient understands that PSA blood testing is an imperfect way to screen for prostate cancer and that elevated PSA levels in the blood may also be caused by infection, inflammation, prostatic trauma or manipulation, urological procedures, or by benign prostatic enlargement. The role of the digital rectal examination in prostate cancer screening was also discussed and I discussed with him that there is large interobserver variability in the findings of digital rectal examination. Counseling:  Alcohol/drug use: discussed moderation in alcohol intake, the recommendations for healthy alcohol use, and avoidance of illicit drug use.   Dental Health: discussed importance of regular tooth brushing, flossing, and dental visits. · Exercise: the importance of regular exercise/physical activity was discussed. Recommend exercise 3-5 times per week for at least 30 minutes. BMI Counseling: Body mass index is 38.96 kg/m². The BMI is above normal. Nutrition recommendations include decreasing portion sizes and moderation in carbohydrate intake. Exercise recommendations include moderate physical activity 150 minutes/week. No pharmacotherapy was ordered. Rationale for BMI follow-up plan is due to patient being overweight or obese. Depression Screening and Follow-up Plan: Patient was screened for depression during today's encounter. They screened negative with a PHQ-2 score of 0. No follow-ups on file. Chief Complaint:     Chief Complaint   Patient presents with   • Physical Exam     Lab review      History of Present Illness:     Adult Annual Physical   Patient here for a comprehensive physical exam. The patient reports swee list.    Diet and Physical Activity  · Diet/Nutrition: well balanced diet and consuming 3-5 servings of fruits/vegetables daily. · Exercise: moderate cardiovascular exercise. Depression Screening  PHQ-2/9 Depression Screening    Little interest or pleasure in doing things: 0 - not at all  Feeling down, depressed, or hopeless: 0 - not at all  PHQ-2 Score: 0  PHQ-2 Interpretation: Negative depression screen       General Health  · Sleep: sleeps well and gets 7-8 hours of sleep on average. · Hearing: normal - bilateral.  · Vision: wears glasses. · Dental: regular dental visits, brushes teeth once daily and flosses teeth occasionally.  Health  · Symptoms include: none     Review of Systems:     Review of Systems   Constitutional: Negative for activity change, appetite change, diaphoresis and fatigue. HENT: Negative for congestion, sinus pressure and sore throat.     Respiratory: Negative for cough, chest tightness, shortness of breath and wheezing. Cardiovascular: Negative for chest pain, palpitations and leg swelling. Fast or slow heart rate   Gastrointestinal: Negative for abdominal pain, blood in stool, constipation, diarrhea, nausea and vomiting. Genitourinary: Negative for difficulty urinating, dysuria, frequency and hematuria. Musculoskeletal: Positive for arthralgias and back pain. Negative for gait problem, joint swelling and myalgias. Neurological: Negative for dizziness, light-headedness and headaches. Psychiatric/Behavioral: Negative for agitation, confusion, dysphoric mood and sleep disturbance. The patient is not nervous/anxious.        Past Medical History:     Past Medical History:   Diagnosis Date   • Diabetes mellitus ([address])    • Hypertension    • Low back pain       Past Surgical History:     Past Surgical History:   Procedure Laterality Date   • NO PAST SURGERIES        Family History:     Family History   Problem Relation Age of Onset   • Lung cancer Mother    • Diabetes Father    • Hyperlipidemia Father    • Bipolar disorder Sister         1 disorder   • Other Sister         hx of suicide   • Substance Abuse Neg Hx       Social History:     Social History     Socioeconomic History   • Marital status:      Spouse name: None   • Number of children: 0   • Years of education: None   • Highest education level: None   Occupational History   • Occupation:      Comment: full time   Tobacco Use   • Smoking status: Former     Packs/day: 1.00     Years: 14.00     Total pack years: 14.00     Types: Cigarettes     Start date:      Quit date:      Years since quittin.6   • Smokeless tobacco: Never   Vaping Use   • Vaping Use: Never used   Substance and Sexual Activity   • Alcohol use: Yes     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   • Drug use: No   • Sexual activity: Yes     Partners: Female   Other Topics Concern   • None   Social History Narrative    Lives alone without help available Social Determinants of Health     Financial Resource Strain: Not on file   Food Insecurity: Not on file   Transportation Needs: Not on file   Physical Activity: Not on file   Stress: Not on file   Social Connections: Not on file   Intimate Partner Violence: Not on file   Housing Stability: Not on file      Current Medications:     Current Outpatient Medications   Medication Sig Dispense Refill   • atorvastatin (LIPITOR) 10 mg tablet TAKE 1 TABLET (10 MG TOTAL) BY MOUTH DAILY AS DIRECTED 90 tablet 1   • hydrochlorothiazide (HYDRODIURIL) 25 mg tablet TAKE 1 TABLET BY MOUTH EVERY DAY 90 tablet 1   • lisinopril (ZESTRIL) 40 mg tablet Take 1 tablet (40 mg total) by mouth daily 90 tablet 1   • metFORMIN (GLUCOPHAGE) 850 mg tablet TAKE 1 TABLET BY MOUTH TWICE A  tablet 1   • Misc Natural Products (OSTEO BI-FLEX ADV TRIPLE ST) TABS Take 1 tablet by mouth daily      • omeprazole (PriLOSEC) 20 mg delayed release capsule TAKE 1 CAPSULE BY MOUTH EVERY DAY 90 capsule 1     No current facility-administered medications for this visit. Allergies: Allergies   Allergen Reactions   • Bamlanivimab Other (See Comments)     Face flush & red & stomach pain        Physical Exam:     /86   Pulse 66   Ht 5' 8" (1.727 m)   Wt 116 kg (256 lb 3.2 oz)   SpO2 99%   BMI 38.96 kg/m²     Physical Exam  Vitals and nursing note reviewed. Constitutional:       General: He is not in acute distress. Appearance: He is not ill-appearing. HENT:      Head: Normocephalic and atraumatic. Right Ear: Tympanic membrane, ear canal and external ear normal.      Left Ear: Tympanic membrane, ear canal and external ear normal.      Nose: Nose normal.      Mouth/Throat:      Mouth: Mucous membranes are moist.      Pharynx: No posterior oropharyngeal erythema. Eyes:      General:         Right eye: No discharge. Left eye: No discharge. Extraocular Movements: Extraocular movements intact.       Conjunctiva/sclera: Conjunctivae normal.      Pupils: Pupils are equal, round, and reactive to light. Neck:      Thyroid: No thyromegaly. Vascular: No carotid bruit. Trachea: No tracheal deviation. Cardiovascular:      Rate and Rhythm: Normal rate and regular rhythm. Heart sounds: Normal heart sounds. No murmur heard. Pulmonary:      Effort: Pulmonary effort is normal. No respiratory distress. Breath sounds: Normal breath sounds. No wheezing. Abdominal:      General: Bowel sounds are normal. There is no distension. Palpations: Abdomen is soft. There is no mass. Tenderness: There is no abdominal tenderness. There is no guarding. Musculoskeletal:      Cervical back: Normal range of motion and neck supple. Right lower leg: No edema. Left lower leg: No edema. Lymphadenopathy:      Cervical: No cervical adenopathy. Neurological:      General: No focal deficit present. Mental Status: He is alert and oriented to person, place, and time. Cranial Nerves: No cranial nerve deficit.       Deep Tendon Reflexes: Reflexes normal.   Psychiatric:         Mood and Affect: Mood normal.         Behavior: Behavior normal.          Yudith Melara MD  [id number] [facility],[address]

## 2023-08-09 NOTE — PATIENT INSTRUCTIONS
Continue current medications. Continue work on diet, exercise, weight loss. Order for Cologuard was sent. You should have blood work and office visit in 6 months. Call after the first year for blood work order with follow-up visit here in approximately 6 months with new PCP. Wellness Visit for Adults   AMBULATORY CARE:   A wellness visit  is when you see your healthcare provider to get screened for health problems. Your healthcare provider will also give you advice on how to stay healthy. Write down your questions so you remember to ask them. Ask your healthcare provider how often you should have a wellness visit. What happens at a wellness visit:  Your healthcare provider will ask about your health, and your family history of health problems. This includes high blood pressure, heart disease, and cancer. He or she will ask if you have symptoms that concern you, if you smoke, and about your mood. You may also be asked about your intake of medicines, supplements, food, and alcohol. Any of the following may be done: Your weight  will be checked. Your height may also be checked so your body mass index (BMI) can be calculated. Your BMI shows if you are at a healthy weight. Your blood pressure  and heart rate will be checked. Your temperature may also be checked. Blood and urine tests  may be done. Blood tests may be done to check your cholesterol levels. Abnormal cholesterol levels increase your risk for heart disease and stroke. You may also need a blood or urine test to check for diabetes if you are at increased risk. Urine tests may be done to look for signs of an infection or kidney disease. A physical exam  includes checking your heartbeat and lungs with a stethoscope. Your healthcare provider may also check your skin to look for sun damage. Screening tests  may be recommended. A screening test is done to check for diseases that may not cause symptoms.  The screening tests you may need depend on your age, gender, family history, and lifestyle habits. For example, colorectal screening may be recommended if you are 48years old or older. Screening tests you need if you are a woman:   A Pap smear  is used to screen for cervical cancer. Pap smears are usually done every 3 to 5 years depending on your age. You may need them more often if you have had abnormal Pap smear test results in the past. Ask your healthcare provider how often you should have a Pap smear. A mammogram  is an x-ray of your breasts to screen for breast cancer. Experts recommend mammograms every 2 years starting at age 48 years. You may need a mammogram at age 52 years or younger if you have an increased risk for breast cancer. Talk to your healthcare provider about when you should start having mammograms and how often you need them. Vaccines you may need:   Get an influenza vaccine  every year. The influenza vaccine protects you from the flu. Several types of viruses cause the flu. The viruses change over time, so new vaccines are made each year. Get a tetanus-diphtheria (Td) booster vaccine  every 10 years. This vaccine protects you against tetanus and diphtheria. Tetanus is a severe infection that may cause painful muscle spasms and lockjaw. Diphtheria is a severe bacterial infection that causes a thick covering in the back of your mouth and throat. Get a human papillomavirus (HPV) vaccine  if you are female and aged 23 to 32 or male 23 to 24 and never received it. This vaccine protects you from HPV infection. HPV is the most common infection spread by sexual contact. HPV may also cause vaginal, penile, and anal cancers. Get a pneumococcal vaccine  if you are aged 72 years or older. The pneumococcal vaccine is an injection given to protect you from pneumococcal disease. Pneumococcal disease is an infection caused by pneumococcal bacteria. The infection may cause pneumonia, meningitis, or an ear infection.     Get a shingles vaccine  if you are 60 or older, even if you have had shingles before. The shingles vaccine is an injection to protect you from the varicella-zoster virus. This is the same virus that causes chickenpox. Shingles is a painful rash that develops in people who had chickenpox or have been exposed to the virus. How to eat healthy:  My Plate is a model for planning healthy meals. It shows the types and amounts of foods that should go on your plate. Fruits and vegetables make up about half of your plate, and grains and protein make up the other half. A serving of dairy is included on the side of your plate. The amount of calories and serving sizes you need depends on your age, gender, weight, and height. Examples of healthy foods are listed below:  Eat a variety of vegetables  such as dark green, red, and orange vegetables. You can also include canned vegetables low in sodium (salt) and frozen vegetables without added butter or sauces. Eat a variety of fresh fruits , canned fruit in 100% juice, frozen fruit, and dried fruit. Include whole grains. At least half of the grains you eat should be whole grains. Examples include whole-wheat bread, wheat pasta, brown rice, and whole-grain cereals such as oatmeal.    Eat a variety of protein foods such as seafood (fish and shellfish), lean meat, and poultry without skin (turkey and chicken). Examples of lean meats include pork leg, shoulder, or tenderloin, and beef round, sirloin, tenderloin, and extra lean ground beef. Other protein foods include eggs and egg substitutes, beans, peas, soy products, nuts, and seeds. Choose low-fat dairy products such as skim or 1% milk or low-fat yogurt, cheese, and cottage cheese. Limit unhealthy fats  such as butter, hard margarine, and shortening. Exercise:  Exercise at least 30 minutes per day on most days of the week. Some examples of exercise include walking, biking, dancing, and swimming.  You can also fit in more physical activity by taking the stairs instead of the elevator or parking farther away from stores. Include muscle strengthening activities 2 days each week. Regular exercise provides many health benefits. It helps you manage your weight, and decreases your risk for type 2 diabetes, heart disease, stroke, and high blood pressure. Exercise can also help improve your mood. Ask your healthcare provider about the best exercise plan for you. General health and safety guidelines:   Do not smoke. Nicotine and other chemicals in cigarettes and cigars can cause lung damage. Ask your healthcare provider for information if you currently smoke and need help to quit. E-cigarettes or smokeless tobacco still contain nicotine. Talk to your healthcare provider before you use these products. Limit alcohol. A drink of alcohol is 12 ounces of beer, 5 ounces of wine, or 1½ ounces of liquor. Lose weight, if needed. Being overweight increases your risk of certain health conditions. These include heart disease, high blood pressure, type 2 diabetes, and certain types of cancer. Protect your skin. Do not sunbathe or use tanning beds. Use sunscreen with a SPF 15 or higher. Apply sunscreen at least 15 minutes before you go outside. Reapply sunscreen every 2 hours. Wear protective clothing, hats, and sunglasses when you are outside. Drive safely. Always wear your seatbelt. Make sure everyone in your car wears a seatbelt. A seatbelt can save your life if you are in an accident. Do not use your cell phone when you are driving. This could distract you and cause an accident. Pull over if you need to make a call or send a text message. Practice safe sex. Use latex condoms if are sexually active and have more than one partner. Your healthcare provider may recommend screening tests for sexually transmitted infections (STIs). Wear helmets, lifejackets, and protective gear.   Always wear a helmet when you ride a bike or motorcycle, go skiing, or play sports that could cause a head injury. Wear protective equipment when you play sports. Wear a lifejacket when you are on a boat or doing water sports. © Copyright [employer] 2022 Information is for End User's use only and may not be sold, redistributed or otherwise used for commercial purposes. The above information is an  only. It is not intended as medical advice for individual conditions or treatments. Talk to your doctor, nurse or pharmacist before following any medical regimen to see if it is safe and effective for you. Weight Management   AMBULATORY CARE:   Why it is important to manage your weight:  Being overweight increases your risk of health conditions such as heart disease, high blood pressure, type 2 diabetes, and certain types of cancer. It can also increase your risk for osteoarthritis, sleep apnea, and other respiratory problems. Aim for a slow, steady weight loss. Even a small amount of weight loss can lower your risk of health problems. Risks of being overweight:  Extra weight can cause many health problems, including the following:  Diabetes (high blood sugar level)    High blood pressure or high cholesterol    Heart disease    Stroke    Gallbladder or liver disease    Cancer of the colon, breast, prostate, liver, or kidney    Sleep apnea    Arthritis or gout    Screening  is done to check for health conditions before you have signs or symptoms. If you are 28to 79years old, your blood sugar level may be checked every 3 years for signs of prediabetes or diabetes. Your healthcare provider will check your blood pressure at each visit. High blood pressure can lead to a stroke or other problems. Your provider may check for signs of heart disease, cancer, or other health problems. How to lose weight safely:  A safe and healthy way to lose weight is to eat fewer calories and get regular exercise.   You can lose up about 1 pound a week by decreasing the number of calories you eat by 500 calories each day. You can decrease calories by eating smaller portion sizes or by cutting out high-calorie foods. Read labels to find out how many calories are in the foods you eat. You can also burn calories with exercise such as walking, swimming, or biking. You will be more likely to keep weight off if you make these changes part of your lifestyle. Exercise at least 30 minutes per day on most days of the week. You can also fit in more physical activity by taking the stairs instead of the elevator or parking farther away from stores. Ask your healthcare provider about the best exercise plan for you. Healthy meal plan for weight management:  A healthy meal plan includes a variety of foods, contains fewer calories, and helps you stay healthy. A healthy meal plan includes the following:     Eat whole-grain foods more often. A healthy meal plan should contain fiber. Fiber is the part of grains, fruits, and vegetables that is not broken down by your body. Whole-grain foods are healthy and provide extra fiber in your diet. Some examples of whole-grain foods are whole-wheat breads and pastas, oatmeal, brown rice, and bulgur. Eat a variety of vegetables every day. Include dark, leafy greens such as spinach, kale, artis greens, and mustard greens. Eat yellow and orange vegetables such as carrots, sweet potatoes, and winter squash. Eat a variety of fruits every day. Choose fresh or canned fruit (canned in its own juice or light syrup) instead of juice. Fruit juice has very little or no fiber. Eat low-fat dairy foods. Drink fat-free (skim) milk or 1% milk. Eat fat-free yogurt and low-fat cottage cheese. Try low-fat cheeses such as mozzarella and other reduced-fat cheeses. Choose meat and other protein foods that are low in fat. Choose beans or other legumes such as split peas or lentils.  Choose fish, skinless poultry (chicken or turkey), or lean cuts of red meat (beef or pork). Before you cook meat or poultry, cut off any visible fat. Use less fat and oil. Try baking foods instead of frying them. Add less fat, such as margarine, sour cream, regular salad dressing and mayonnaise to foods. Eat fewer high-fat foods. Some examples of high-fat foods include french fries, doughnuts, ice cream, and cakes. Eat fewer sweets. Limit foods and drinks that are high in sugar. This includes candy, cookies, regular soda, and sweetened drinks. Ways to decrease calories:   Eat smaller portions. Use a small plate with smaller servings. Do not eat second helpings. When you eat at a restaurant, ask for a box and place half of your meal in the box before you eat. Share an entrée with someone else. Replace high-calorie snacks with healthy, low-calorie snacks. Choose fresh fruit, vegetables, fat-free rice cakes, or air-popped popcorn instead of potato chips, nuts, or chocolate. Choose water or calorie-free drinks instead of soda or sweetened drinks. Do not shop for groceries when you are hungry. You may be more likely to make unhealthy food choices. Take a grocery list of healthy foods and shop after you have eaten. Eat regular meals. Do not skip meals. Skipping meals can lead to overeating later in the day. This can make it harder for you to lose weight. Eat a healthy snack in place of a meal if you do not have time to eat a regular meal. Talk with a dietitian to help you create a meal plan and schedule that is right for you. Other things to consider as you try to lose weight:   Be aware of situations that may give you the urge to overeat, such as eating while watching television. Find ways to avoid these situations. For example, read a book, go for a walk, or do crafts. Meet with a weight loss support group or friends who are also trying to lose weight. This may help you stay motivated to continue working on your weight loss goals.     © Copyright [employer] 2022 Information is for End User's use only and may not be sold, redistributed or otherwise used for commercial purposes. The above information is an  only. It is not intended as medical advice for individual conditions or treatments. Talk to your doctor, nurse or pharmacist before following any medical regimen to see if it is safe and effective for you.

## 2023-08-29 LAB — COLOGUARD RESULT REPORTABLE: NEGATIVE

## 2023-09-05 ENCOUNTER — TELEPHONE (OUTPATIENT)
Dept: FAMILY MEDICINE CLINIC | Facility: CLINIC | Age: 58
End: 2023-09-05

## 2023-09-05 NOTE — TELEPHONE ENCOUNTER
----- Message from Pamela Monae MD sent at 9/4/2023  3:19 PM EDT -----  Call patient with lab result-Cologuard negative, repeat 3 years.

## 2023-09-22 DIAGNOSIS — K21.00 PEPTIC REFLUX ESOPHAGITIS: ICD-10-CM

## 2023-09-22 RX ORDER — OMEPRAZOLE 20 MG/1
CAPSULE, DELAYED RELEASE ORAL
Qty: 90 CAPSULE | Refills: 1 | Status: SHIPPED | OUTPATIENT
Start: 2023-09-22

## 2023-10-03 DIAGNOSIS — I10 BENIGN ESSENTIAL HYPERTENSION: ICD-10-CM

## 2023-10-03 RX ORDER — LISINOPRIL 40 MG/1
40 TABLET ORAL DAILY
Qty: 90 TABLET | Refills: 1 | Status: SHIPPED | OUTPATIENT
Start: 2023-10-03

## 2023-10-15 DIAGNOSIS — E11.69 HYPERLIPIDEMIA ASSOCIATED WITH TYPE 2 DIABETES MELLITUS: ICD-10-CM

## 2023-10-15 DIAGNOSIS — E78.5 HYPERLIPIDEMIA ASSOCIATED WITH TYPE 2 DIABETES MELLITUS: ICD-10-CM

## 2023-10-15 RX ORDER — ATORVASTATIN CALCIUM 10 MG/1
10 TABLET, FILM COATED ORAL DAILY
Qty: 90 TABLET | Refills: 1 | Status: SHIPPED | OUTPATIENT
Start: 2023-10-15

## 2024-01-14 DIAGNOSIS — I10 BENIGN ESSENTIAL HYPERTENSION: ICD-10-CM

## 2024-01-14 DIAGNOSIS — E11.9 TYPE 2 DIABETES MELLITUS WITHOUT COMPLICATION, WITHOUT LONG-TERM CURRENT USE OF INSULIN (HCC): ICD-10-CM

## 2024-01-16 RX ORDER — HYDROCHLOROTHIAZIDE 25 MG/1
TABLET ORAL
Qty: 90 TABLET | Refills: 1 | Status: SHIPPED | OUTPATIENT
Start: 2024-01-16

## 2024-01-29 ENCOUNTER — OFFICE VISIT (OUTPATIENT)
Dept: FAMILY MEDICINE CLINIC | Facility: CLINIC | Age: 59
End: 2024-01-29
Payer: COMMERCIAL

## 2024-01-29 VITALS
OXYGEN SATURATION: 99 % | TEMPERATURE: 97.6 F | SYSTOLIC BLOOD PRESSURE: 139 MMHG | HEART RATE: 76 BPM | WEIGHT: 255 LBS | DIASTOLIC BLOOD PRESSURE: 83 MMHG | BODY MASS INDEX: 38.77 KG/M2

## 2024-01-29 DIAGNOSIS — E11.9 TYPE 2 DIABETES MELLITUS WITHOUT COMPLICATION, WITHOUT LONG-TERM CURRENT USE OF INSULIN (HCC): Primary | ICD-10-CM

## 2024-01-29 DIAGNOSIS — E11.69 HYPERLIPIDEMIA ASSOCIATED WITH TYPE 2 DIABETES MELLITUS: ICD-10-CM

## 2024-01-29 DIAGNOSIS — E78.5 HYPERLIPIDEMIA ASSOCIATED WITH TYPE 2 DIABETES MELLITUS: ICD-10-CM

## 2024-01-29 LAB — SL AMB POCT HEMOGLOBIN AIC: 6.9 (ref ?–6.5)

## 2024-01-29 PROCEDURE — 83036 HEMOGLOBIN GLYCOSYLATED A1C: CPT | Performed by: NURSE PRACTITIONER

## 2024-01-29 PROCEDURE — 3044F HG A1C LEVEL LT 7.0%: CPT | Performed by: NURSE PRACTITIONER

## 2024-01-29 PROCEDURE — 3079F DIAST BP 80-89 MM HG: CPT | Performed by: NURSE PRACTITIONER

## 2024-01-29 PROCEDURE — 3075F SYST BP GE 130 - 139MM HG: CPT | Performed by: NURSE PRACTITIONER

## 2024-01-29 PROCEDURE — 99214 OFFICE O/P EST MOD 30 MIN: CPT | Performed by: NURSE PRACTITIONER

## 2024-01-29 NOTE — PATIENT INSTRUCTIONS
Continue Metformin 850 mg BID for diabetes control  Healthy diet-decrease sugars/carbs  Continue Lisinopril and HCTZ as ordered for BP control  Continue Atorvastatin 10 mg daily for cholesterol  F/up in 6 months for recheck  Have labs completed before next appointment    Type 2 Diabetes Management for Adults   AMBULATORY CARE:   Type 2 diabetes  is a disease that affects how your body uses glucose (sugar). Either your body cannot make enough insulin, or it cannot use the insulin correctly. It is important to keep diabetes controlled to prevent damage to your heart, blood vessels, and other organs. Management will help you feel well and enjoy your daily activities. Your diabetes care team providers can help you make a plan to fit diabetes care into your schedule. Your plan can change over time to fit your needs and your family's needs.       Have someone call your local emergency number (911 in the [place]) if:   You cannot be woken.    You have signs of diabetic ketoacidosis:     confusion, fatigue    vomiting    rapid heartbeat    fruity smelling breath    extreme thirst    dry mouth and skin    You have any of the following signs of a heart attack:      Squeezing, pressure, or pain in your chest    You may  also have any of the following:     Discomfort or pain in your back, neck, jaw, stomach, or arm    Shortness of breath    Nausea or vomiting    Lightheadedness or a sudden cold sweat    You have any of the following signs of a stroke:      Numbness or drooping on one side of your face     Weakness in an arm or leg    Confusion or difficulty speaking    Dizziness, a severe headache, or vision loss    Call your doctor or diabetes care team provider if:   You have a sore or wound that will not heal.    You have a change in the amount you urinate.    Your blood sugar levels are higher than your target goals.    You often have lower blood sugar levels than your target goals.    Your skin is red, dry, warm, or  swollen.    You have trouble coping with diabetes, or you feel anxious or depressed.    You have trouble following any part of your care plan, such as your meal plan.    You have questions or concerns about your condition or care.    What you need to know about high blood sugar levels:  High blood sugar levels may not cause any symptoms. You may feel more thirsty or urinate more often than usual. Over time, high blood sugar levels can damage your nerves, blood vessels, tissues, and organs. The following can increase your blood sugar levels:  Large meals or large amounts of carbohydrates at one time    Less physical activity    Stress    Illness    A lower dose of diabetes medicine or insulin, or a late dose    What you need to know about low blood sugar levels:  Symptoms include feeling shaky, dizzy, irritable, or confused. You can prevent symptoms by keeping your blood sugar levels from going too low.  Treat a low blood sugar level right away:      Drink 4 ounces of juice or have 1 tube of glucose gel.    Check your blood sugar level again 10 to 15 minutes later.    When the level goes back to normal, eat a meal or snack to prevent another decrease.       Keep glucose gel, raisins, or hard candy with you at all times to treat a low blood sugar level.     Your blood sugar level can get too low if you take diabetes medicine or insulin and do not eat enough food.     If you use insulin, check your blood sugar level before you exercise.      If your blood sugar level is below 100 mg/dL, eat 4 crackers or 2 ounces of raisins, or drink 4 ounces of juice.    Check your level every 30 minutes if you exercise longer than 1 hour.    You may need a snack during or after exercise.    What you can do to manage your blood sugar levels:   Check your blood sugar levels as directed and as needed.  Several items are available to use to check your levels. You may need to check by testing a drop of blood in a glucose monitor. You may  instead be given a continuous glucose monitoring (CGM) device. The device is worn at all times. The CGM checks your blood sugar level every 5 minutes. It sends results to an electronic device such as a smart phone. A CGM can be used with or without an insulin pump. You and your diabetes care team providers will decide on the best method for you. The goal for blood sugar levels before meals  is between 80 and 130 mg/dL and 2 hours after eating  is lower than 180 mg/dL.            Make healthy food choices.  Work with a dietitian to create a meal plan that works for you and your schedule. A dietitian can help you learn how to eat the right amount of carbohydrates (sugar and starchy foods) during your meals and snacks. Examples of carbohydrates are breads, cereals, rice, pasta, fruit, low-fat dairy, and sweets. Carbohydrates can raise your blood sugar level if you eat too many at one time.         Eat high-fiber foods as directed.  Fiber helps improve blood sugar levels. Fiber also lowers your risk for heart disease and other problems diabetes can cause. Examples of high-fiber foods include vegetables, whole-grain bread, and beans such as pena beans. Your dietitian can tell you how much fiber to have each day.         Get regular physical activity.  Physical activity can help you get to your target blood sugar level goal and manage your weight. Get at least 150 minutes of moderate to vigorous aerobic physical activity each week. Resistance training, such as lifting weights, should be done 3 times each week. Do not miss more than 2 days of physical activity in a row. Do not sit longer than 30 minutes at a time. Your healthcare provider can help you create an activity plan. The plan can include the best activities for you and can help you build your strength and endurance.            Maintain a healthy weight.  Ask your team what a healthy weight is for you. A healthy weight can help you control diabetes and prevent  heart disease. Ask your team to help you create a weight-loss plan, if needed. Even a loss of 3% to 7% of your excess body weight can help make a difference in managing diabetes. Your team will help you set a weight-loss goal, such as 10 to 15 pounds, or 5% of your extra weight. Together you and your team can set manageable weight-loss goals.    Take your diabetes medicine or insulin as directed.  You may need diabetes medicine, insulin, or both to help control your blood sugar levels. Your healthcare provider will teach you how and when to take your diabetes medicine or insulin. You will also be taught about side effects oral diabetes medicine can cause. Insulin may be injected or given through a pump or pen. You and your providers will decide on the best method for you:    An insulin pump  is an implanted device that gives your insulin 24 hours a day. An insulin pump prevents the need for multiple insulin injections in a day.         An insulin pen  is a device prefilled with the right amount of insulin.         You and your family members will be taught how to draw up and give insulin  if this is the best method for you. Your providers will also teach you how to dispose of needles and syringes.    You will learn how much insulin you need  and when to give it. You will be taught when not to give insulin. You will also be taught what to do if your blood sugar level drops too low. This may happen if you take insulin and do not eat the right amount of carbohydrates.    More ways to manage type 2 diabetes:   Wear medical alert identification.  Wear medical alert jewelry or carry a card that says you have diabetes. Ask your provider where to get these items.         Do not smoke.  Nicotine and other chemicals in cigarettes and cigars can cause lung and blood vessel damage. It also makes it more difficult to manage your diabetes. Ask your provider for information if you currently smoke and need help to quit. Do not use  e-cigarettes or smokeless tobacco in place of cigarettes or to help you quit. They still contain nicotine.    Check your feet each day for cuts, scratches, calluses, or other wounds.  Look for redness and swelling, and feel for warmth. Wear shoes that fit well. Check your shoes for rocks or other objects that can hurt your feet. Do not walk barefoot or wear shoes without socks. Wear cotton socks to help keep your feet dry.         Ask about vaccines you may need.  You have a higher risk for serious illness if you get the flu, pneumonia, COVID-19, or hepatitis. Ask your provider if you should get vaccines to prevent these or other diseases, and when to get the vaccines.    Talk to your provider if you become stressed about diabetes care.  Sometimes being able to fit diabetes care into your life can cause increased stress. The stress can cause you not to take care of yourself properly. Your provider can help by offering tips about self-care. A mental health provider can listen and offer help with self-care issues. Other types of counseling can help you make nutrition or physical activity changes.    Have your A1c checked as directed.  Your provider may check your A1c every 3 months, or 2 times each year if your diabetes is controlled. An A1c test shows the average amount of sugar in your blood over the past 2 to 3 months. Your provider will tell you what your A1c level should be.    Have screening tests as directed.  Your provider may recommend screening for complications of diabetes and other conditions that may develop. Some screenings may begin right away and some may happen within the first 5 years of diagnosis:    Examples of diabetes complications  include kidney problems, high cholesterol, high blood pressure, blood vessel problems, eye problems, and sleep apnea.    You may be screened for a low vitamin B level  if you take oral diabetes medicine for a long time.    You may be screened for polycystic ovarian  syndrome (PCOS)  if you are of childbearing age.    Follow up with your doctor or diabetes care team providers as directed:  You may need to have blood tests done before your follow-up visit. The test results will show if changes need to be made in your treatment or self-care. Talk to your provider if you cannot afford your medicine. Write down your questions so you remember to ask them during your visits.  © Copyright [employer] 2023 Information is for End User's use only and may not be sold, redistributed or otherwise used for commercial purposes.  The above information is an  only. It is not intended as medical advice for individual conditions or treatments. Talk to your doctor, nurse or pharmacist before following any medical regimen to see if it is safe and effective for you.

## 2024-01-29 NOTE — PROGRESS NOTES
[facility]        NAME: Dakota Lake is a 58 y.o. male  : 1965    MRN: [id number]  DATE: 2024  TIME: 6:25 PM    Assessment and Plan   Type 2 diabetes mellitus without complication, without long-term current use of insulin (HCC) [E11.9]  1. Type 2 diabetes mellitus without complication, without long-term current use of insulin (HCC)  Comprehensive metabolic panel    Albumin / creatinine urine ratio    Hemoglobin A1C    Comprehensive metabolic panel    Albumin / creatinine urine ratio    Hemoglobin A1C    POCT hemoglobin A1c      2. Hyperlipidemia associated with type 2 diabetes mellitus   Lipid panel    Lipid panel            Patient Instructions     Patient Instructions   Continue Metformin 850 mg BID for diabetes control  Healthy diet-decrease sugars/carbs  Continue Lisinopril and HCTZ as ordered for BP control  Continue Atorvastatin 10 mg daily for cholesterol  F/up in 6 months for recheck  Have labs completed before next appointment    Type 2 Diabetes Management for Adults   AMBULATORY CARE:   Type 2 diabetes  is a disease that affects how your body uses glucose (sugar). Either your body cannot make enough insulin, or it cannot use the insulin correctly. It is important to keep diabetes controlled to prevent damage to your heart, blood vessels, and other organs. Management will help you feel well and enjoy your daily activities. Your diabetes care team providers can help you make a plan to fit diabetes care into your schedule. Your plan can change over time to fit your needs and your family's needs.       Have someone call your local emergency number (911 in the [place]) if:   You cannot be woken.    You have signs of diabetic ketoacidosis:     confusion, fatigue    vomiting    rapid heartbeat    fruity smelling breath    extreme thirst    dry mouth and skin    You have any of the following signs of a heart attack:      Squeezing, pressure, or pain in your chest    You  may  also have any of the following:     Discomfort or pain in your back, neck, jaw, stomach, or arm    Shortness of breath    Nausea or vomiting    Lightheadedness or a sudden cold sweat    You have any of the following signs of a stroke:      Numbness or drooping on one side of your face     Weakness in an arm or leg    Confusion or difficulty speaking    Dizziness, a severe headache, or vision loss    Call your doctor or diabetes care team provider if:   You have a sore or wound that will not heal.    You have a change in the amount you urinate.    Your blood sugar levels are higher than your target goals.    You often have lower blood sugar levels than your target goals.    Your skin is red, dry, warm, or swollen.    You have trouble coping with diabetes, or you feel anxious or depressed.    You have trouble following any part of your care plan, such as your meal plan.    You have questions or concerns about your condition or care.    What you need to know about high blood sugar levels:  High blood sugar levels may not cause any symptoms. You may feel more thirsty or urinate more often than usual. Over time, high blood sugar levels can damage your nerves, blood vessels, tissues, and organs. The following can increase your blood sugar levels:  Large meals or large amounts of carbohydrates at one time    Less physical activity    Stress    Illness    A lower dose of diabetes medicine or insulin, or a late dose    What you need to know about low blood sugar levels:  Symptoms include feeling shaky, dizzy, irritable, or confused. You can prevent symptoms by keeping your blood sugar levels from going too low.  Treat a low blood sugar level right away:      Drink 4 ounces of juice or have 1 tube of glucose gel.    Check your blood sugar level again 10 to 15 minutes later.    When the level goes back to normal, eat a meal or snack to prevent another decrease.       Keep glucose gel, raisins, or hard candy with you at  all times to treat a low blood sugar level.     Your blood sugar level can get too low if you take diabetes medicine or insulin and do not eat enough food.     If you use insulin, check your blood sugar level before you exercise.      If your blood sugar level is below 100 mg/dL, eat 4 crackers or 2 ounces of raisins, or drink 4 ounces of juice.    Check your level every 30 minutes if you exercise longer than 1 hour.    You may need a snack during or after exercise.    What you can do to manage your blood sugar levels:   Check your blood sugar levels as directed and as needed.  Several items are available to use to check your levels. You may need to check by testing a drop of blood in a glucose monitor. You may instead be given a continuous glucose monitoring (CGM) device. The device is worn at all times. The CGM checks your blood sugar level every 5 minutes. It sends results to an electronic device such as a smart phone. A CGM can be used with or without an insulin pump. You and your diabetes care team providers will decide on the best method for you. The goal for blood sugar levels before meals  is between 80 and 130 mg/dL and 2 hours after eating  is lower than 180 mg/dL.            Make healthy food choices.  Work with a dietitian to create a meal plan that works for you and your schedule. A dietitian can help you learn how to eat the right amount of carbohydrates (sugar and starchy foods) during your meals and snacks. Examples of carbohydrates are breads, cereals, rice, pasta, fruit, low-fat dairy, and sweets. Carbohydrates can raise your blood sugar level if you eat too many at one time.         Eat high-fiber foods as directed.  Fiber helps improve blood sugar levels. Fiber also lowers your risk for heart disease and other problems diabetes can cause. Examples of high-fiber foods include vegetables, whole-grain bread, and beans such as pena beans. Your dietitian can tell you how much fiber to have each  day.         Get regular physical activity.  Physical activity can help you get to your target blood sugar level goal and manage your weight. Get at least 150 minutes of moderate to vigorous aerobic physical activity each week. Resistance training, such as lifting weights, should be done 3 times each week. Do not miss more than 2 days of physical activity in a row. Do not sit longer than 30 minutes at a time. Your healthcare provider can help you create an activity plan. The plan can include the best activities for you and can help you build your strength and endurance.            Maintain a healthy weight.  Ask your team what a healthy weight is for you. A healthy weight can help you control diabetes and prevent heart disease. Ask your team to help you create a weight-loss plan, if needed. Even a loss of 3% to 7% of your excess body weight can help make a difference in managing diabetes. Your team will help you set a weight-loss goal, such as 10 to 15 pounds, or 5% of your extra weight. Together you and your team can set manageable weight-loss goals.    Take your diabetes medicine or insulin as directed.  You may need diabetes medicine, insulin, or both to help control your blood sugar levels. Your healthcare provider will teach you how and when to take your diabetes medicine or insulin. You will also be taught about side effects oral diabetes medicine can cause. Insulin may be injected or given through a pump or pen. You and your providers will decide on the best method for you:    An insulin pump  is an implanted device that gives your insulin 24 hours a day. An insulin pump prevents the need for multiple insulin injections in a day.         An insulin pen  is a device prefilled with the right amount of insulin.         You and your family members will be taught how to draw up and give insulin  if this is the best method for you. Your providers will also teach you how to dispose of needles and syringes.    You  will learn how much insulin you need  and when to give it. You will be taught when not to give insulin. You will also be taught what to do if your blood sugar level drops too low. This may happen if you take insulin and do not eat the right amount of carbohydrates.    More ways to manage type 2 diabetes:   Wear medical alert identification.  Wear medical alert jewelry or carry a card that says you have diabetes. Ask your provider where to get these items.         Do not smoke.  Nicotine and other chemicals in cigarettes and cigars can cause lung and blood vessel damage. It also makes it more difficult to manage your diabetes. Ask your provider for information if you currently smoke and need help to quit. Do not use e-cigarettes or smokeless tobacco in place of cigarettes or to help you quit. They still contain nicotine.    Check your feet each day for cuts, scratches, calluses, or other wounds.  Look for redness and swelling, and feel for warmth. Wear shoes that fit well. Check your shoes for rocks or other objects that can hurt your feet. Do not walk barefoot or wear shoes without socks. Wear cotton socks to help keep your feet dry.         Ask about vaccines you may need.  You have a higher risk for serious illness if you get the flu, pneumonia, COVID-19, or hepatitis. Ask your provider if you should get vaccines to prevent these or other diseases, and when to get the vaccines.    Talk to your provider if you become stressed about diabetes care.  Sometimes being able to fit diabetes care into your life can cause increased stress. The stress can cause you not to take care of yourself properly. Your provider can help by offering tips about self-care. A mental health provider can listen and offer help with self-care issues. Other types of counseling can help you make nutrition or physical activity changes.    Have your A1c checked as directed.  Your provider may check your A1c every 3 months, or 2 times each year if  your diabetes is controlled. An A1c test shows the average amount of sugar in your blood over the past 2 to 3 months. Your provider will tell you what your A1c level should be.    Have screening tests as directed.  Your provider may recommend screening for complications of diabetes and other conditions that may develop. Some screenings may begin right away and some may happen within the first 5 years of diagnosis:    Examples of diabetes complications  include kidney problems, high cholesterol, high blood pressure, blood vessel problems, eye problems, and sleep apnea.    You may be screened for a low vitamin B level  if you take oral diabetes medicine for a long time.    You may be screened for polycystic ovarian syndrome (PCOS)  if you are of childbearing age.    Follow up with your doctor or diabetes care team providers as directed:  You may need to have blood tests done before your follow-up visit. The test results will show if changes need to be made in your treatment or self-care. Talk to your provider if you cannot afford your medicine. Write down your questions so you remember to ask them during your visits.  © Copyright [employer] 2023 Information is for End User's use only and may not be sold, redistributed or otherwise used for commercial purposes.  The above information is an  only. It is not intended as medical advice for individual conditions or treatments. Talk to your doctor, nurse or pharmacist before following any medical regimen to see if it is safe and effective for you.          Chief Complaint     Chief Complaint   Patient presents with    Diabetes     6 month Diabetic check  Hemo A1C         History of Present Illness       Patient comes in for 6-month medical follow-up. He has no concerns at present.  1) diabetes-good control with current meds. Ha1c is 6.9 today  2) hyperlipidemia-last levels checked are good.   3) hypertension -Bp is stable          Review of Systems   Review of  Systems   Constitutional:  Negative for activity change and fever.   Eyes:  Negative for visual disturbance.   Respiratory:  Negative for chest tightness, shortness of breath and wheezing.    Cardiovascular:  Negative for chest pain, palpitations and leg swelling.   Gastrointestinal:  Negative for abdominal pain.   Genitourinary:  Negative for difficulty urinating.   Skin:  Negative for color change and pallor.   Neurological:  Negative for dizziness, weakness and headaches.         Current Medications       Current Outpatient Medications:     atorvastatin (LIPITOR) 10 mg tablet, TAKE 1 TABLET (10 MG TOTAL) BY MOUTH DAILY AS DIRECTED, Disp: 90 tablet, Rfl: 1    hydrochlorothiazide (HYDRODIURIL) 25 mg tablet, TAKE 1 TABLET BY MOUTH EVERY DAY, Disp: 90 tablet, Rfl: 1    lisinopril (ZESTRIL) 40 mg tablet, TAKE 1 TABLET BY MOUTH EVERY DAY, Disp: 90 tablet, Rfl: 1    metFORMIN (GLUCOPHAGE) 850 mg tablet, TAKE 1 TABLET BY MOUTH TWICE A DAY, Disp: 180 tablet, Rfl: 1    Misc Natural Products (OSTEO BI-FLEX ADV TRIPLE ST) TABS, Take 1 tablet by mouth daily , Disp: , Rfl:     omeprazole (PriLOSEC) 20 mg delayed release capsule, TAKE 1 CAPSULE BY MOUTH EVERY DAY, Disp: 90 capsule, Rfl: 1    Current Allergies     Allergies as of 01/29/2024 - Reviewed 01/29/2024   Allergen Reaction Noted    Judy Other (See Comments) 03/23/2022            The following portions of the patient's history were reviewed and updated as appropriate: allergies, current medications, past family history, past medical history, past social history, past surgical history and problem list.     Past Medical History:   Diagnosis Date    Diabetes mellitus (HCC)     Hypertension     Low back pain        Past Surgical History:   Procedure Laterality Date    NO PAST SURGERIES         Family History   Problem Relation Age of Onset    Lung cancer Mother     Diabetes Father     Hyperlipidemia Father     Bipolar disorder Sister         1 disorder    Other  Sister         hx of suicide    Substance Abuse Neg Hx          Medications have been verified.        Objective   /83 (BP Location: Left arm, Patient Position: Sitting, Cuff Size: Standard)   Pulse 76   Temp 97.6 °F (36.4 °C) (Tympanic)   Wt 116 kg (255 lb)   SpO2 99%   BMI 38.77 kg/m²        Physical Exam     Physical Exam  Vitals and nursing note reviewed.   Constitutional:       General: He is not in acute distress.     Appearance: Normal appearance. He is not ill-appearing.   HENT:      Head: Normocephalic.   Eyes:      Extraocular Movements: Extraocular movements intact.   Cardiovascular:      Rate and Rhythm: Normal rate and regular rhythm.      Heart sounds: Normal heart sounds. No murmur heard.     No friction rub. No gallop.   Pulmonary:      Effort: Pulmonary effort is normal. No respiratory distress.      Breath sounds: Normal breath sounds. No wheezing.   Musculoskeletal:         General: Normal range of motion.   Skin:     General: Skin is warm and dry.   Neurological:      Mental Status: He is alert and oriented to person, place, and time.   Psychiatric:         Mood and Affect: Mood normal.         Behavior: Behavior normal.         Thought Content: Thought content normal.         Judgment: Judgment normal.

## 2024-03-26 DIAGNOSIS — K21.00 PEPTIC REFLUX ESOPHAGITIS: ICD-10-CM

## 2024-03-26 DIAGNOSIS — I10 BENIGN ESSENTIAL HYPERTENSION: ICD-10-CM

## 2024-03-26 RX ORDER — LISINOPRIL 40 MG/1
40 TABLET ORAL DAILY
Qty: 90 TABLET | Refills: 1 | Status: SHIPPED | OUTPATIENT
Start: 2024-03-26

## 2024-03-26 RX ORDER — OMEPRAZOLE 20 MG/1
CAPSULE, DELAYED RELEASE ORAL
Qty: 90 CAPSULE | Refills: 1 | Status: SHIPPED | OUTPATIENT
Start: 2024-03-26

## 2024-04-14 DIAGNOSIS — E11.69 HYPERLIPIDEMIA ASSOCIATED WITH TYPE 2 DIABETES MELLITUS  (HCC): ICD-10-CM

## 2024-04-14 DIAGNOSIS — E78.5 HYPERLIPIDEMIA ASSOCIATED WITH TYPE 2 DIABETES MELLITUS  (HCC): ICD-10-CM

## 2024-04-15 DIAGNOSIS — E78.5 HYPERLIPIDEMIA ASSOCIATED WITH TYPE 2 DIABETES MELLITUS  (HCC): ICD-10-CM

## 2024-04-15 DIAGNOSIS — E11.69 HYPERLIPIDEMIA ASSOCIATED WITH TYPE 2 DIABETES MELLITUS  (HCC): ICD-10-CM

## 2024-04-15 RX ORDER — ATORVASTATIN CALCIUM 10 MG/1
10 TABLET, FILM COATED ORAL DAILY
Qty: 90 TABLET | Refills: 0 | Status: SHIPPED | OUTPATIENT
Start: 2024-04-15

## 2024-04-15 RX ORDER — ATORVASTATIN CALCIUM 10 MG/1
10 TABLET, FILM COATED ORAL DAILY
Qty: 90 TABLET | Refills: 1 | Status: SHIPPED | OUTPATIENT
Start: 2024-04-15 | End: 2024-04-15 | Stop reason: SDUPTHER

## 2024-06-15 DIAGNOSIS — E11.9 TYPE 2 DIABETES MELLITUS WITHOUT COMPLICATION, WITHOUT LONG-TERM CURRENT USE OF INSULIN (HCC): ICD-10-CM

## 2024-06-15 DIAGNOSIS — I10 BENIGN ESSENTIAL HYPERTENSION: ICD-10-CM

## 2024-06-16 RX ORDER — HYDROCHLOROTHIAZIDE 25 MG/1
TABLET ORAL
Qty: 90 TABLET | Refills: 1 | Status: SHIPPED | OUTPATIENT
Start: 2024-06-16

## 2024-07-09 LAB
LEFT EYE DIABETIC RETINOPATHY: NORMAL
RIGHT EYE DIABETIC RETINOPATHY: NORMAL

## 2024-07-13 DIAGNOSIS — I10 BENIGN ESSENTIAL HYPERTENSION: ICD-10-CM

## 2024-07-13 DIAGNOSIS — K21.00 PEPTIC REFLUX ESOPHAGITIS: ICD-10-CM

## 2024-07-13 RX ORDER — OMEPRAZOLE 20 MG/1
CAPSULE, DELAYED RELEASE ORAL
Qty: 90 CAPSULE | Refills: 1 | Status: SHIPPED | OUTPATIENT
Start: 2024-07-13

## 2024-07-13 RX ORDER — LISINOPRIL 40 MG/1
40 TABLET ORAL DAILY
Qty: 90 TABLET | Refills: 1 | Status: SHIPPED | OUTPATIENT
Start: 2024-07-13

## 2024-07-23 DIAGNOSIS — E11.9 TYPE 2 DIABETES MELLITUS WITHOUT COMPLICATION, WITHOUT LONG-TERM CURRENT USE OF INSULIN (HCC): Primary | ICD-10-CM

## 2024-07-23 LAB
ALBUMIN SERPL-MCNC: 4.6 G/DL (ref 3.8–4.9)
ALBUMIN/CREAT UR: 8 MG/G CREAT (ref 0–29)
ALP SERPL-CCNC: 41 IU/L (ref 44–121)
ALT SERPL-CCNC: 73 IU/L (ref 0–44)
AST SERPL-CCNC: 41 IU/L (ref 0–40)
BILIRUB SERPL-MCNC: 0.8 MG/DL (ref 0–1.2)
BUN SERPL-MCNC: 16 MG/DL (ref 6–24)
BUN/CREAT SERPL: 14 (ref 9–20)
CALCIUM SERPL-MCNC: 9.9 MG/DL (ref 8.7–10.2)
CHLORIDE SERPL-SCNC: 100 MMOL/L (ref 96–106)
CHOLEST SERPL-MCNC: 150 MG/DL (ref 100–199)
CHOLEST/HDLC SERPL: 4.8 RATIO (ref 0–5)
CO2 SERPL-SCNC: 21 MMOL/L (ref 20–29)
CREAT SERPL-MCNC: 1.15 MG/DL (ref 0.76–1.27)
CREAT UR-MCNC: 80.9 MG/DL
EGFR: 73 ML/MIN/1.73
EST. AVERAGE GLUCOSE BLD GHB EST-MCNC: 200 MG/DL
GLOBULIN SER-MCNC: 2.5 G/DL (ref 1.5–4.5)
GLUCOSE SERPL-MCNC: 135 MG/DL (ref 70–99)
HBA1C MFR BLD: 8.6 % (ref 4.8–5.6)
HDLC SERPL-MCNC: 31 MG/DL
LDLC SERPL CALC-MCNC: 94 MG/DL (ref 0–99)
MICROALBUMIN UR-MCNC: 6.3 UG/ML
POTASSIUM SERPL-SCNC: 4.8 MMOL/L (ref 3.5–5.2)
PROT SERPL-MCNC: 7.1 G/DL (ref 6–8.5)
SL AMB VLDL CHOLESTEROL CALC: 25 MG/DL (ref 5–40)
SODIUM SERPL-SCNC: 135 MMOL/L (ref 134–144)
TRIGL SERPL-MCNC: 137 MG/DL (ref 0–149)

## 2024-07-30 ENCOUNTER — TELEPHONE (OUTPATIENT)
Age: 59
End: 2024-07-30

## 2024-07-30 ENCOUNTER — TELEPHONE (OUTPATIENT)
Dept: FAMILY MEDICINE CLINIC | Facility: CLINIC | Age: 59
End: 2024-07-30

## 2024-07-30 DIAGNOSIS — E11.9 TYPE 2 DIABETES MELLITUS WITHOUT COMPLICATION, WITHOUT LONG-TERM CURRENT USE OF INSULIN (HCC): ICD-10-CM

## 2024-07-30 DIAGNOSIS — E78.2 MIXED HYPERLIPIDEMIA: Primary | ICD-10-CM

## 2024-07-30 DIAGNOSIS — Z12.5 SCREENING PSA (PROSTATE SPECIFIC ANTIGEN): ICD-10-CM

## 2024-07-30 NOTE — TELEPHONE ENCOUNTER
Relayed results to patient as per provider message. Patient expressed understanding and did not have any further questions.  Pt scheduled Physical appt for 11/4/2024.

## 2024-07-30 NOTE — TELEPHONE ENCOUNTER
----- Message from PARKER Pimentel sent at 7/26/2024  8:15 AM EDT -----  Your A1c is 8.6 this has increased from previous. Your liver enzymes are also elevated and HDL cholesterol is low. Recommend lifestyle modification-healthy diet-decrease sugars, carbs and fats, increase physical activity as tolerated. Liver enzymes c  an be elevated due to use of Nsaids (Ibuprofen), and alcohol. I also recommend increasing Metformin to 1000 mg twice a day. Will repeat labs  in 3 months. Schedule appointment in office for 3 months for annual physical and diabetes check. Increased d  ose of Metformin sent to your pharmacy. Contact office with questions/concerns.

## 2024-10-05 DIAGNOSIS — E11.69 HYPERLIPIDEMIA ASSOCIATED WITH TYPE 2 DIABETES MELLITUS  (HCC): ICD-10-CM

## 2024-10-05 DIAGNOSIS — E78.5 HYPERLIPIDEMIA ASSOCIATED WITH TYPE 2 DIABETES MELLITUS  (HCC): ICD-10-CM

## 2024-10-05 RX ORDER — ATORVASTATIN CALCIUM 10 MG/1
10 TABLET, FILM COATED ORAL DAILY
Qty: 90 TABLET | Refills: 0 | Status: SHIPPED | OUTPATIENT
Start: 2024-10-05

## 2024-10-27 LAB
CHOLEST SERPL-MCNC: 146 MG/DL (ref 100–199)
EST. AVERAGE GLUCOSE BLD GHB EST-MCNC: 174 MG/DL
HBA1C MFR BLD: 7.7 % (ref 4.8–5.6)
HDLC SERPL-MCNC: 31 MG/DL
LDLC SERPL CALC-MCNC: 94 MG/DL (ref 0–99)
LDLC/HDLC SERPL: 3 RATIO (ref 0–3.6)
PSA SERPL-MCNC: 0.2 NG/ML (ref 0–4)
SL AMB REFLEX CRITERIA: NORMAL
SL AMB VLDL CHOLESTEROL CALC: 21 MG/DL (ref 5–40)
TRIGL SERPL-MCNC: 117 MG/DL (ref 0–149)

## 2024-11-04 ENCOUNTER — OFFICE VISIT (OUTPATIENT)
Dept: FAMILY MEDICINE CLINIC | Facility: CLINIC | Age: 59
End: 2024-11-04
Payer: COMMERCIAL

## 2024-11-04 VITALS
BODY MASS INDEX: 39.23 KG/M2 | OXYGEN SATURATION: 96 % | WEIGHT: 258 LBS | SYSTOLIC BLOOD PRESSURE: 130 MMHG | HEART RATE: 80 BPM | DIASTOLIC BLOOD PRESSURE: 80 MMHG

## 2024-11-04 DIAGNOSIS — K21.00 PEPTIC REFLUX ESOPHAGITIS: ICD-10-CM

## 2024-11-04 DIAGNOSIS — E78.5 HYPERLIPIDEMIA ASSOCIATED WITH TYPE 2 DIABETES MELLITUS  (HCC): ICD-10-CM

## 2024-11-04 DIAGNOSIS — E11.9 TYPE 2 DIABETES MELLITUS WITHOUT COMPLICATION, WITHOUT LONG-TERM CURRENT USE OF INSULIN (HCC): ICD-10-CM

## 2024-11-04 DIAGNOSIS — Z00.00 ANNUAL PHYSICAL EXAM: Primary | ICD-10-CM

## 2024-11-04 DIAGNOSIS — E11.69 HYPERLIPIDEMIA ASSOCIATED WITH TYPE 2 DIABETES MELLITUS  (HCC): ICD-10-CM

## 2024-11-04 DIAGNOSIS — I10 BENIGN ESSENTIAL HYPERTENSION: ICD-10-CM

## 2024-11-04 DIAGNOSIS — Z23 ENCOUNTER FOR IMMUNIZATION: ICD-10-CM

## 2024-11-04 PROCEDURE — 90471 IMMUNIZATION ADMIN: CPT

## 2024-11-04 PROCEDURE — 90715 TDAP VACCINE 7 YRS/> IM: CPT

## 2024-11-04 PROCEDURE — 99214 OFFICE O/P EST MOD 30 MIN: CPT | Performed by: NURSE PRACTITIONER

## 2024-11-04 PROCEDURE — 99396 PREV VISIT EST AGE 40-64: CPT | Performed by: NURSE PRACTITIONER

## 2024-11-04 RX ORDER — LISINOPRIL 40 MG/1
40 TABLET ORAL DAILY
Qty: 90 TABLET | Refills: 1 | Status: SHIPPED | OUTPATIENT
Start: 2024-11-04

## 2024-11-04 RX ORDER — HYDROCHLOROTHIAZIDE 25 MG/1
25 TABLET ORAL DAILY
Qty: 90 TABLET | Refills: 1 | Status: SHIPPED | OUTPATIENT
Start: 2024-11-04

## 2024-11-04 RX ORDER — ATORVASTATIN CALCIUM 10 MG/1
10 TABLET, FILM COATED ORAL DAILY
Qty: 90 TABLET | Refills: 0 | Status: SHIPPED | OUTPATIENT
Start: 2024-11-04

## 2024-11-04 NOTE — ASSESSMENT & PLAN NOTE
Lab Results   Component Value Date    HGBA1C 7.7 (H) 10/26/2024     A1c is 7.7 which is improved from previous. Patient would like to continue working on diet/exercise to try and lower A1c. He will continue metformin. Lifestyle modifications discussed. Return in 3 months for recheck. Will repeat A1c in office at next visit.    Orders:    metFORMIN (GLUCOPHAGE) 1000 MG tablet; Take 1 tablet (1,000 mg total) by mouth 2 (two) times a day with meals

## 2024-11-04 NOTE — ASSESSMENT & PLAN NOTE
Lab Results   Component Value Date    HGBA1C 7.7 (H) 10/26/2024      Lipids controlled. Continue Atorvastatin.    Orders:    atorvastatin (LIPITOR) 10 mg tablet; Take 1 tablet (10 mg total) by mouth daily

## 2024-11-04 NOTE — PATIENT INSTRUCTIONS
"Patient Education     Routine physical for adults   The Basics   Written by the doctors and editors at [facility]   What is a physical? -- A physical is a routine visit, or \"check-up,\" with your doctor. You might also hear it called a \"wellness visit\" or \"preventive visit.\"  During each visit, the doctor will:   Ask about your physical and mental health   Ask about your habits, behaviors, and lifestyle   Do an exam   Give you vaccines if needed   Talk to you about any medicines you take   Give advice about your health   Answer your questions  Getting regular check-ups is an important part of taking care of your health. It can help your doctor find and treat any problems you have. But it's also important for preventing health problems.  A routine physical is different from a \"sick visit.\" A sick visit is when you see a doctor because of a health concern or problem. Since physicals are scheduled ahead of time, you can think about what you want to ask the doctor.  How often should I get a physical? -- It depends on your age and health. In general, for people age 21 years and older:   If you are younger than 50 years, you might be able to get a physical every 3 years.   If you are 50 years or older, your doctor might recommend a physical every year.  If you have an ongoing health condition, like diabetes or high blood pressure, your doctor will probably want to see you more often.  What happens during a physical? -- In general, each visit will include:   Physical exam - The doctor or nurse will check your height, weight, heart rate, and blood pressure. They will also look at your eyes and ears. They will ask about how you are feeling and whether you have any symptoms that bother you.   Medicines - It's a good idea to bring a list of all the medicines you take to each doctor visit. Your doctor will talk to you about your medicines and answer any questions. Tell them if you are having any side effects that bother you. You " "should also tell them if you are having trouble paying for any of your medicines.   Habits and behaviors - This includes:   Your diet   Your exercise habits   Whether you smoke, drink alcohol, or use drugs   Whether you are sexually active   Whether you feel safe at home  Your doctor will talk to you about things you can do to improve your health and lower your risk of health problems. They will also offer help and support. For example, if you want to quit smoking, they can give you advice and might prescribe medicines. If you want to improve your diet or get more physical activity, they can help you with this, too.   Lab tests, if needed - The tests you get will depend on your age and situation. For example, your doctor might want to check your:   Cholesterol   Blood sugar   Iron level   Vaccines - The recommended vaccines will depend on your age, health, and what vaccines you already had. Vaccines are very important because they can prevent certain serious or deadly infections.   Discussion of screening - \"Screening\" means checking for diseases or other health problems before they cause symptoms. Your doctor can recommend screening based on your age, risk, and preferences. This might include tests to check for:   Cancer, such as breast, prostate, cervical, ovarian, colorectal, prostate, lung, or skin cancer   Sexually transmitted infections, such as chlamydia and gonorrhea   Mental health conditions like depression and anxiety  Your doctor will talk to you about the different types of screening tests. They can help you decide which screenings to have. They can also explain what the results might mean.   Answering questions - The physical is a good time to ask the doctor or nurse questions about your health. If needed, they can refer you to other doctors or specialists, too.  Adults older than 65 years often need other care, too. As you get older, your doctor will talk to you about:   How to prevent falling at " home   Hearing or vision tests   Memory testing   How to take your medicines safely   Making sure that you have the help and support you need at home  All topics are updated as new evidence becomes available and our peer review process is complete.  This topic retrieved from [employer] on: May 02, 2024.  Topic 097031 Version 1.0  Release: 32.4.3 - C32.122  © 2024 UpToDate, Inc. and/or its affiliates. All rights reserved.  Consumer Information Use and Disclaimer   Disclaimer: This generalized information is a limited summary of diagnosis, treatment, and/or medication information. It is not meant to be comprehensive and should be used as a tool to help the user understand and/or assess potential diagnostic and treatment options. It does NOT include all information about conditions, treatments, medications, side effects, or risks that may apply to a specific patient. It is not intended to be medical advice or a substitute for the medical advice, diagnosis, or treatment of a health care provider based on the health care provider's examination and assessment of a patient's specific and unique circumstances. Patients must speak with a health care provider for complete information about their health, medical questions, and treatment options, including any risks or benefits regarding use of medications. This information does not endorse any treatments or medications as safe, effective, or approved for treating a specific patient. UpToDate, Inc. and its affiliates disclaim any warranty or liability relating to this information or the use thereof.The use of this information is governed by the Terms of Use, available at https://www.[employer].com/en/know/clinical-effectiveness-terms. 2024© UpToDate, Inc. and its affiliates and/or licensors. All rights reserved.  Copyright   © 2024 UpToDate, Inc. and/or its affiliates. All rights reserved.

## 2024-11-04 NOTE — ASSESSMENT & PLAN NOTE
Orders:    omeprazole (PriLOSEC) 20 mg delayed release capsule; Take 1 capsule (20 mg total) by mouth daily

## 2024-11-04 NOTE — PROGRESS NOTES
Adult Annual Physical  Name: Dakota Lake      : 1965      MRN: [id number]  Encounter Provider: PARKER Vidal  Encounter Date: 2024   Encounter department: [facility]    Assessment & Plan  Annual physical exam         Type 2 diabetes mellitus without complication, without long-term current use of insulin (HCC)    Lab Results   Component Value Date    HGBA1C 7.7 (H) 10/26/2024     A1c is 7.7 which is improved from previous. Patient would like to continue working on diet/exercise to try and lower A1c. He will continue metformin. Lifestyle modifications discussed. Return in 3 months for recheck. Will repeat A1c in office at next visit.    Orders:    metFORMIN (GLUCOPHAGE) 1000 MG tablet; Take 1 tablet (1,000 mg total) by mouth 2 (two) times a day with meals    Benign essential hypertension  BP is stable. Continue current medications.    Orders:    lisinopril (ZESTRIL) 40 mg tablet; Take 1 tablet (40 mg total) by mouth daily    hydroCHLOROthiazide 25 mg tablet; Take 1 tablet (25 mg total) by mouth daily    Peptic reflux esophagitis    Orders:    omeprazole (PriLOSEC) 20 mg delayed release capsule; Take 1 capsule (20 mg total) by mouth daily    Hyperlipidemia associated with type 2 diabetes mellitus  (HCC)    Lab Results   Component Value Date    HGBA1C 7.7 (H) 10/26/2024      Lipids controlled. Continue Atorvastatin.    Orders:    atorvastatin (LIPITOR) 10 mg tablet; Take 1 tablet (10 mg total) by mouth daily    Encounter for immunization    Orders:    TDAP VACCINE GREATER THAN OR EQUAL TO 8YO IM    Immunizations and preventive care screenings were discussed with patient today. Appropriate education was printed on patient's after visit summary.        Counseling:  Alcohol/drug use: discussed moderation in alcohol intake, the recommendations for healthy alcohol use, and avoidance of illicit drug use.  Dental Health: discussed importance of regular tooth brushing,  flossing, and dental visits.  Injury prevention: discussed safety/seat belts, safety helmets, smoke detectors, carbon monoxide detectors, and smoking near bedding or upholstery.  Sexual health: discussed sexually transmitted diseases, partner selection, use of condoms, avoidance of unintended pregnancy, and contraceptive alternatives.  Exercise: the importance of regular exercise/physical activity was discussed. Recommend exercise 3-5 times per week for at least 30 minutes.          History of Present Illness     Adult Annual Physical:  Patient presents for annual physical. Here for annual physical and medical management. No concerns at present. Would like a tetanus last one in 2014..     Diet and Physical Activity:  - Diet/Nutrition: well balanced diet.  - Exercise:. active at work    Depression Screening:  - PHQ-2 Score: 0    General Health:  - Sleep: sleeps well.  - Hearing: normal hearing bilateral ears.  - Vision: goes for regular eye exams.  - Dental: regular dental visits.     Health:    - Urinary symptoms: none.     Advanced Care Planning:  - Has an advanced directive?: no    - Has a durable medical POA?: no    - ACP document given to patient?: yes      Review of Systems   Constitutional:  Negative for activity change, appetite change, chills, diaphoresis, fatigue, fever and unexpected weight change.   HENT:  Negative for congestion, dental problem, drooling, ear discharge, ear pain, facial swelling, hearing loss, mouth sores, nosebleeds, postnasal drip, rhinorrhea, sinus pressure, sinus pain, sneezing, sore throat, tinnitus, trouble swallowing and voice change.    Eyes:  Negative for photophobia, pain, discharge, redness, itching and visual disturbance.   Respiratory:  Negative for apnea, cough, choking, chest tightness, shortness of breath, wheezing and stridor.    Cardiovascular:  Negative for chest pain, palpitations and leg swelling.   Gastrointestinal:  Negative for abdominal distention, abdominal  pain, anal bleeding, blood in stool, constipation, diarrhea, nausea, rectal pain and vomiting.   Endocrine: Negative for cold intolerance, heat intolerance, polydipsia, polyphagia and polyuria.   Genitourinary:  Negative for decreased urine volume, difficulty urinating, dysuria, enuresis, flank pain, frequency, genital sores, hematuria, penile discharge, penile pain, penile swelling, scrotal swelling, testicular pain and urgency.   Musculoskeletal:  Negative for arthralgias, back pain, gait problem, joint swelling, myalgias, neck pain and neck stiffness.   Skin:  Negative for color change, pallor, rash and wound.   Neurological:  Negative for dizziness, tremors, seizures, syncope, facial asymmetry, speech difficulty, weakness, light-headedness, numbness and headaches.   Hematological:  Negative for adenopathy. Does not bruise/bleed easily.   Psychiatric/Behavioral:  Negative for agitation, behavioral problems, confusion, decreased concentration, dysphoric mood, hallucinations, self-injury, sleep disturbance and suicidal ideas. The patient is not nervous/anxious and is not hyperactive.      Medical History Reviewed by provider this encounter:  Tobacco  Allergies  Meds  Problems  Med Hx  Surg Hx  Fam Hx       Current Outpatient Medications on File Prior to Visit   Medication Sig Dispense Refill    [facility] (OSTEO BI-FLEX ADV TRIPLE ST) TABS Take 1 tablet by mouth daily       [DISCONTINUED] atorvastatin (LIPITOR) 10 mg tablet TAKE 1 TABLET BY MOUTH DAILY AS DIRECTED 90 tablet 0    [DISCONTINUED] hydroCHLOROthiazide 25 mg tablet TAKE 1 TABLET BY MOUTH EVERY DAY 90 tablet 1    [DISCONTINUED] lisinopril (ZESTRIL) 40 mg tablet TAKE 1 TABLET BY MOUTH EVERY DAY 90 tablet 1    [DISCONTINUED] metFORMIN (GLUCOPHAGE) 1000 MG tablet Take 1 tablet (1,000 mg total) by mouth 2 (two) times a day with meals 180 tablet 1    [DISCONTINUED] omeprazole (PriLOSEC) 20 mg delayed release capsule TAKE 1 CAPSULE BY MOUTH  EVERY DAY 90 capsule 1     No current facility-administered medications on file prior to visit.        Objective     /80 (BP Location: Left arm, Patient Position: Sitting, Cuff Size: Standard)   Pulse 80   Wt 117 kg (258 lb)   SpO2 96%   BMI 39.23 kg/m²     Physical Exam  Vitals and nursing note reviewed.   Constitutional:       General: He is not in acute distress.     Appearance: Normal appearance. He is well-developed. He is not ill-appearing, toxic-appearing or diaphoretic.   HENT:      Head: Normocephalic and atraumatic.      Right Ear: Tympanic membrane, ear canal and external ear normal. There is no impacted cerumen.      Left Ear: Tympanic membrane, ear canal and external ear normal. There is no impacted cerumen.      Nose: Nose normal. No congestion or rhinorrhea.      Right Sinus: No maxillary sinus tenderness or frontal sinus tenderness.      Left Sinus: No maxillary sinus tenderness or frontal sinus tenderness.      Mouth/Throat:      Mouth: Mucous membranes are moist.      Pharynx: Uvula midline. No oropharyngeal exudate or posterior oropharyngeal erythema.   Eyes:      General:         Right eye: No discharge.         Left eye: No discharge.      Conjunctiva/sclera: Conjunctivae normal.      Pupils: Pupils are equal, round, and reactive to light.   Neck:      Thyroid: No thyromegaly.      Vascular: No carotid bruit.      Trachea: No tracheal deviation.   Cardiovascular:      Rate and Rhythm: Normal rate and regular rhythm.      Pulses: no weak pulses.           Dorsalis pedis pulses are 1+ on the right side and 1+ on the left side.      Heart sounds: Normal heart sounds. No murmur heard.     No friction rub. No gallop.   Pulmonary:      Effort: Pulmonary effort is normal. No respiratory distress.      Breath sounds: Normal breath sounds. No stridor. No wheezing, rhonchi or rales.   Chest:      Chest wall: No tenderness.   Abdominal:      General: Bowel sounds are normal. There is no  distension.      Palpations: Abdomen is soft. There is no mass.      Tenderness: There is no abdominal tenderness. There is no left CVA tenderness, guarding or rebound.      Hernia: No hernia is present.   Musculoskeletal:         General: No swelling, tenderness, deformity or signs of injury. Normal range of motion.      Cervical back: Normal range of motion and neck supple. No rigidity or tenderness.      Right lower leg: No edema.      Left lower leg: No edema.   Feet:      Right foot:      Skin integrity: No ulcer, skin breakdown, erythema, warmth, callus or dry skin.      Left foot:      Skin integrity: No ulcer, skin breakdown, erythema, warmth, callus or dry skin.   Lymphadenopathy:      Cervical: No cervical adenopathy.   Skin:     General: Skin is warm and dry.      Capillary Refill: Capillary refill takes less than 2 seconds.      Coloration: Skin is not jaundiced or pale.      Findings: No bruising, erythema, lesion or rash.   Neurological:      General: No focal deficit present.      Mental Status: He is alert and oriented to person, place, and time.      Cranial Nerves: No cranial nerve deficit.      Sensory: No sensory deficit.      Motor: No weakness.      Coordination: Coordination normal.      Gait: Gait normal.      Deep Tendon Reflexes: Reflexes normal.   Psychiatric:         Mood and Affect: Mood normal.         Speech: Speech normal.         Behavior: Behavior normal.         Thought Content: Thought content normal.         Judgment: Judgment normal.       Patient's shoes and socks removed.    Right Foot/Ankle   Right Foot Inspection  Skin Exam: skin normal and skin intact. No dry skin, no warmth, no callus, no erythema, no maceration, no abnormal color, no pre-ulcer, no ulcer and no callus.     Toe Exam: ROM and strength within normal limits.     Sensory   Vibration: intact  Proprioception: intact  Monofilament testing: intact    Vascular  Capillary refills: < 3 seconds  The right DP pulse is  1+.     Left Foot/Ankle  Left Foot Inspection  Skin Exam: skin normal and skin intact. No dry skin, no warmth, no erythema, no maceration, normal color, no pre-ulcer, no ulcer and no callus.     Toe Exam: ROM and strength within normal limits.     Sensory   Vibration: intact  Proprioception: intact  Monofilament testing: intact    Vascular  Capillary refills: < 3 seconds  The left DP pulse is 1+.     Assign Risk Category  No deformity present  No loss of protective sensation  No weak pulses  Risk: 0

## 2024-11-04 NOTE — ASSESSMENT & PLAN NOTE
BP is stable. Continue current medications.    Orders:    lisinopril (ZESTRIL) 40 mg tablet; Take 1 tablet (40 mg total) by mouth daily    hydroCHLOROthiazide 25 mg tablet; Take 1 tablet (25 mg total) by mouth daily

## 2025-01-08 DIAGNOSIS — E11.9 TYPE 2 DIABETES MELLITUS WITHOUT COMPLICATION, WITHOUT LONG-TERM CURRENT USE OF INSULIN (HCC): ICD-10-CM

## 2025-02-03 ENCOUNTER — OFFICE VISIT (OUTPATIENT)
Dept: FAMILY MEDICINE CLINIC | Facility: CLINIC | Age: 60
End: 2025-02-03
Payer: COMMERCIAL

## 2025-02-03 VITALS — WEIGHT: 267 LBS | BODY MASS INDEX: 40.47 KG/M2 | HEART RATE: 86 BPM | HEIGHT: 68 IN | OXYGEN SATURATION: 96 %

## 2025-02-03 DIAGNOSIS — E78.5 HYPERLIPIDEMIA ASSOCIATED WITH TYPE 2 DIABETES MELLITUS  (HCC): ICD-10-CM

## 2025-02-03 DIAGNOSIS — E11.69 HYPERLIPIDEMIA ASSOCIATED WITH TYPE 2 DIABETES MELLITUS  (HCC): ICD-10-CM

## 2025-02-03 DIAGNOSIS — E66.813 CLASS 3 SEVERE OBESITY DUE TO EXCESS CALORIES WITH SERIOUS COMORBIDITY AND BODY MASS INDEX (BMI) OF 40.0 TO 44.9 IN ADULT (HCC): ICD-10-CM

## 2025-02-03 DIAGNOSIS — E11.9 TYPE 2 DIABETES MELLITUS WITHOUT COMPLICATION, WITHOUT LONG-TERM CURRENT USE OF INSULIN (HCC): Primary | ICD-10-CM

## 2025-02-03 DIAGNOSIS — E66.01 CLASS 3 SEVERE OBESITY DUE TO EXCESS CALORIES WITH SERIOUS COMORBIDITY AND BODY MASS INDEX (BMI) OF 40.0 TO 44.9 IN ADULT (HCC): ICD-10-CM

## 2025-02-03 LAB — SL AMB POCT HEMOGLOBIN AIC: 10.3 (ref ?–6.5)

## 2025-02-03 PROCEDURE — 99214 OFFICE O/P EST MOD 30 MIN: CPT | Performed by: NURSE PRACTITIONER

## 2025-02-03 PROCEDURE — 83036 HEMOGLOBIN GLYCOSYLATED A1C: CPT | Performed by: NURSE PRACTITIONER

## 2025-02-03 NOTE — PROGRESS NOTES
Name: Dakota Lake      : 1965      MRN: [id number]  Encounter Provider: PARKER Vidal  Encounter Date: 2/3/2025   Encounter department: [facility]  :  Assessment & Plan  Type 2 diabetes mellitus without complication, without long-term current use of insulin ([facility])    Lab Results   Component Value Date    HGBA1C 10.3 (A) 2025     Diabetes is not controlled. Patient states he was not working the last couple months and his diet has been poor and he was not active. He states he is starting back to work. Lifestyle modifications reviewed. Instructed to intensify diet and physical activity. He will continue Metformin 1000 mg bid. Will start Jardiance. Referral placed to diabetic educator. Return in 3 months for recheck.    Orders:    POCT hemoglobin A1c    Ambulatory referral to Diabetic Education - use to refer for diabetes group classes, individual diabetes education, medical nutrition therapy, device training; Future    Empagliflozin (Jardiance) 10 MG TABS tablet; Take 1 tablet (10 mg total) by mouth in the morning    Class 3 severe obesity due to excess calories with serious comorbidity and body mass index (BMI) of 40.0 to 44.9 in adult ([facility])  Discussed lifestyle modifications-healthy diet and to increase activity.         Hyperlipidemia associated with type 2 diabetes mellitus  ([facility])    Lab Results   Component Value Date    HGBA1C 10.3 (A) 2025     Lipids have been stable. Continue atorvastatin. Lifestyle modifications reviewed.                History of Present Illness   Patient is here for diabetes follow up.      Review of Systems   Constitutional:  Negative for activity change and fever.   HENT: Negative.     Eyes:  Negative for visual disturbance.   Respiratory:  Negative for chest tightness and shortness of breath.    Cardiovascular:  Negative for chest pain.   Gastrointestinal:  Negative for abdominal pain.   Endocrine: Negative.   "  Genitourinary:  Negative for difficulty urinating.   Musculoskeletal: Negative.    Skin: Negative.    Neurological:  Negative for dizziness and weakness.   Psychiatric/Behavioral:  Negative for dysphoric mood.        Objective   Pulse 86   Ht 5' 8\" (1.727 m)   Wt 121 kg (267 lb)   SpO2 96%   BMI 40.60 kg/m²      Physical Exam  Vitals and nursing note reviewed.   Constitutional:       General: He is not in acute distress.     Appearance: He is obese. He is not ill-appearing, toxic-appearing or diaphoretic.   HENT:      Head: Normocephalic.   Eyes:      Extraocular Movements: Extraocular movements intact.   Cardiovascular:      Rate and Rhythm: Normal rate and regular rhythm.      Heart sounds: Normal heart sounds.   Pulmonary:      Effort: Pulmonary effort is normal. No respiratory distress.      Breath sounds: Normal breath sounds. No wheezing or rhonchi.   Musculoskeletal:         General: Normal range of motion.   Skin:     General: Skin is warm and dry.      Coloration: Skin is not pale.      Findings: No erythema.   Neurological:      Mental Status: He is alert and oriented to person, place, and time.   Psychiatric:         Mood and Affect: Mood normal.         Behavior: Behavior normal.         "

## 2025-02-03 NOTE — ASSESSMENT & PLAN NOTE
Lab Results   Component Value Date    HGBA1C 10.3 (A) 02/03/2025     Lipids have been stable. Continue atorvastatin. Lifestyle modifications reviewed.

## 2025-02-03 NOTE — ASSESSMENT & PLAN NOTE
Lab Results   Component Value Date    HGBA1C 10.3 (A) 02/03/2025     Diabetes is not controlled. Patient states he was not working the last couple months and his diet has been poor and he was not active. He states he is starting back to work. Lifestyle modifications reviewed. Instructed to intensify diet and physical activity. He will continue Metformin 1000 mg bid. Will start Jardiance. Referral placed to diabetic educator. Return in 3 months for recheck.    Orders:    POCT hemoglobin A1c    Ambulatory referral to Diabetic Education - use to refer for diabetes group classes, individual diabetes education, medical nutrition therapy, device training; Future    Empagliflozin (Jardiance) 10 MG TABS tablet; Take 1 tablet (10 mg total) by mouth in the morning

## 2025-02-04 NOTE — PATIENT INSTRUCTIONS
Intensify diet and physical activity as discussed. Continue Metformin 1000 mg bid with breakfast and dinner. Start Jardiance daily in am. Referral placed to diabetic educator. Return in 3 months for recheck.

## 2025-02-18 ENCOUNTER — CONSULT (OUTPATIENT)
Dept: DIABETES SERVICES | Facility: HOSPITAL | Age: 60
End: 2025-02-18
Payer: COMMERCIAL

## 2025-02-18 VITALS — HEIGHT: 68 IN | WEIGHT: 267 LBS | BODY MASS INDEX: 40.47 KG/M2

## 2025-02-18 DIAGNOSIS — E11.9 TYPE 2 DIABETES MELLITUS WITHOUT COMPLICATION, WITHOUT LONG-TERM CURRENT USE OF INSULIN (HCC): Primary | ICD-10-CM

## 2025-02-18 PROCEDURE — 97802 MEDICAL NUTRITION INDIV IN: CPT | Performed by: DIETITIAN, REGISTERED

## 2025-02-18 NOTE — PROGRESS NOTES
"Medical Nutrition Therapy     Assessment    Visit Type: Initial visit  Chief complaint:  Type 2 Diabetes     HPI: Met with Sushma for medical nutrition therapy.  We previously met for diet education about 3 years ago.  A1c's have varied over the years, but recent A1c 10.3%.  He attributes this to the holidays, and primarily because he was laid off from work and had no activity and was eating a lot while at home.  He recently started working again and is getting back into the group of things.  Also has started on Jardiance and working to get back on track. Food recall shows primary issues: Discussed the benefit of moderate but consistent carbohydrate intake throughout the day for steady blood sugars discussed the benefit of better timing of meals, always having for something for breakfast, and better spacing of meals.  Discussed the need for additional exercise in addition to eating changes.  Together we discussed what foods contain CHO, reading a food label, serving sizes, and set a carb goal of 30-45g CHO/meal to promote weight loss with 15g snacks. Put together sample meals for Dakota's reference and evaluated Dakota's current eating plan. Good understanding, Dakota will call with questions or for more education. Follow up as needed if not improving.          Ht Readings from Last 1 Encounters:   02/18/25 5' 8\" (1.727 m)     Wt Readings from Last 3 Encounters:   02/18/25 121 kg (267 lb)   02/03/25 121 kg (267 lb)   11/04/24 117 kg (258 lb)        Body mass index is 40.6 kg/m².     Lab Results   Component Value Date    HGBA1C 10.3 (A) 02/03/2025    HGBA1C 7.7 (H) 10/26/2024    HGBA1C 8.6 (H) 07/22/2024       Lab Results   Component Value Date    CHOL 157 07/15/2017    CHOL 166 07/02/2016    CHOL 217 (H) 02/11/2016     Lab Results   Component Value Date    HDL 31 (L) 10/26/2024    HDL 31 (L) 07/22/2024    HDL 34 (L) 07/29/2023     Lab Results   Component Value Date    LDLCALC 94 10/26/2024    LDLCALC 94 07/22/2024    " LDLCALC 95 07/29/2023     Lab Results   Component Value Date    TRIG 117 10/26/2024    TRIG 137 07/22/2024    TRIG 98 07/29/2023     Lab Results   Component Value Date    CHOLHDL 4.8 07/22/2024    CHOLHDL 3.9 08/22/2020    CHOLHDL 4.7 02/15/2020       Weight Change: No    Medical Diagnosis/ICD 10 Code:  E11.9    Barriers to Learning: no barriers    Do you follow any special diet presently?: No  Who shops: patient  Who cooks: patient    Food Log: Completed via the method of food recall- lives alone    Breakfast:working again, was out of work. Up around 4am, eats 10am the first time. Sometimes an apple early 6am. Coffee black. Used to get a breakfast sandwich twice a week   Morning Snack:none  Lunch:10am lunch break: sandwich lunch meat apple. Was eating chips but stopped that. Unsweet tea. Soup sometimes   Afternoon Snack: done work 1:30pm. Gets home 3pm has a snack, was chips and snacky things, now PB crackers ritz 10 of them, cheese   Dinner:5pm: meatballs and potatoes not a veggie right now, chicken nuggets chicken patties on the plate with a starch.   Evening Snack:crackers, chips, a little candy when out . Cereal and milk. Likes yogurt, never had cottage cheese.   Beverages: water, unsweet tea, diet soda, no milk, gatorade, diet cranberry, coffee, flavored mixes   Eating out/Take out:not much   Exercise : very active  at work.     Nutrition Diagnosis:  Food and nutrition related knowledge deficit  related to Lack of prior exposure to accurate nutrition related information as evidenced by Verbalizes inaccurate or incomplete information    Intervention: plate method, label reading, behavior modification strategies, carbohydrate counting, meal planning, individualized meal plan, and monitoring portion control     Treatment Goals: Patient understands education and recommendations    Education Material Given  Dakota was provided the Portion Booklet and Planning Healthy Meals     Monitoring and evaluation:     Term code indicator  FH 1.6.3 Carbohydrate Intake Criteria: 30-45g CHO per meal, 0-15g CHO snacks    Patient’s Response to Instruction:  Comprehensiongood  Motivationgood  Expected Compliancegood    Thank you for coming to the [facility] for education today.  Please feel free to call with any questions or concerns.    Christie Thrasher, RD  [address] [id number]

## 2025-04-01 DIAGNOSIS — E11.69 HYPERLIPIDEMIA ASSOCIATED WITH TYPE 2 DIABETES MELLITUS  (HCC): ICD-10-CM

## 2025-04-01 DIAGNOSIS — E78.5 HYPERLIPIDEMIA ASSOCIATED WITH TYPE 2 DIABETES MELLITUS  (HCC): ICD-10-CM

## 2025-04-01 RX ORDER — ATORVASTATIN CALCIUM 10 MG/1
10 TABLET, FILM COATED ORAL DAILY
Qty: 90 TABLET | Refills: 1 | Status: SHIPPED | OUTPATIENT
Start: 2025-04-01

## 2025-05-12 ENCOUNTER — OFFICE VISIT (OUTPATIENT)
Dept: FAMILY MEDICINE CLINIC | Facility: CLINIC | Age: 60
End: 2025-05-12
Payer: COMMERCIAL

## 2025-05-12 VITALS
WEIGHT: 252.6 LBS | TEMPERATURE: 98.5 F | DIASTOLIC BLOOD PRESSURE: 78 MMHG | SYSTOLIC BLOOD PRESSURE: 138 MMHG | HEART RATE: 72 BPM | BODY MASS INDEX: 38.41 KG/M2 | OXYGEN SATURATION: 99 %

## 2025-05-12 DIAGNOSIS — E78.5 HYPERLIPIDEMIA ASSOCIATED WITH TYPE 2 DIABETES MELLITUS  (HCC): ICD-10-CM

## 2025-05-12 DIAGNOSIS — K21.00 PEPTIC REFLUX ESOPHAGITIS: ICD-10-CM

## 2025-05-12 DIAGNOSIS — I10 BENIGN ESSENTIAL HYPERTENSION: ICD-10-CM

## 2025-05-12 DIAGNOSIS — E11.9 TYPE 2 DIABETES MELLITUS WITHOUT COMPLICATION, WITHOUT LONG-TERM CURRENT USE OF INSULIN (HCC): Primary | ICD-10-CM

## 2025-05-12 DIAGNOSIS — E11.69 HYPERLIPIDEMIA ASSOCIATED WITH TYPE 2 DIABETES MELLITUS  (HCC): ICD-10-CM

## 2025-05-12 LAB — SL AMB POCT HEMOGLOBIN AIC: 6.7 (ref ?–6.5)

## 2025-05-12 PROCEDURE — 83036 HEMOGLOBIN GLYCOSYLATED A1C: CPT | Performed by: NURSE PRACTITIONER

## 2025-05-12 PROCEDURE — 99214 OFFICE O/P EST MOD 30 MIN: CPT | Performed by: NURSE PRACTITIONER

## 2025-05-12 RX ORDER — OMEPRAZOLE 20 MG/1
20 CAPSULE, DELAYED RELEASE ORAL DAILY
Qty: 90 CAPSULE | Refills: 1 | Status: SHIPPED | OUTPATIENT
Start: 2025-05-12

## 2025-05-12 RX ORDER — HYDROCHLOROTHIAZIDE 25 MG/1
25 TABLET ORAL DAILY
Qty: 90 TABLET | Refills: 1 | Status: SHIPPED | OUTPATIENT
Start: 2025-05-12

## 2025-05-12 RX ORDER — LISINOPRIL 40 MG/1
40 TABLET ORAL DAILY
Qty: 90 TABLET | Refills: 1 | Status: SHIPPED | OUTPATIENT
Start: 2025-05-12

## 2025-05-12 NOTE — PROGRESS NOTES
Name: Dakota Lake      : 1965      MRN: [id number]  Encounter Provider: PARKER Vidal  Encounter Date: 2025   Encounter department: [facility]  :  Assessment & Plan  Type 2 diabetes mellitus without complication, without long-term current use of insulin (HCC)    Lab Results   Component Value Date    HGBA1C 6.7 (A) 2025     Diabetes controlled. A1c improved from previous. Patient eating well and exercising, lost 15 lbs since last visit. Continue Metformin. Return in 6 months.    Orders:    POCT hemoglobin A1c    metFORMIN (GLUCOPHAGE) 1000 MG tablet; Take 1 tablet (1,000 mg total) by mouth 2 (two) times a day with meals    Comprehensive metabolic panel; Future    Benign essential hypertension  Stable. Continue lisinopril.    Orders:    lisinopril (ZESTRIL) 40 mg tablet; Take 1 tablet (40 mg total) by mouth daily    hydroCHLOROthiazide 25 mg tablet; Take 1 tablet (25 mg total) by mouth daily    Peptic reflux esophagitis    Orders:    omeprazole (PriLOSEC) 20 mg delayed release capsule; Take 1 capsule (20 mg total) by mouth daily    Hyperlipidemia associated with type 2 diabetes mellitus  (HCC)    Lab Results   Component Value Date    HGBA1C 6.7 (A) 2025     Lipids have been stable. Will check prior to next appointment in 6 months. Continue atorvastatin.      Orders:    Lipid panel; Future           History of Present Illness   F/up diabetes.      Review of Systems   Constitutional:  Negative for activity change and fever.   Respiratory:  Negative for chest tightness and shortness of breath.    Cardiovascular:  Negative for chest pain.   Gastrointestinal:  Negative for abdominal pain.   Endocrine: Negative for polydipsia, polyphagia and polyuria.   Genitourinary:  Negative for difficulty urinating.   Skin:  Negative for color change and pallor.   Neurological:  Negative for dizziness and weakness.   Psychiatric/Behavioral:  Negative for dysphoric  mood.        Objective   /78 (BP Location: Left arm, Patient Position: Sitting, Cuff Size: Large)   Pulse 72   Temp 98.5 °F (36.9 °C) (Tympanic)   Wt 115 kg (252 lb 9.6 oz)   SpO2 99%   BMI 38.41 kg/m²      Physical Exam  Vitals and nursing note reviewed.   Constitutional:       General: He is not in acute distress.     Appearance: Normal appearance. He is not ill-appearing.   HENT:      Head: Normocephalic.   Eyes:      Extraocular Movements: Extraocular movements intact.   Cardiovascular:      Rate and Rhythm: Normal rate and regular rhythm.      Heart sounds: Normal heart sounds.   Pulmonary:      Effort: Pulmonary effort is normal. No respiratory distress.      Breath sounds: Normal breath sounds. No wheezing.   Chest:      Chest wall: No tenderness.   Skin:     General: Skin is warm and dry.   Neurological:      Mental Status: He is alert and oriented to person, place, and time.   Psychiatric:         Mood and Affect: Mood normal.         Behavior: Behavior normal.          Suture Removal: 14 days

## 2025-05-12 NOTE — ASSESSMENT & PLAN NOTE
Stable. Continue lisinopril.    Orders:    lisinopril (ZESTRIL) 40 mg tablet; Take 1 tablet (40 mg total) by mouth daily    hydroCHLOROthiazide 25 mg tablet; Take 1 tablet (25 mg total) by mouth daily

## 2025-05-12 NOTE — ASSESSMENT & PLAN NOTE
Lab Results   Component Value Date    HGBA1C 6.7 (A) 05/12/2025     Diabetes controlled. A1c improved from previous. Patient eating well and exercising, lost 15 lbs since last visit. Continue Metformin. Return in 6 months.    Orders:    POCT hemoglobin A1c    metFORMIN (GLUCOPHAGE) 1000 MG tablet; Take 1 tablet (1,000 mg total) by mouth 2 (two) times a day with meals    Comprehensive metabolic panel; Future

## 2025-05-12 NOTE — ASSESSMENT & PLAN NOTE
Lab Results   Component Value Date    HGBA1C 6.7 (A) 05/12/2025     Lipids have been stable. Will check prior to next appointment in 6 months. Continue atorvastatin.      Orders:    Lipid panel; Future

## 2025-07-20 DIAGNOSIS — E11.9 TYPE 2 DIABETES MELLITUS WITHOUT COMPLICATION, WITHOUT LONG-TERM CURRENT USE OF INSULIN (HCC): ICD-10-CM

## 2025-07-21 RX ORDER — EMPAGLIFLOZIN 10 MG/1
10 TABLET, FILM COATED ORAL EVERY MORNING
Qty: 30 TABLET | Refills: 0 | Status: SHIPPED | OUTPATIENT
Start: 2025-07-21

## 2025-08-16 DIAGNOSIS — E11.9 TYPE 2 DIABETES MELLITUS WITHOUT COMPLICATION, WITHOUT LONG-TERM CURRENT USE OF INSULIN (HCC): ICD-10-CM

## 2025-08-18 RX ORDER — EMPAGLIFLOZIN 10 MG/1
10 TABLET, FILM COATED ORAL EVERY MORNING
Qty: 100 TABLET | Refills: 0 | Status: SHIPPED | OUTPATIENT
Start: 2025-08-18